# Patient Record
Sex: MALE | Race: WHITE | Employment: OTHER | URBAN - METROPOLITAN AREA
[De-identification: names, ages, dates, MRNs, and addresses within clinical notes are randomized per-mention and may not be internally consistent; named-entity substitution may affect disease eponyms.]

---

## 2017-01-04 ENCOUNTER — OFFICE VISIT (OUTPATIENT)
Dept: INTERNAL MEDICINE CLINIC | Age: 67
End: 2017-01-04

## 2017-01-04 ENCOUNTER — HOSPITAL ENCOUNTER (OUTPATIENT)
Dept: GENERAL RADIOLOGY | Age: 67
Discharge: HOME | End: 2017-01-04
Attending: FAMILY MEDICINE | Admitting: FAMILY MEDICINE

## 2017-01-04 VITALS
HEIGHT: 69 IN | BODY MASS INDEX: 27.43 KG/M2 | DIASTOLIC BLOOD PRESSURE: 74 MMHG | SYSTOLIC BLOOD PRESSURE: 112 MMHG | HEART RATE: 78 BPM | WEIGHT: 185.2 LBS

## 2017-01-04 DIAGNOSIS — E78.5 HYPERLIPIDEMIA LDL GOAL <70: ICD-10-CM

## 2017-01-04 DIAGNOSIS — R53.83 OTHER FATIGUE: ICD-10-CM

## 2017-01-04 DIAGNOSIS — I48.0 PAROXYSMAL ATRIAL FIBRILLATION (HCC): ICD-10-CM

## 2017-01-04 DIAGNOSIS — M54.50 CHRONIC BILATERAL LOW BACK PAIN WITHOUT SCIATICA: ICD-10-CM

## 2017-01-04 DIAGNOSIS — J30.1 ALLERGIC RHINITIS DUE TO POLLEN, UNSPECIFIED RHINITIS SEASONALITY: ICD-10-CM

## 2017-01-04 DIAGNOSIS — M54.50 CHRONIC MIDLINE LOW BACK PAIN WITHOUT SCIATICA: ICD-10-CM

## 2017-01-04 DIAGNOSIS — E11.65 TYPE 2 DIABETES MELLITUS WITH HYPERGLYCEMIA, WITHOUT LONG-TERM CURRENT USE OF INSULIN (HCC): ICD-10-CM

## 2017-01-04 DIAGNOSIS — G89.29 CHRONIC BILATERAL LOW BACK PAIN WITHOUT SCIATICA: ICD-10-CM

## 2017-01-04 DIAGNOSIS — E55.9 VITAMIN D DEFICIENCY: ICD-10-CM

## 2017-01-04 DIAGNOSIS — I10 ESSENTIAL HYPERTENSION: Primary | ICD-10-CM

## 2017-01-04 DIAGNOSIS — E53.8 VITAMIN B12 DEFICIENCY: ICD-10-CM

## 2017-01-04 DIAGNOSIS — G89.29 CHRONIC MIDLINE LOW BACK PAIN WITHOUT SCIATICA: ICD-10-CM

## 2017-01-04 DIAGNOSIS — N52.9 ERECTILE DYSFUNCTION, UNSPECIFIED ERECTILE DYSFUNCTION TYPE: ICD-10-CM

## 2017-01-04 DIAGNOSIS — Z79.01 LONG TERM CURRENT USE OF ANTICOAGULANT: ICD-10-CM

## 2017-01-04 LAB
A/G RATIO: 1.5 (ref 1.1–2.2)
ALBUMIN SERPL-MCNC: 4.5 G/DL (ref 3.4–5)
ALP BLD-CCNC: 35 U/L (ref 40–129)
ALT SERPL-CCNC: 34 U/L (ref 10–40)
ANION GAP SERPL CALCULATED.3IONS-SCNC: 16 MMOL/L (ref 3–16)
AST SERPL-CCNC: 30 U/L (ref 15–37)
BASOPHILS ABSOLUTE: 0 K/UL (ref 0–0.2)
BASOPHILS RELATIVE PERCENT: 0.6 %
BILIRUB SERPL-MCNC: 0.7 MG/DL (ref 0–1)
BUN BLDV-MCNC: 41 MG/DL (ref 7–20)
CALCIUM SERPL-MCNC: 10.9 MG/DL (ref 8.3–10.6)
CHLORIDE BLD-SCNC: 98 MMOL/L (ref 99–110)
CHOLESTEROL, TOTAL: 179 MG/DL (ref 0–199)
CO2: 24 MMOL/L (ref 21–32)
CREAT SERPL-MCNC: 1.1 MG/DL (ref 0.8–1.3)
EOSINOPHILS ABSOLUTE: 0.3 K/UL (ref 0–0.6)
EOSINOPHILS RELATIVE PERCENT: 3.7 %
GFR AFRICAN AMERICAN: >60
GFR NON-AFRICAN AMERICAN: >60
GLOBULIN: 3.1 G/DL
GLUCOSE BLD-MCNC: 103 MG/DL (ref 70–99)
HCT VFR BLD CALC: 44.6 % (ref 40.5–52.5)
HDLC SERPL-MCNC: 36 MG/DL (ref 40–60)
HEMOGLOBIN: 14.8 G/DL (ref 13.5–17.5)
LDL CHOLESTEROL CALCULATED: 87 MG/DL
LYMPHOCYTES ABSOLUTE: 1.8 K/UL (ref 1–5.1)
LYMPHOCYTES RELATIVE PERCENT: 25.1 %
MCH RBC QN AUTO: 30.1 PG (ref 26–34)
MCHC RBC AUTO-ENTMCNC: 33.1 G/DL (ref 31–36)
MCV RBC AUTO: 91.1 FL (ref 80–100)
MONOCYTES ABSOLUTE: 0.6 K/UL (ref 0–1.3)
MONOCYTES RELATIVE PERCENT: 7.8 %
NEUTROPHILS ABSOLUTE: 4.6 K/UL (ref 1.7–7.7)
NEUTROPHILS RELATIVE PERCENT: 62.8 %
PDW BLD-RTO: 13.1 % (ref 12.4–15.4)
PLATELET # BLD: 219 K/UL (ref 135–450)
PMV BLD AUTO: 9.7 FL (ref 5–10.5)
POTASSIUM SERPL-SCNC: 4.3 MMOL/L (ref 3.5–5.1)
RBC # BLD: 4.9 M/UL (ref 4.2–5.9)
SODIUM BLD-SCNC: 138 MMOL/L (ref 136–145)
TOTAL PROTEIN: 7.6 G/DL (ref 6.4–8.2)
TRIGL SERPL-MCNC: 281 MG/DL (ref 0–150)
TSH SERPL DL<=0.05 MIU/L-ACNC: 0.95 UIU/ML (ref 0.27–4.2)
VITAMIN B-12: >2000 PG/ML (ref 211–911)
VITAMIN D 25-HYDROXY: 50.4 NG/ML
VLDLC SERPL CALC-MCNC: 56 MG/DL
WBC # BLD: 7.3 K/UL (ref 4–11)

## 2017-01-04 PROCEDURE — 96372 THER/PROPH/DIAG INJ SC/IM: CPT | Performed by: FAMILY MEDICINE

## 2017-01-04 PROCEDURE — 99214 OFFICE O/P EST MOD 30 MIN: CPT | Performed by: FAMILY MEDICINE

## 2017-01-04 RX ORDER — HYDROCODONE BITARTRATE AND ACETAMINOPHEN 7.5; 325 MG/1; MG/1
TABLET ORAL
Qty: 150 TABLET | Refills: 0 | Status: SHIPPED | OUTPATIENT
Start: 2017-01-04 | End: 2017-02-06 | Stop reason: SDUPTHER

## 2017-01-04 RX ORDER — CYANOCOBALAMIN 1000 UG/ML
1000 INJECTION INTRAMUSCULAR; SUBCUTANEOUS ONCE
Status: COMPLETED | OUTPATIENT
Start: 2017-01-04 | End: 2017-01-04

## 2017-01-04 RX ADMIN — CYANOCOBALAMIN 1000 MCG: 1000 INJECTION INTRAMUSCULAR; SUBCUTANEOUS at 10:21

## 2017-01-04 ASSESSMENT — ENCOUNTER SYMPTOMS
DIARRHEA: 0
COUGH: 0
EYE ITCHING: 0
ABDOMINAL DISTENTION: 0
VOMITING: 0
EYE REDNESS: 0
SHORTNESS OF BREATH: 0
RHINORRHEA: 0
CONSTIPATION: 0
WHEEZING: 0
SORE THROAT: 0
ABDOMINAL PAIN: 0
STRIDOR: 0
BACK PAIN: 0
EYE PAIN: 0
COLOR CHANGE: 0
NAUSEA: 0
CHEST TIGHTNESS: 0

## 2017-01-05 LAB
ESTIMATED AVERAGE GLUCOSE: 116.9 MG/DL
HBA1C MFR BLD: 5.7 %

## 2017-01-09 RX ORDER — FENOFIBRATE 160 MG/1
TABLET ORAL
Qty: 90 TABLET | Refills: 3 | Status: SHIPPED | OUTPATIENT
Start: 2017-01-09

## 2017-01-12 ENCOUNTER — ANTI-COAG VISIT (OUTPATIENT)
Dept: PHARMACY | Facility: CLINIC | Age: 67
End: 2017-01-12

## 2017-01-12 LAB — INR BLD: 2.6

## 2017-02-06 ENCOUNTER — OFFICE VISIT (OUTPATIENT)
Dept: INTERNAL MEDICINE CLINIC | Age: 67
End: 2017-02-06

## 2017-02-06 VITALS
RESPIRATION RATE: 20 BRPM | SYSTOLIC BLOOD PRESSURE: 118 MMHG | HEIGHT: 69 IN | HEART RATE: 76 BPM | BODY MASS INDEX: 27.4 KG/M2 | DIASTOLIC BLOOD PRESSURE: 74 MMHG | WEIGHT: 185 LBS

## 2017-02-06 DIAGNOSIS — Z79.01 LONG TERM CURRENT USE OF ANTICOAGULANT: ICD-10-CM

## 2017-02-06 DIAGNOSIS — N20.0 KIDNEY STONE ON LEFT SIDE: ICD-10-CM

## 2017-02-06 DIAGNOSIS — G89.29 CHRONIC BILATERAL LOW BACK PAIN WITHOUT SCIATICA: ICD-10-CM

## 2017-02-06 DIAGNOSIS — G89.29 CHRONIC MIDLINE LOW BACK PAIN WITHOUT SCIATICA: ICD-10-CM

## 2017-02-06 DIAGNOSIS — R53.83 OTHER FATIGUE: ICD-10-CM

## 2017-02-06 DIAGNOSIS — I10 ESSENTIAL HYPERTENSION: ICD-10-CM

## 2017-02-06 DIAGNOSIS — E78.5 HYPERLIPIDEMIA LDL GOAL <70: ICD-10-CM

## 2017-02-06 DIAGNOSIS — E11.65 TYPE 2 DIABETES MELLITUS WITH HYPERGLYCEMIA, WITHOUT LONG-TERM CURRENT USE OF INSULIN (HCC): Primary | ICD-10-CM

## 2017-02-06 DIAGNOSIS — E55.9 VITAMIN D DEFICIENCY: ICD-10-CM

## 2017-02-06 DIAGNOSIS — E53.8 VITAMIN B12 DEFICIENCY: ICD-10-CM

## 2017-02-06 DIAGNOSIS — M54.50 CHRONIC MIDLINE LOW BACK PAIN WITHOUT SCIATICA: ICD-10-CM

## 2017-02-06 DIAGNOSIS — M54.50 CHRONIC BILATERAL LOW BACK PAIN WITHOUT SCIATICA: ICD-10-CM

## 2017-02-06 DIAGNOSIS — J30.1 ALLERGIC RHINITIS DUE TO POLLEN, UNSPECIFIED RHINITIS SEASONALITY: ICD-10-CM

## 2017-02-06 PROCEDURE — 99214 OFFICE O/P EST MOD 30 MIN: CPT | Performed by: FAMILY MEDICINE

## 2017-02-06 PROCEDURE — 96372 THER/PROPH/DIAG INJ SC/IM: CPT | Performed by: FAMILY MEDICINE

## 2017-02-06 RX ORDER — HYDROCODONE BITARTRATE AND ACETAMINOPHEN 7.5; 325 MG/1; MG/1
TABLET ORAL
Qty: 150 TABLET | Refills: 0 | Status: SHIPPED | OUTPATIENT
Start: 2017-02-06 | End: 2017-03-03 | Stop reason: SDUPTHER

## 2017-02-06 RX ORDER — CYANOCOBALAMIN 1000 UG/ML
1000 INJECTION INTRAMUSCULAR; SUBCUTANEOUS ONCE
Status: COMPLETED | OUTPATIENT
Start: 2017-02-06 | End: 2017-02-06

## 2017-02-06 RX ADMIN — CYANOCOBALAMIN 1000 MCG: 1000 INJECTION INTRAMUSCULAR; SUBCUTANEOUS at 11:25

## 2017-02-06 ASSESSMENT — ENCOUNTER SYMPTOMS
DIARRHEA: 0
CONSTIPATION: 0
ABDOMINAL DISTENTION: 0
SHORTNESS OF BREATH: 0
STRIDOR: 0
ABDOMINAL PAIN: 0
WHEEZING: 0
EYE REDNESS: 0
VOMITING: 0
SORE THROAT: 0
CHEST TIGHTNESS: 0
EYE ITCHING: 0
NAUSEA: 0
COLOR CHANGE: 0
EYE PAIN: 0
RHINORRHEA: 0
COUGH: 0
BACK PAIN: 0

## 2017-02-09 ENCOUNTER — ANTI-COAG VISIT (OUTPATIENT)
Dept: PHARMACY | Facility: CLINIC | Age: 67
End: 2017-02-09

## 2017-02-09 DIAGNOSIS — Z79.01 LONG TERM CURRENT USE OF ANTICOAGULANT: ICD-10-CM

## 2017-02-09 LAB — INR BLD: 1.6

## 2017-02-23 ENCOUNTER — ANTI-COAG VISIT (OUTPATIENT)
Dept: PHARMACY | Facility: CLINIC | Age: 67
End: 2017-02-23

## 2017-02-23 DIAGNOSIS — Z79.01 LONG TERM CURRENT USE OF ANTICOAGULANT: ICD-10-CM

## 2017-02-23 LAB — INR BLD: 2

## 2017-03-03 ENCOUNTER — OFFICE VISIT (OUTPATIENT)
Dept: INTERNAL MEDICINE CLINIC | Age: 67
End: 2017-03-03

## 2017-03-03 VITALS
DIASTOLIC BLOOD PRESSURE: 70 MMHG | HEIGHT: 69 IN | SYSTOLIC BLOOD PRESSURE: 110 MMHG | TEMPERATURE: 98 F | HEART RATE: 74 BPM | BODY MASS INDEX: 27.11 KG/M2 | OXYGEN SATURATION: 98 % | WEIGHT: 183 LBS

## 2017-03-03 DIAGNOSIS — J30.1 ALLERGIC RHINITIS DUE TO POLLEN, UNSPECIFIED RHINITIS SEASONALITY: ICD-10-CM

## 2017-03-03 DIAGNOSIS — L57.0 ACTINIC KERATOSIS: ICD-10-CM

## 2017-03-03 DIAGNOSIS — E78.5 HYPERLIPIDEMIA LDL GOAL <70: ICD-10-CM

## 2017-03-03 DIAGNOSIS — G89.29 CHRONIC MIDLINE LOW BACK PAIN WITHOUT SCIATICA: ICD-10-CM

## 2017-03-03 DIAGNOSIS — R53.83 OTHER FATIGUE: ICD-10-CM

## 2017-03-03 DIAGNOSIS — I10 ESSENTIAL HYPERTENSION: Primary | ICD-10-CM

## 2017-03-03 DIAGNOSIS — E53.8 VITAMIN B12 DEFICIENCY: ICD-10-CM

## 2017-03-03 DIAGNOSIS — Z79.01 LONG TERM CURRENT USE OF ANTICOAGULANT: ICD-10-CM

## 2017-03-03 DIAGNOSIS — E55.9 VITAMIN D DEFICIENCY: ICD-10-CM

## 2017-03-03 DIAGNOSIS — M54.50 CHRONIC MIDLINE LOW BACK PAIN WITHOUT SCIATICA: ICD-10-CM

## 2017-03-03 DIAGNOSIS — E11.65 TYPE 2 DIABETES MELLITUS WITH HYPERGLYCEMIA, WITHOUT LONG-TERM CURRENT USE OF INSULIN (HCC): ICD-10-CM

## 2017-03-03 DIAGNOSIS — G89.29 CHRONIC BILATERAL LOW BACK PAIN WITHOUT SCIATICA: ICD-10-CM

## 2017-03-03 DIAGNOSIS — I48.0 PAROXYSMAL ATRIAL FIBRILLATION (HCC): ICD-10-CM

## 2017-03-03 DIAGNOSIS — M54.50 CHRONIC BILATERAL LOW BACK PAIN WITHOUT SCIATICA: ICD-10-CM

## 2017-03-03 PROCEDURE — 96372 THER/PROPH/DIAG INJ SC/IM: CPT | Performed by: FAMILY MEDICINE

## 2017-03-03 PROCEDURE — 99214 OFFICE O/P EST MOD 30 MIN: CPT | Performed by: FAMILY MEDICINE

## 2017-03-03 RX ORDER — CYANOCOBALAMIN 1000 UG/ML
1000 INJECTION INTRAMUSCULAR; SUBCUTANEOUS ONCE
Status: COMPLETED | OUTPATIENT
Start: 2017-03-03 | End: 2017-03-03

## 2017-03-03 RX ORDER — HYDROCODONE BITARTRATE AND ACETAMINOPHEN 7.5; 325 MG/1; MG/1
TABLET ORAL
Qty: 150 TABLET | Refills: 0 | Status: SHIPPED | OUTPATIENT
Start: 2017-03-03

## 2017-03-03 RX ADMIN — CYANOCOBALAMIN 1000 MCG: 1000 INJECTION INTRAMUSCULAR; SUBCUTANEOUS at 14:38

## 2017-03-03 ASSESSMENT — ENCOUNTER SYMPTOMS
CHEST TIGHTNESS: 0
SHORTNESS OF BREATH: 0
DIARRHEA: 0
COLOR CHANGE: 0
ABDOMINAL PAIN: 0
ABDOMINAL DISTENTION: 0
NAUSEA: 0
RHINORRHEA: 0
EYE PAIN: 0
EYE ITCHING: 0
SORE THROAT: 0
COUGH: 0
BACK PAIN: 0
VOMITING: 0
CONSTIPATION: 0
EYE REDNESS: 0
STRIDOR: 0
WHEEZING: 0

## 2017-03-23 ENCOUNTER — ANTI-COAG VISIT (OUTPATIENT)
Dept: PHARMACY | Facility: CLINIC | Age: 67
End: 2017-03-23

## 2017-03-23 DIAGNOSIS — Z79.01 LONG TERM CURRENT USE OF ANTICOAGULANT: ICD-10-CM

## 2017-03-23 LAB — INR BLD: 2

## 2017-04-20 ENCOUNTER — ANTI-COAG VISIT (OUTPATIENT)
Dept: PHARMACY | Facility: CLINIC | Age: 67
End: 2017-04-20

## 2017-04-20 DIAGNOSIS — Z79.01 LONG TERM CURRENT USE OF ANTICOAGULANT: ICD-10-CM

## 2017-04-20 LAB — INR BLD: 1.9

## 2017-05-18 ENCOUNTER — ANTI-COAG VISIT (OUTPATIENT)
Dept: PHARMACY | Facility: CLINIC | Age: 67
End: 2017-05-18

## 2017-05-18 DIAGNOSIS — Z79.01 LONG TERM CURRENT USE OF ANTICOAGULANT: ICD-10-CM

## 2017-05-18 LAB — INR BLD: 3

## 2017-06-15 ENCOUNTER — ANTI-COAG VISIT (OUTPATIENT)
Dept: PHARMACY | Facility: CLINIC | Age: 67
End: 2017-06-15

## 2017-06-15 DIAGNOSIS — Z79.01 LONG TERM CURRENT USE OF ANTICOAGULANT: ICD-10-CM

## 2017-06-15 LAB — INR BLD: 3.4

## 2017-07-13 ENCOUNTER — ANTI-COAG VISIT (OUTPATIENT)
Dept: PHARMACY | Facility: CLINIC | Age: 67
End: 2017-07-13

## 2017-07-13 DIAGNOSIS — Z79.01 LONG TERM CURRENT USE OF ANTICOAGULANT: ICD-10-CM

## 2017-07-13 LAB — INR BLD: 3.2

## 2017-08-10 ENCOUNTER — ANTI-COAG VISIT (OUTPATIENT)
Dept: PHARMACY | Facility: CLINIC | Age: 67
End: 2017-08-10

## 2017-08-10 DIAGNOSIS — Z79.01 LONG TERM CURRENT USE OF ANTICOAGULANT: ICD-10-CM

## 2017-08-10 LAB — INR BLD: 1.9

## 2017-09-08 ENCOUNTER — ANTI-COAG VISIT (OUTPATIENT)
Dept: PHARMACY | Facility: CLINIC | Age: 67
End: 2017-09-08

## 2017-09-08 DIAGNOSIS — Z79.01 LONG TERM CURRENT USE OF ANTICOAGULANT: ICD-10-CM

## 2017-09-08 LAB — INR BLD: 2.1

## 2017-10-09 ENCOUNTER — ANTI-COAG VISIT (OUTPATIENT)
Dept: PHARMACY | Facility: CLINIC | Age: 67
End: 2017-10-09

## 2017-10-09 DIAGNOSIS — Z79.01 LONG TERM CURRENT USE OF ANTICOAGULANT: ICD-10-CM

## 2017-10-09 LAB — INR BLD: 2.1

## 2017-10-09 NOTE — PROGRESS NOTES
Mr. Carlotta Corral is here for management of anticoagulation for AFib. PMH also significant for HTN, HLD, and DM. He presents today w/out complaint. Pt verified dosing regimen. Pt denies s/s bleeding/bruising/swelling/SOB. No BRBPR. No melena. No missed doses reported. No changes in Rx/OTCs/Herbal medications. No dietary changes. Patient states he is still eating ~3 greens/week  Denies EtOH and tobacco use. INR 2.1 is within acceptable therapeutic range of 2-3. Recommended to continue normal home dose of 5 mg daily. Pt has 5 mg tabs. Will continue to monitor and check INR in 4 weeks. Dosing reminder card given with phone number, appointment date and time.    Return to clinic: 11/6/17 at 7:00 am

## 2017-11-01 ENCOUNTER — HOSPITAL ENCOUNTER (OUTPATIENT)
Dept: OTHER | Age: 67
Discharge: HOME | End: 2017-11-30
Attending: INTERNAL MEDICINE | Admitting: INTERNAL MEDICINE

## 2017-11-06 ENCOUNTER — ANTI-COAG VISIT (OUTPATIENT)
Dept: PHARMACY | Facility: CLINIC | Age: 67
End: 2017-11-06

## 2017-11-06 DIAGNOSIS — Z79.01 LONG TERM CURRENT USE OF ANTICOAGULANT: ICD-10-CM

## 2017-11-06 LAB — INR BLD: 1.7

## 2017-11-06 NOTE — PROGRESS NOTES
Mr. Janelle David is here for management of anticoagulation for AFib. PMH also significant for HTN, HLD, and DM. He presents today w/out complaint. Pt verified dosing regimen. Pt denies s/s bleeding/bruising/swelling/SOB. No BRBPR. No melena. No missed doses reported. No changes in Rx/OTCs/Herbal medications. Patient states he is still eating ~3 greens/week- but this past week he ate large salads the past three days in a row, not typical for him,   Denies EtOH and tobacco use. INR 1.7 is below acceptable therapeutic range of 2-3. Due to extra vit k the past few days. Recommended to take 7.5mg tonight only, then continue normal home dose of 5 mg daily. Pt has 5 mg tabs. Will continue to monitor and check INR in 4 weeks. Dosing reminder card given with phone number, appointment date and time.    Return to clinic: 12/4/17 at 7:00 am

## 2017-11-17 ENCOUNTER — OFFICE VISIT (OUTPATIENT)
Dept: CARDIOLOGY CLINIC | Age: 67
End: 2017-11-17

## 2017-11-17 VITALS
WEIGHT: 180 LBS | HEIGHT: 69 IN | SYSTOLIC BLOOD PRESSURE: 118 MMHG | HEART RATE: 58 BPM | DIASTOLIC BLOOD PRESSURE: 76 MMHG | BODY MASS INDEX: 26.66 KG/M2

## 2017-11-17 DIAGNOSIS — I48.0 PAROXYSMAL ATRIAL FIBRILLATION (HCC): Primary | ICD-10-CM

## 2017-11-17 PROCEDURE — 4040F PNEUMOC VAC/ADMIN/RCVD: CPT | Performed by: INTERNAL MEDICINE

## 2017-11-17 PROCEDURE — 99213 OFFICE O/P EST LOW 20 MIN: CPT | Performed by: INTERNAL MEDICINE

## 2017-11-17 PROCEDURE — G8427 DOCREV CUR MEDS BY ELIG CLIN: HCPCS | Performed by: INTERNAL MEDICINE

## 2017-11-17 PROCEDURE — 93000 ELECTROCARDIOGRAM COMPLETE: CPT | Performed by: INTERNAL MEDICINE

## 2017-11-17 PROCEDURE — 3017F COLORECTAL CA SCREEN DOC REV: CPT | Performed by: INTERNAL MEDICINE

## 2017-11-17 PROCEDURE — G8484 FLU IMMUNIZE NO ADMIN: HCPCS | Performed by: INTERNAL MEDICINE

## 2017-11-17 PROCEDURE — G8419 CALC BMI OUT NRM PARAM NOF/U: HCPCS | Performed by: INTERNAL MEDICINE

## 2017-11-17 PROCEDURE — 1123F ACP DISCUSS/DSCN MKR DOCD: CPT | Performed by: INTERNAL MEDICINE

## 2017-11-17 PROCEDURE — 1036F TOBACCO NON-USER: CPT | Performed by: INTERNAL MEDICINE

## 2017-11-17 NOTE — PROGRESS NOTES
sputum production, or hemoptysis. · Gastrointestinal: No abdominal pain, appetite loss, blood in stools, or change in bowel or bladder habits. · Genitourinary: No dysuria, trouble voiding, or hematuria. · Musculoskeletal:  No gait disturbance, weakness, or joint complaints. · Integumentary: No rash or pruritis. · Neurological: No headache, diplopia, change in muscle strength, numbness or tingling. No change in gait, balance, coordination, mood, affect, memory, mentation, behavior. · Psychiatric: No anxiety or depression. · Endocrine: No temperature intolerance, excessive thirst, fluid intake, or urination. No tremor. · Hematologic/Lymphatic: No abnormal bruising or bleeding, blood clots or swollen lymph nodes. · Allergic/Immunologic: No nasal congestion or hives. PHYSICAL EXAM:  Unchanged since last visit   Physical Examination:    /76   Pulse 58   Ht 5' 9\" (1.753 m)   Wt 180 lb (81.6 kg)   BMI 26.58 kg/m²      Constitutional and General Appearance: alert, cooperative, no distress and appears stated age  [de-identified]: PERRL, no cervical lymphadenopathy. No masses palpable. Normal oral mucosa  Respiratory:  · Normal excursion and expansion without use of accessory muscles  · Resp Auscultation: Normal breath sounds without dullness or wheezing  Cardiovascular:  · The apical impulse is not displaced  · Heart tones are crisp and normal. regular S1 and S2.  · Jugular venous pulsation Normal  · The carotid upstroke is normal in amplitude and contour without delay or bruit  · Peripheral pulses are symmetrical and full   Abdomen:  · No masses or tenderness  · Bowel sounds present  Extremities:  ·  No cyanosis or clubbing  ·  Lower extremity edema: No  ·  Skin: Warm and dry  Neurological:  · Alert and oriented. · Moves all extremities well  · No abnormalities of mood, affect, memory, mentation, or behavior are noted    DATA:    ECG 11/2/2015:  AB 58    Echo 11/12/2012:  1. Technically difficult study.  Left

## 2017-12-01 ENCOUNTER — HOSPITAL ENCOUNTER (OUTPATIENT)
Dept: OTHER | Age: 67
Discharge: HOME | End: 2017-12-31
Attending: INTERNAL MEDICINE | Admitting: INTERNAL MEDICINE

## 2017-12-04 ENCOUNTER — ANTI-COAG VISIT (OUTPATIENT)
Dept: PHARMACY | Facility: CLINIC | Age: 67
End: 2017-12-04

## 2017-12-04 DIAGNOSIS — Z79.01 LONG TERM CURRENT USE OF ANTICOAGULANT: ICD-10-CM

## 2017-12-04 LAB — INR BLD: 2.6

## 2017-12-04 NOTE — PROGRESS NOTES
Mr. Bam Davidson is here for management of anticoagulation for AFib. PMH also significant for HTN, HLD, and DM. He presents today w/out complaint. Pt verified dosing regimen. Pt denies s/s bleeding/bruising/swelling/SOB. No BRBPR. No melena. No missed doses reported. No changes in Rx/OTCs/Herbal medications. Patient states he is still eating ~3 greens/week. Denies EtOH and tobacco use. INR 2.6 is within acceptable therapeutic range of 2-3. Recommended to  continue normal home dose of 5 mg daily. Pt has 5 mg tabs. Will continue to monitor and check INR in 4 weeks. Dosing reminder card given with phone number, appointment date and time. Return to clinic: 1/8/2018 at 7:00 am    I have seen the patient and reviewed the progress note written by the PharmD Candidate. I agree with this assessment and plan.    Charisma Mckinney, PharmD 12/4/2017 8:12 AM

## 2018-01-01 ENCOUNTER — HOSPITAL ENCOUNTER (OUTPATIENT)
Dept: OTHER | Age: 68
Discharge: HOME | End: 2018-01-31
Attending: INTERNAL MEDICINE | Admitting: INTERNAL MEDICINE

## 2018-01-08 LAB
AVERAGE GLUCOSE: NORMAL
HBA1C MFR BLD: 5.4 %

## 2018-01-09 ENCOUNTER — ANTI-COAG VISIT (OUTPATIENT)
Dept: PHARMACY | Facility: CLINIC | Age: 68
End: 2018-01-09

## 2018-01-09 DIAGNOSIS — Z79.01 LONG TERM CURRENT USE OF ANTICOAGULANT: ICD-10-CM

## 2018-01-09 LAB — INR BLD: 2.1

## 2018-02-01 ENCOUNTER — HOSPITAL ENCOUNTER (OUTPATIENT)
Dept: OTHER | Age: 68
Discharge: HOME | End: 2018-02-28
Attending: INTERNAL MEDICINE | Admitting: INTERNAL MEDICINE

## 2018-02-06 ENCOUNTER — ANTI-COAG VISIT (OUTPATIENT)
Dept: PHARMACY | Facility: CLINIC | Age: 68
End: 2018-02-06

## 2018-02-06 DIAGNOSIS — Z79.01 LONG TERM CURRENT USE OF ANTICOAGULANT: ICD-10-CM

## 2018-02-06 LAB — INR BLD: 2.4

## 2018-03-01 ENCOUNTER — HOSPITAL ENCOUNTER (OUTPATIENT)
Dept: OTHER | Age: 68
Discharge: HOME | End: 2018-03-31
Attending: INTERNAL MEDICINE | Admitting: INTERNAL MEDICINE

## 2018-03-05 ENCOUNTER — ANTI-COAG VISIT (OUTPATIENT)
Dept: PHARMACY | Facility: CLINIC | Age: 68
End: 2018-03-05

## 2018-03-05 DIAGNOSIS — Z79.01 LONG TERM CURRENT USE OF ANTICOAGULANT: ICD-10-CM

## 2018-03-05 LAB — INR BLD: 5.1

## 2018-03-19 ENCOUNTER — ANTI-COAG VISIT (OUTPATIENT)
Dept: PHARMACY | Facility: CLINIC | Age: 68
End: 2018-03-19

## 2018-03-19 DIAGNOSIS — Z79.01 LONG TERM CURRENT USE OF ANTICOAGULANT: ICD-10-CM

## 2018-03-19 LAB — INR BLD: 2.6

## 2018-03-19 NOTE — PROGRESS NOTES
Mr. Moya Pa is here for management of anticoagulation for AFib. PMH also significant for HTN, HLD, and DM. He presents today w/out complaint. Pt verified dosing regimen. Pt denies s/s bleeding/bruising/swelling/SOB. No BRBPR. No melena. No missed doses reported. No changes in Rx/Herbal medications. Pt reports feeling much better and stopping all his OTC products and that is diet is much better. Patient states he is still eating ~3 greens/week. Denies EtOH and tobacco use. INR 2.6 is within acceptable therapeutic range of 2-3. Recommended to continue 5 mg daily. Pt has 5 mg tabs. Will continue to monitor and check INR in 4 weeks. Dosing reminder card given with phone number, appointment date and time.    Return to clinic: 4/16 at 0700

## 2018-04-01 ENCOUNTER — HOSPITAL ENCOUNTER (OUTPATIENT)
Dept: OTHER | Age: 68
Discharge: HOME | End: 2018-04-30
Attending: INTERNAL MEDICINE | Admitting: INTERNAL MEDICINE

## 2018-04-16 ENCOUNTER — ANTI-COAG VISIT (OUTPATIENT)
Dept: PHARMACY | Facility: CLINIC | Age: 68
End: 2018-04-16

## 2018-04-16 DIAGNOSIS — Z79.01 LONG TERM CURRENT USE OF ANTICOAGULANT: ICD-10-CM

## 2018-04-16 LAB — INR BLD: 2.6

## 2018-05-01 ENCOUNTER — HOSPITAL ENCOUNTER (OUTPATIENT)
Dept: OTHER | Age: 68
Discharge: HOME | End: 2018-05-31
Attending: INTERNAL MEDICINE | Admitting: INTERNAL MEDICINE

## 2018-05-18 ENCOUNTER — ANTI-COAG VISIT (OUTPATIENT)
Dept: PHARMACY | Facility: CLINIC | Age: 68
End: 2018-05-18

## 2018-05-18 DIAGNOSIS — Z79.01 LONG TERM CURRENT USE OF ANTICOAGULANT: ICD-10-CM

## 2018-05-18 LAB — INR BLD: 3.2

## 2018-06-01 ENCOUNTER — HOSPITAL ENCOUNTER (OUTPATIENT)
Dept: OTHER | Age: 68
Discharge: HOME | End: 2018-06-30
Attending: INTERNAL MEDICINE | Admitting: INTERNAL MEDICINE

## 2018-06-15 ENCOUNTER — ANTI-COAG VISIT (OUTPATIENT)
Dept: PHARMACY | Facility: CLINIC | Age: 68
End: 2018-06-15

## 2018-06-15 DIAGNOSIS — Z79.01 LONG TERM CURRENT USE OF ANTICOAGULANT: ICD-10-CM

## 2018-06-15 LAB — INR BLD: 3.5

## 2018-07-01 ENCOUNTER — HOSPITAL ENCOUNTER (OUTPATIENT)
Dept: OTHER | Age: 68
Discharge: HOME | End: 2018-07-02
Attending: INTERNAL MEDICINE | Admitting: INTERNAL MEDICINE

## 2018-07-06 ENCOUNTER — ANTI-COAG VISIT (OUTPATIENT)
Dept: PHARMACY | Facility: CLINIC | Age: 68
End: 2018-07-06

## 2018-07-06 DIAGNOSIS — Z79.01 LONG TERM CURRENT USE OF ANTICOAGULANT: ICD-10-CM

## 2018-07-06 LAB — INR BLD: 2.8

## 2018-08-03 ENCOUNTER — ANTI-COAG VISIT (OUTPATIENT)
Dept: PHARMACY | Age: 68
End: 2018-08-03
Payer: MEDICARE

## 2018-08-03 DIAGNOSIS — Z79.01 LONG TERM CURRENT USE OF ANTICOAGULANT: ICD-10-CM

## 2018-08-03 LAB — INR BLD: 3.2

## 2018-08-03 PROCEDURE — 85610 PROTHROMBIN TIME: CPT | Performed by: PHARMACIST

## 2018-08-03 PROCEDURE — 99211 OFF/OP EST MAY X REQ PHY/QHP: CPT | Performed by: PHARMACIST

## 2018-08-31 ENCOUNTER — ANTI-COAG VISIT (OUTPATIENT)
Dept: PHARMACY | Age: 68
End: 2018-08-31
Payer: MEDICARE

## 2018-08-31 DIAGNOSIS — Z79.01 LONG TERM CURRENT USE OF ANTICOAGULANT: ICD-10-CM

## 2018-08-31 LAB — INR BLD: 3.6

## 2018-08-31 PROCEDURE — 85610 PROTHROMBIN TIME: CPT | Performed by: FAMILY MEDICINE

## 2018-08-31 PROCEDURE — 99211 OFF/OP EST MAY X REQ PHY/QHP: CPT | Performed by: FAMILY MEDICINE

## 2018-09-14 ENCOUNTER — ANTI-COAG VISIT (OUTPATIENT)
Dept: PHARMACY | Age: 68
End: 2018-09-14
Payer: MEDICARE

## 2018-09-14 DIAGNOSIS — Z79.01 LONG TERM CURRENT USE OF ANTICOAGULANT: ICD-10-CM

## 2018-09-14 LAB — INR BLD: 1.6

## 2018-09-14 PROCEDURE — 99211 OFF/OP EST MAY X REQ PHY/QHP: CPT

## 2018-09-14 PROCEDURE — 85610 PROTHROMBIN TIME: CPT

## 2018-10-04 ENCOUNTER — ANTI-COAG VISIT (OUTPATIENT)
Dept: PHARMACY | Age: 68
End: 2018-10-04
Payer: MEDICARE

## 2018-10-04 DIAGNOSIS — Z79.01 LONG TERM CURRENT USE OF ANTICOAGULANT: ICD-10-CM

## 2018-10-04 LAB — INR BLD: 4.8

## 2018-10-04 PROCEDURE — 85610 PROTHROMBIN TIME: CPT | Performed by: FAMILY MEDICINE

## 2018-10-04 PROCEDURE — 99211 OFF/OP EST MAY X REQ PHY/QHP: CPT | Performed by: FAMILY MEDICINE

## 2018-10-15 ENCOUNTER — ANTI-COAG VISIT (OUTPATIENT)
Dept: PHARMACY | Age: 68
End: 2018-10-15
Payer: MEDICARE

## 2018-10-15 DIAGNOSIS — Z79.01 LONG TERM CURRENT USE OF ANTICOAGULANT: ICD-10-CM

## 2018-10-15 LAB — INR BLD: 1.9

## 2018-10-15 PROCEDURE — 85610 PROTHROMBIN TIME: CPT | Performed by: INTERNAL MEDICINE

## 2018-10-15 PROCEDURE — 99211 OFF/OP EST MAY X REQ PHY/QHP: CPT | Performed by: INTERNAL MEDICINE

## 2018-10-15 NOTE — PROGRESS NOTES
Mr. Ok Valdez is here for management of anticoagulation for AFib. PMH also significant for HTN, HLD, and DM. He presents today w/out complaint. Pt verified dosing regimen. Pt denies s/s bleeding/bruising/swelling/SOB. No BRBPR. No melena. No missed doses. No changes in Rx/OTC/herbal medications. No major changes in diet. Patient states he is still eating ~3 greens/week. Denies EtOH and tobacco use. 10/15 - Patient had podiatry appt to have a toenail removed on the 9th, did not take warfarin Oct 5-9th. Patient also reports having a reasonably large serving of bruEnersavets last night. INR 1.9 is slightly below acceptable therapeutic range of 2-3. Recommended to continue 5mg daily, except 2.5mg every Mon and Thurs. Pt has 5 mg tabs. Will continue to monitor and check INR in 4 weeks. Dosing reminder card given with phone number, appointment date and time. Return to clinic: 11/13 @ 7:00 AM (patient likes 7am appts)  Referring physician: Dr. Kike Barraza  PharmD Student [id number]  10/15/2018 7:13 AM     I have seen the patient and reviewed the progress note written by the PharmD Candidate. I agree with this assessment and plan.    Rosemary Mendenhall, PharmD 10/15/2018 8:04 AM

## 2018-11-13 ENCOUNTER — ANTI-COAG VISIT (OUTPATIENT)
Dept: PHARMACY | Age: 68
End: 2018-11-13
Payer: MEDICARE

## 2018-11-13 DIAGNOSIS — Z79.01 LONG TERM CURRENT USE OF ANTICOAGULANT: ICD-10-CM

## 2018-11-13 LAB — INR BLD: 2.1

## 2018-11-13 PROCEDURE — 85610 PROTHROMBIN TIME: CPT

## 2018-11-13 PROCEDURE — 99211 OFF/OP EST MAY X REQ PHY/QHP: CPT

## 2018-11-13 NOTE — PROGRESS NOTES
Mr. Jayashree Wilhelm is here for management of anticoagulation for AFib. PMH also significant for HTN, HLD, and DM. He presents today w/out complaint. Pt verified dosing regimen. Pt denies s/s bleeding/bruising/swelling/SOB. No BRBPR. No melena. No missed doses. No changes in Rx/OTC/herbal medications. No major changes in diet. Patient states he is still eating ~3 greens/week. Denies EtOH and tobacco use. INR 2.1 is within acceptable therapeutic range of 2-3. Recommended to continue 5mg daily, except 2.5mg every Mon and Thurs. Pt has 5 mg tabs. Will continue to monitor and check INR in 4 weeks. Dosing reminder card given with phone number, appointment date and time.    Return to clinic: 12/17 @ 7:00 AM (patient likes 7am appts)  Referring physician: Dr. Elías East

## 2018-11-20 ENCOUNTER — OFFICE VISIT (OUTPATIENT)
Dept: CARDIOLOGY CLINIC | Age: 68
End: 2018-11-20
Payer: MEDICARE

## 2018-11-20 VITALS
OXYGEN SATURATION: 97 % | WEIGHT: 188 LBS | HEIGHT: 69 IN | BODY MASS INDEX: 27.85 KG/M2 | SYSTOLIC BLOOD PRESSURE: 124 MMHG | DIASTOLIC BLOOD PRESSURE: 80 MMHG | HEART RATE: 95 BPM

## 2018-11-20 DIAGNOSIS — I10 ESSENTIAL HYPERTENSION: ICD-10-CM

## 2018-11-20 DIAGNOSIS — I48.19 PERSISTENT ATRIAL FIBRILLATION (HCC): Primary | ICD-10-CM

## 2018-11-20 PROCEDURE — 1036F TOBACCO NON-USER: CPT | Performed by: NURSE PRACTITIONER

## 2018-11-20 PROCEDURE — 93000 ELECTROCARDIOGRAM COMPLETE: CPT | Performed by: NURSE PRACTITIONER

## 2018-11-20 PROCEDURE — 3017F COLORECTAL CA SCREEN DOC REV: CPT | Performed by: NURSE PRACTITIONER

## 2018-11-20 PROCEDURE — 1101F PT FALLS ASSESS-DOCD LE1/YR: CPT | Performed by: NURSE PRACTITIONER

## 2018-11-20 PROCEDURE — 4040F PNEUMOC VAC/ADMIN/RCVD: CPT | Performed by: NURSE PRACTITIONER

## 2018-11-20 PROCEDURE — G8484 FLU IMMUNIZE NO ADMIN: HCPCS | Performed by: NURSE PRACTITIONER

## 2018-11-20 PROCEDURE — G8419 CALC BMI OUT NRM PARAM NOF/U: HCPCS | Performed by: NURSE PRACTITIONER

## 2018-11-20 PROCEDURE — 1123F ACP DISCUSS/DSCN MKR DOCD: CPT | Performed by: NURSE PRACTITIONER

## 2018-11-20 PROCEDURE — 99214 OFFICE O/P EST MOD 30 MIN: CPT | Performed by: NURSE PRACTITIONER

## 2018-11-20 PROCEDURE — G8427 DOCREV CUR MEDS BY ELIG CLIN: HCPCS | Performed by: NURSE PRACTITIONER

## 2018-11-20 RX ORDER — FLECAINIDE ACETATE 100 MG/1
100 TABLET ORAL DAILY
Qty: 180 TABLET | Refills: 1
Start: 2018-11-20 | End: 2018-11-20 | Stop reason: SDUPTHER

## 2018-11-20 RX ORDER — FLECAINIDE ACETATE 50 MG/1
50 TABLET ORAL DAILY
COMMUNITY
End: 2018-11-20 | Stop reason: SDUPTHER

## 2018-11-20 RX ORDER — METOPROLOL SUCCINATE 100 MG/1
100 TABLET, EXTENDED RELEASE ORAL DAILY
Qty: 90 TABLET | Refills: 3 | Status: SHIPPED | OUTPATIENT
Start: 2018-11-20 | End: 2019-09-17 | Stop reason: SDUPTHER

## 2018-11-20 RX ORDER — METOPROLOL SUCCINATE 100 MG/1
100 TABLET, EXTENDED RELEASE ORAL DAILY
Qty: 90 TABLET | Refills: 3
Start: 2018-11-20 | End: 2018-11-20 | Stop reason: SDUPTHER

## 2018-11-20 RX ORDER — FLECAINIDE ACETATE 100 MG/1
100 TABLET ORAL DAILY
Qty: 180 TABLET | Refills: 1 | Status: SHIPPED | OUTPATIENT
Start: 2018-11-20 | End: 2018-11-20 | Stop reason: SDUPTHER

## 2018-11-20 RX ORDER — FLECAINIDE ACETATE 100 MG/1
100 TABLET ORAL DAILY
Qty: 180 TABLET | Refills: 1 | Status: SHIPPED | OUTPATIENT
Start: 2018-11-20 | End: 2019-11-25 | Stop reason: SDUPTHER

## 2018-11-20 RX ORDER — METOPROLOL SUCCINATE 100 MG/1
100 TABLET, EXTENDED RELEASE ORAL DAILY
Qty: 90 TABLET | Refills: 3 | Status: SHIPPED | OUTPATIENT
Start: 2018-11-20 | End: 2018-11-20 | Stop reason: SDUPTHER

## 2018-11-20 NOTE — COMMUNICATION BODY
PCP managing    Plan:  1. Continue Coumadin, losartan, and HCTZ ([facility] managing Coumadin)  2. Increase flecainide to 100mg po BID  3. Increase Toprol to 100mg po QD  4. Stop benazepril due to ARB usage   5. Monitor HR at home and call if consistently out of goal range  6.  Follow up in 4 weeks to discuss possible cardioversion/assess HR    Zack King, [address]  [phone]

## 2018-11-20 NOTE — PROGRESS NOTES
[address]   Electrophysiology Note              Date:  November 20, 2018  Patient name: Jessenia Mccarthy  YOB: 1950    Primary Care physician: Liu Regan MD    HISTORY OF PRESENT ILLNESS: The patient is a 77 y.o.  male with a history of paroxysmal atrial fibrillation, HTN, DM, ED, and HLD. He had no known recurrence of AF since starting flecainide in 2014. Today he is being seen for atrial fibrillation. EKG shows atrial fibrillation with a HR of 111. He is unaware of the arrhythmia. States he had a recent URI. Denies chest pain, palpitations, shortness of breath, and dizziness. Past Medical History:   has a past medical history of Allergic rhinitis; Atrial fibrillation ([address] Chronic LBP; Diabetes mellitus ([facility] [address] Erectile dysfunction; Hypercholesteremia; Hypertension; Kidney stone on left side; Type II or unspecified type diabetes mellitus without mention of complication, not stated as uncontrolled; and Wears glasses. Past Surgical History:   has a past surgical history that includes Appendectomy (Right, 01/01/1955); skin biopsy (Right, 01/01/2007); and back surgery (4/24/14). Home Medications:    Prior to Admission medications    Medication Sig Start Date End Date Taking? Authorizing Provider   flecainide (TAMBOCOR) 50 MG tablet Take 50 mg by mouth daily   Yes Historical Provider, MD   warfarin (COUMADIN) 5 MG tablet TAKE 1 TABLET ONE TIME DAILY  OR AS DIRECTED  BY  CLINIC 7/23/18  Yes KESHIA Fernandes - CHELE   HYDROcodone-acetaminophen (Rinda Broad) 7.5-325 MG per tablet TAKE ONE TABLET BY MOUTH EVERY 4-6 HOURS AS NEEDED FOR PAIN. MAXIMUM 5 per day. . 3/3/17  Yes Liu Regan MD   fenofibrate 160 MG tablet TAKE 1 TABLET ONE TIME DAILY 1/9/17  Yes Liu Regan MD   losartan-hydrochlorothiazide [facility]) 100-12.5 MG per tablet TAKE 1 TABLET EVERY DAY 12/8/16  Yes Liu Regan MD   metFORMIN (GLUCOPHAGE) 1000 MG tablet TAKE 1 TABLET TWICE DAILY WITH MEALS mucosa  Respiratory:  · Normal excursion and expansion without use of accessory muscles  · Resp Auscultation: Normal breath sounds without dullness or wheezing  Cardiovascular:  · The apical impulse is not displaced  · Heart tones are crisp and normal. Irregular S1 and S2.  · Jugular venous pulsation Normal  · The carotid upstroke is normal in amplitude and contour without delay or bruit  · Peripheral pulses are symmetrical and full   Abdomen:  · No masses or tenderness  · Bowel sounds present  Extremities:  ·  No cyanosis or clubbing  ·  Lower extremity edema: No  ·  Skin: Warm and dry  Neurological:  · Alert and oriented. · Moves all extremities well  · No abnormalities of mood, affect, memory, mentation, or behavior are noted    DATA:    ECG 11/20/2018:  Atrial fibrillation     Echo 11/12/2012:  1. Technically difficult study. Left ventricular systolic function  appears preserved. There is probable left ventricular hypertrophy. 2. Left atrium is enlarged. GXT 12/12/2011:  CONCLUSIONS-  1. Mild fitness impairment for age. 2. Nondiagnostic study due to inadequate cardiac stress but no  diagnostic abnormalities at a heart rate of 116. CARDIOLOGY LABS:   CBC: No results for input(s): WBC, HGB, HCT, PLT in the last 72 hours. BMP: No results for input(s): NA, K, CO2, BUN, CREATININE, LABGLOM, GLUCOSE in the last 72 hours. PT/INR: No results for input(s): PROTIME, INR in the last 72 hours. APTT:No results for input(s): APTT in the last 72 hours. FASTING LIPID PANEL:  Lab Results   Component Value Date    HDL 36 01/04/2017    HDL 43 04/06/2012    LDLDIRECT 146 07/05/2016    LDLCALC 87 01/04/2017    TRIG 281 01/04/2017     LIVER PROFILE:No results for input(s): AST, ALT, ALB in the last 72 hours. Assessment:  1. Atrial fibrillation of unknown duration: known history of PAF, currently asymptomatic but previously symptomatic    -HR elevated   -[id number] 3 (age, HTN, DM)  2. HTN: controlled  3.  DM:

## 2018-12-17 ENCOUNTER — ANTI-COAG VISIT (OUTPATIENT)
Dept: PHARMACY | Age: 68
End: 2018-12-17
Payer: MEDICARE

## 2018-12-17 DIAGNOSIS — Z79.01 LONG TERM CURRENT USE OF ANTICOAGULANT: ICD-10-CM

## 2018-12-17 DIAGNOSIS — I48.20 CHRONIC ATRIAL FIBRILLATION (HCC): ICD-10-CM

## 2018-12-17 LAB — INR BLD: 2.2

## 2018-12-17 PROCEDURE — 99211 OFF/OP EST MAY X REQ PHY/QHP: CPT | Performed by: INTERNAL MEDICINE

## 2018-12-17 PROCEDURE — 85610 PROTHROMBIN TIME: CPT | Performed by: INTERNAL MEDICINE

## 2018-12-17 NOTE — PROGRESS NOTES
Mr. Liseth Lange is here for management of anticoagulation for AFib. PMH also significant for HTN, HLD, and DM. He presents today w/out complaint. Pt verified dosing regimen. Pt denies s/s bleeding/bruising/swelling/SOB. No BRBPR. No melena. No missed doses. No changes in Rx/OTC/herbal medications. No major changes in diet. Denies EtOH and tobacco use. Pt states he stopped benazepril and is now taking flecainide. Returns to Dr Verona Babinski on 12/20 to assess effectiveness of new medication. INR 2.2 is within acceptable therapeutic range of 2-3. Recommended to continue 5 mg daily, except 2.5 mg every Mon and Thurs. Pt has 5 mg tabs. Will continue to monitor and check INR in 4 weeks. Dosing reminder card given with phone number, appointment date and time. Return to clinic: 1/14 @ 7:00 AM (patient likes 7am appts)  Referring physician: Dr. Odalys Apodaca, PharmD Candidate 2019    I have seen the patient and reviewed the progress note written by the PharmD Candidate. I agree with this assessment and plan.    Basilio Velez PharmD 12/17/2018 10:28 AM

## 2019-01-07 ENCOUNTER — OFFICE VISIT (OUTPATIENT)
Dept: CARDIOLOGY CLINIC | Age: 69
End: 2019-01-07
Payer: MEDICARE

## 2019-01-07 VITALS
OXYGEN SATURATION: 98 % | HEIGHT: 69 IN | BODY MASS INDEX: 28.79 KG/M2 | HEART RATE: 52 BPM | DIASTOLIC BLOOD PRESSURE: 64 MMHG | WEIGHT: 194.4 LBS | SYSTOLIC BLOOD PRESSURE: 122 MMHG

## 2019-01-07 DIAGNOSIS — R94.31 ABNORMAL EKG: ICD-10-CM

## 2019-01-07 DIAGNOSIS — I48.20 CHRONIC ATRIAL FIBRILLATION (HCC): Primary | ICD-10-CM

## 2019-01-07 PROCEDURE — 99215 OFFICE O/P EST HI 40 MIN: CPT | Performed by: INTERNAL MEDICINE

## 2019-01-07 PROCEDURE — 1101F PT FALLS ASSESS-DOCD LE1/YR: CPT | Performed by: INTERNAL MEDICINE

## 2019-01-07 PROCEDURE — G8419 CALC BMI OUT NRM PARAM NOF/U: HCPCS | Performed by: INTERNAL MEDICINE

## 2019-01-07 PROCEDURE — 1036F TOBACCO NON-USER: CPT | Performed by: INTERNAL MEDICINE

## 2019-01-07 PROCEDURE — 1123F ACP DISCUSS/DSCN MKR DOCD: CPT | Performed by: INTERNAL MEDICINE

## 2019-01-07 PROCEDURE — 93000 ELECTROCARDIOGRAM COMPLETE: CPT | Performed by: INTERNAL MEDICINE

## 2019-01-07 PROCEDURE — 4040F PNEUMOC VAC/ADMIN/RCVD: CPT | Performed by: INTERNAL MEDICINE

## 2019-01-07 PROCEDURE — 3017F COLORECTAL CA SCREEN DOC REV: CPT | Performed by: INTERNAL MEDICINE

## 2019-01-07 PROCEDURE — G8427 DOCREV CUR MEDS BY ELIG CLIN: HCPCS | Performed by: INTERNAL MEDICINE

## 2019-01-07 PROCEDURE — G8484 FLU IMMUNIZE NO ADMIN: HCPCS | Performed by: INTERNAL MEDICINE

## 2019-01-11 ENCOUNTER — HOSPITAL ENCOUNTER (OUTPATIENT)
Dept: CARDIOLOGY | Age: 69
Discharge: HOME | End: 2019-01-11
Payer: MEDICARE

## 2019-01-11 ENCOUNTER — TELEPHONE (OUTPATIENT)
Dept: CARDIOLOGY CLINIC | Age: 69
End: 2019-01-11

## 2019-01-11 LAB
LV EF: 55 %
LVEF MODALITY: NORMAL

## 2019-01-11 PROCEDURE — 93306 TTE W/DOPPLER COMPLETE: CPT

## 2019-01-14 ENCOUNTER — ANTI-COAG VISIT (OUTPATIENT)
Dept: PHARMACY | Age: 69
End: 2019-01-14
Payer: MEDICARE

## 2019-01-14 ENCOUNTER — TELEPHONE (OUTPATIENT)
Dept: CARDIOLOGY CLINIC | Age: 69
End: 2019-01-14

## 2019-01-14 DIAGNOSIS — I48.20 CHRONIC ATRIAL FIBRILLATION (HCC): ICD-10-CM

## 2019-01-14 DIAGNOSIS — Z79.01 LONG TERM CURRENT USE OF ANTICOAGULANT: ICD-10-CM

## 2019-01-14 LAB — INR BLD: 1.6

## 2019-01-14 PROCEDURE — 99211 OFF/OP EST MAY X REQ PHY/QHP: CPT | Performed by: INTERNAL MEDICINE

## 2019-01-14 PROCEDURE — 85610 PROTHROMBIN TIME: CPT | Performed by: INTERNAL MEDICINE

## 2019-01-28 ENCOUNTER — ANTI-COAG VISIT (OUTPATIENT)
Dept: PHARMACY | Age: 69
End: 2019-01-28
Payer: MEDICARE

## 2019-01-28 DIAGNOSIS — I48.20 CHRONIC ATRIAL FIBRILLATION (HCC): ICD-10-CM

## 2019-01-28 DIAGNOSIS — Z79.01 LONG TERM CURRENT USE OF ANTICOAGULANT: ICD-10-CM

## 2019-01-28 LAB — INR BLD: 1.3

## 2019-01-28 PROCEDURE — 85610 PROTHROMBIN TIME: CPT | Performed by: INTERNAL MEDICINE

## 2019-01-28 PROCEDURE — 99211 OFF/OP EST MAY X REQ PHY/QHP: CPT | Performed by: INTERNAL MEDICINE

## 2019-02-11 ENCOUNTER — ANTI-COAG VISIT (OUTPATIENT)
Dept: PHARMACY | Age: 69
End: 2019-02-11
Payer: MEDICARE

## 2019-02-11 DIAGNOSIS — I48.20 CHRONIC ATRIAL FIBRILLATION (HCC): ICD-10-CM

## 2019-02-11 DIAGNOSIS — Z79.01 LONG TERM CURRENT USE OF ANTICOAGULANT: ICD-10-CM

## 2019-02-11 LAB — INR BLD: 2

## 2019-02-11 PROCEDURE — 85610 PROTHROMBIN TIME: CPT

## 2019-02-11 PROCEDURE — 99211 OFF/OP EST MAY X REQ PHY/QHP: CPT

## 2019-02-28 ENCOUNTER — ANESTHESIA (OUTPATIENT)
Dept: CARDIAC CATH/INVASIVE PROCEDURES | Age: 69
End: 2019-02-28
Payer: MEDICARE

## 2019-02-28 ENCOUNTER — ANESTHESIA EVENT (OUTPATIENT)
Dept: CARDIAC CATH/INVASIVE PROCEDURES | Age: 69
End: 2019-02-28
Payer: MEDICARE

## 2019-02-28 ENCOUNTER — HOSPITAL ENCOUNTER (OUTPATIENT)
Dept: CARDIAC CATH/INVASIVE PROCEDURES | Age: 69
Discharge: HOME | End: 2019-03-01
Attending: INTERNAL MEDICINE | Admitting: INTERNAL MEDICINE
Payer: MEDICARE

## 2019-02-28 VITALS — OXYGEN SATURATION: 100 % | TEMPERATURE: 97.7 F | DIASTOLIC BLOOD PRESSURE: 96 MMHG | SYSTOLIC BLOOD PRESSURE: 156 MMHG

## 2019-02-28 PROBLEM — I48.0 PAF (PAROXYSMAL ATRIAL FIBRILLATION) (HCC): Status: ACTIVE | Noted: 2019-02-28

## 2019-02-28 LAB
A/G RATIO: 1.4 (ref 1.1–2.2)
ALBUMIN SERPL-MCNC: 4.2 G/DL (ref 3.4–5)
ALP BLD-CCNC: 37 U/L (ref 40–129)
ALT SERPL-CCNC: 27 U/L (ref 10–40)
ANION GAP SERPL CALCULATED.3IONS-SCNC: 13 MMOL/L (ref 3–16)
AST SERPL-CCNC: 24 U/L (ref 15–37)
BILIRUB SERPL-MCNC: 0.6 MG/DL (ref 0–1)
BUN BLDV-MCNC: 29 MG/DL (ref 7–20)
CALCIUM SERPL-MCNC: 10.5 MG/DL (ref 8.3–10.6)
CHLORIDE BLD-SCNC: 104 MMOL/L (ref 99–110)
CO2: 25 MMOL/L (ref 21–32)
CREAT SERPL-MCNC: 1.1 MG/DL (ref 0.8–1.3)
EKG ATRIAL RATE: 131 BPM
EKG DIAGNOSIS: NORMAL
EKG Q-T INTERVAL: 328 MS
EKG QRS DURATION: 96 MS
EKG QTC CALCULATION (BAZETT): 495 MS
EKG R AXIS: -18 DEGREES
EKG T AXIS: 146 DEGREES
EKG VENTRICULAR RATE: 137 BPM
GFR AFRICAN AMERICAN: >60
GFR NON-AFRICAN AMERICAN: >60
GLOBULIN: 2.9 G/DL
GLUCOSE BLD-MCNC: 130 MG/DL (ref 70–99)
GLUCOSE BLD-MCNC: 131 MG/DL (ref 70–99)
GLUCOSE BLD-MCNC: 136 MG/DL (ref 70–99)
GLUCOSE BLD-MCNC: 162 MG/DL (ref 70–99)
HCT VFR BLD CALC: 49.2 % (ref 40.5–52.5)
HEMOGLOBIN: 16.2 G/DL (ref 13.5–17.5)
INR BLD: 3.05 (ref 0.86–1.14)
LV EF: 58 %
LVEF MODALITY: NORMAL
MCH RBC QN AUTO: 29.7 PG (ref 26–34)
MCHC RBC AUTO-ENTMCNC: 32.9 G/DL (ref 31–36)
MCV RBC AUTO: 90.1 FL (ref 80–100)
PDW BLD-RTO: 14.4 % (ref 12.4–15.4)
PERFORMED ON: ABNORMAL
PLATELET # BLD: 218 K/UL (ref 135–450)
PMV BLD AUTO: 8.9 FL (ref 5–10.5)
POC ACT LR: 253 SEC
POC ACT LR: 272 SEC
POC ACT LR: 389 SEC
POC ACT LR: >400 SEC
POTASSIUM SERPL-SCNC: 3.7 MMOL/L (ref 3.5–5.1)
PROTHROMBIN TIME: 34.8 SEC (ref 9.8–13)
RBC # BLD: 5.46 M/UL (ref 4.2–5.9)
SODIUM BLD-SCNC: 142 MMOL/L (ref 136–145)
TOTAL PROTEIN: 7.1 G/DL (ref 6.4–8.2)
WBC # BLD: 5.5 K/UL (ref 4–11)

## 2019-02-28 PROCEDURE — 2060000000 HC ICU INTERMEDIATE R&B

## 2019-02-28 PROCEDURE — 7100000000 HC PACU RECOVERY - FIRST 15 MIN

## 2019-02-28 PROCEDURE — 93623 PRGRMD STIMJ&PACG IV RX NFS: CPT

## 2019-02-28 PROCEDURE — 2500000003 HC RX 250 WO HCPCS

## 2019-02-28 PROCEDURE — 7100000001 HC PACU RECOVERY - ADDTL 15 MIN

## 2019-02-28 PROCEDURE — C1730 CATH, EP, 19 OR FEW ELECT: HCPCS

## 2019-02-28 PROCEDURE — 6360000002 HC RX W HCPCS: Performed by: NURSE ANESTHETIST, CERTIFIED REGISTERED

## 2019-02-28 PROCEDURE — 93662 INTRACARDIAC ECG (ICE): CPT

## 2019-02-28 PROCEDURE — 2580000003 HC RX 258: Performed by: INTERNAL MEDICINE

## 2019-02-28 PROCEDURE — 3700000001 HC ADD 15 MINUTES (ANESTHESIA)

## 2019-02-28 PROCEDURE — 6360000002 HC RX W HCPCS

## 2019-02-28 PROCEDURE — 93010 ELECTROCARDIOGRAM REPORT: CPT | Performed by: INTERNAL MEDICINE

## 2019-02-28 PROCEDURE — 93325 DOPPLER ECHO COLOR FLOW MAPG: CPT

## 2019-02-28 PROCEDURE — 93662 INTRACARDIAC ECG (ICE): CPT | Performed by: INTERNAL MEDICINE

## 2019-02-28 PROCEDURE — 85347 COAGULATION TIME ACTIVATED: CPT

## 2019-02-28 PROCEDURE — C1732 CATH, EP, DIAG/ABL, 3D/VECT: HCPCS

## 2019-02-28 PROCEDURE — 93320 DOPPLER ECHO COMPLETE: CPT

## 2019-02-28 PROCEDURE — 80053 COMPREHEN METABOLIC PANEL: CPT

## 2019-02-28 PROCEDURE — 93005 ELECTROCARDIOGRAM TRACING: CPT | Performed by: INTERNAL MEDICINE

## 2019-02-28 PROCEDURE — 2709999900 HC NON-CHARGEABLE SUPPLY

## 2019-02-28 PROCEDURE — 93613 INTRACARDIAC EPHYS 3D MAPG: CPT | Performed by: INTERNAL MEDICINE

## 2019-02-28 PROCEDURE — C1759 CATH, INTRA ECHOCARDIOGRAPHY: HCPCS

## 2019-02-28 PROCEDURE — 85027 COMPLETE CBC AUTOMATED: CPT

## 2019-02-28 PROCEDURE — 93656 COMPRE EP EVAL ABLTJ ATR FIB: CPT | Performed by: INTERNAL MEDICINE

## 2019-02-28 PROCEDURE — 6370000000 HC RX 637 (ALT 250 FOR IP): Performed by: INTERNAL MEDICINE

## 2019-02-28 PROCEDURE — 93613 INTRACARDIAC EPHYS 3D MAPG: CPT

## 2019-02-28 PROCEDURE — 93312 ECHO TRANSESOPHAGEAL: CPT

## 2019-02-28 PROCEDURE — 2580000003 HC RX 258

## 2019-02-28 PROCEDURE — 2500000003 HC RX 250 WO HCPCS: Performed by: NURSE ANESTHETIST, CERTIFIED REGISTERED

## 2019-02-28 PROCEDURE — C1769 GUIDE WIRE: HCPCS

## 2019-02-28 PROCEDURE — 2580000003 HC RX 258: Performed by: NURSE ANESTHETIST, CERTIFIED REGISTERED

## 2019-02-28 PROCEDURE — C1894 INTRO/SHEATH, NON-LASER: HCPCS

## 2019-02-28 PROCEDURE — 85610 PROTHROMBIN TIME: CPT

## 2019-02-28 PROCEDURE — 3700000000 HC ANESTHESIA ATTENDED CARE

## 2019-02-28 PROCEDURE — 93656 COMPRE EP EVAL ABLTJ ATR FIB: CPT

## 2019-02-28 RX ORDER — ATORVASTATIN CALCIUM 40 MG/1
40 TABLET, FILM COATED ORAL NIGHTLY
Status: DISCONTINUED | OUTPATIENT
Start: 2019-02-28 | End: 2019-03-01 | Stop reason: HOSPADM

## 2019-02-28 RX ORDER — HYDROCHLOROTHIAZIDE 25 MG/1
12.5 TABLET ORAL DAILY
Status: DISCONTINUED | OUTPATIENT
Start: 2019-03-01 | End: 2019-03-01 | Stop reason: HOSPADM

## 2019-02-28 RX ORDER — ONDANSETRON 2 MG/ML
INJECTION INTRAMUSCULAR; INTRAVENOUS PRN
Status: DISCONTINUED | OUTPATIENT
Start: 2019-02-28 | End: 2019-02-28 | Stop reason: SDUPTHER

## 2019-02-28 RX ORDER — ONDANSETRON 2 MG/ML
4 INJECTION INTRAMUSCULAR; INTRAVENOUS PRN
Status: DISCONTINUED | OUTPATIENT
Start: 2019-02-28 | End: 2019-02-28 | Stop reason: SINTOL

## 2019-02-28 RX ORDER — SODIUM CHLORIDE 9 MG/ML
INJECTION, SOLUTION INTRAVENOUS ONCE
Status: COMPLETED | OUTPATIENT
Start: 2019-02-28 | End: 2019-02-28

## 2019-02-28 RX ORDER — DIPHENHYDRAMINE HYDROCHLORIDE 50 MG/ML
12.5 INJECTION INTRAMUSCULAR; INTRAVENOUS
Status: ACTIVE | OUTPATIENT
Start: 2019-02-28 | End: 2019-02-28

## 2019-02-28 RX ORDER — FENTANYL CITRATE 50 UG/ML
INJECTION, SOLUTION INTRAMUSCULAR; INTRAVENOUS PRN
Status: DISCONTINUED | OUTPATIENT
Start: 2019-02-28 | End: 2019-02-28 | Stop reason: SDUPTHER

## 2019-02-28 RX ORDER — SODIUM CHLORIDE 0.9 % (FLUSH) 0.9 %
10 SYRINGE (ML) INJECTION EVERY 12 HOURS SCHEDULED
Status: DISCONTINUED | OUTPATIENT
Start: 2019-02-28 | End: 2019-03-01 | Stop reason: HOSPADM

## 2019-02-28 RX ORDER — ROCURONIUM BROMIDE 10 MG/ML
INJECTION, SOLUTION INTRAVENOUS PRN
Status: DISCONTINUED | OUTPATIENT
Start: 2019-02-28 | End: 2019-02-28 | Stop reason: SDUPTHER

## 2019-02-28 RX ORDER — MIDAZOLAM HYDROCHLORIDE 1 MG/ML
INJECTION INTRAMUSCULAR; INTRAVENOUS PRN
Status: DISCONTINUED | OUTPATIENT
Start: 2019-02-28 | End: 2019-02-28 | Stop reason: SDUPTHER

## 2019-02-28 RX ORDER — METOPROLOL SUCCINATE 25 MG/1
100 TABLET, EXTENDED RELEASE ORAL DAILY
Status: DISCONTINUED | OUTPATIENT
Start: 2019-02-28 | End: 2019-03-01 | Stop reason: HOSPADM

## 2019-02-28 RX ORDER — DEXTROSE MONOHYDRATE 50 MG/ML
100 INJECTION, SOLUTION INTRAVENOUS PRN
Status: DISCONTINUED | OUTPATIENT
Start: 2019-02-28 | End: 2019-03-01 | Stop reason: HOSPADM

## 2019-02-28 RX ORDER — DEXTROSE MONOHYDRATE 25 G/50ML
12.5 INJECTION, SOLUTION INTRAVENOUS PRN
Status: DISCONTINUED | OUTPATIENT
Start: 2019-02-28 | End: 2019-03-01 | Stop reason: HOSPADM

## 2019-02-28 RX ORDER — PROMETHAZINE HYDROCHLORIDE 25 MG/ML
6.25 INJECTION, SOLUTION INTRAMUSCULAR; INTRAVENOUS
Status: DISCONTINUED | OUTPATIENT
Start: 2019-02-28 | End: 2019-03-01 | Stop reason: HOSPADM

## 2019-02-28 RX ORDER — MEPERIDINE HYDROCHLORIDE 50 MG/ML
12.5 INJECTION INTRAMUSCULAR; INTRAVENOUS; SUBCUTANEOUS EVERY 5 MIN PRN
Status: DISCONTINUED | OUTPATIENT
Start: 2019-02-28 | End: 2019-03-01 | Stop reason: HOSPADM

## 2019-02-28 RX ORDER — LOSARTAN POTASSIUM AND HYDROCHLOROTHIAZIDE 12.5; 1 MG/1; MG/1
1 TABLET ORAL DAILY
Status: DISCONTINUED | OUTPATIENT
Start: 2019-03-01 | End: 2019-02-28 | Stop reason: CLARIF

## 2019-02-28 RX ORDER — HYDRALAZINE HYDROCHLORIDE 20 MG/ML
5 INJECTION INTRAMUSCULAR; INTRAVENOUS EVERY 10 MIN PRN
Status: DISCONTINUED | OUTPATIENT
Start: 2019-02-28 | End: 2019-03-01 | Stop reason: HOSPADM

## 2019-02-28 RX ORDER — LABETALOL HYDROCHLORIDE 5 MG/ML
5 INJECTION, SOLUTION INTRAVENOUS EVERY 10 MIN PRN
Status: DISCONTINUED | OUTPATIENT
Start: 2019-02-28 | End: 2019-03-01 | Stop reason: HOSPADM

## 2019-02-28 RX ORDER — MORPHINE SULFATE 4 MG/ML
2 INJECTION, SOLUTION INTRAMUSCULAR; INTRAVENOUS EVERY 5 MIN PRN
Status: DISCONTINUED | OUTPATIENT
Start: 2019-02-28 | End: 2019-03-01 | Stop reason: HOSPADM

## 2019-02-28 RX ORDER — NICOTINE POLACRILEX 4 MG
15 LOZENGE BUCCAL PRN
Status: DISCONTINUED | OUTPATIENT
Start: 2019-02-28 | End: 2019-03-01 | Stop reason: HOSPADM

## 2019-02-28 RX ORDER — MORPHINE SULFATE 4 MG/ML
1 INJECTION, SOLUTION INTRAMUSCULAR; INTRAVENOUS EVERY 5 MIN PRN
Status: DISCONTINUED | OUTPATIENT
Start: 2019-02-28 | End: 2019-03-01 | Stop reason: HOSPADM

## 2019-02-28 RX ORDER — LIDOCAINE HYDROCHLORIDE 20 MG/ML
INJECTION, SOLUTION INFILTRATION; PERINEURAL PRN
Status: DISCONTINUED | OUTPATIENT
Start: 2019-02-28 | End: 2019-02-28 | Stop reason: SDUPTHER

## 2019-02-28 RX ORDER — OXYCODONE HYDROCHLORIDE AND ACETAMINOPHEN 5; 325 MG/1; MG/1
2 TABLET ORAL PRN
Status: ACTIVE | OUTPATIENT
Start: 2019-02-28 | End: 2019-02-28

## 2019-02-28 RX ORDER — CHOLECALCIFEROL (VITAMIN D3) 125 MCG
1200 CAPSULE ORAL DAILY
Status: DISCONTINUED | OUTPATIENT
Start: 2019-03-01 | End: 2019-03-01 | Stop reason: HOSPADM

## 2019-02-28 RX ORDER — HYDROCODONE BITARTRATE AND ACETAMINOPHEN 7.5; 325 MG/1; MG/1
1 TABLET ORAL EVERY 4 HOURS PRN
Status: DISCONTINUED | OUTPATIENT
Start: 2019-02-28 | End: 2019-03-01 | Stop reason: HOSPADM

## 2019-02-28 RX ORDER — LOSARTAN POTASSIUM 25 MG/1
100 TABLET ORAL DAILY
Status: DISCONTINUED | OUTPATIENT
Start: 2019-03-01 | End: 2019-03-01 | Stop reason: HOSPADM

## 2019-02-28 RX ORDER — SODIUM CHLORIDE 0.9 % (FLUSH) 0.9 %
10 SYRINGE (ML) INJECTION PRN
Status: DISCONTINUED | OUTPATIENT
Start: 2019-02-28 | End: 2019-03-01 | Stop reason: HOSPADM

## 2019-02-28 RX ORDER — ACETAMINOPHEN 325 MG/1
650 TABLET ORAL EVERY 4 HOURS PRN
Status: DISCONTINUED | OUTPATIENT
Start: 2019-02-28 | End: 2019-03-01 | Stop reason: HOSPADM

## 2019-02-28 RX ORDER — DEXAMETHASONE SODIUM PHOSPHATE 4 MG/ML
INJECTION, SOLUTION INTRA-ARTICULAR; INTRALESIONAL; INTRAMUSCULAR; INTRAVENOUS; SOFT TISSUE PRN
Status: DISCONTINUED | OUTPATIENT
Start: 2019-02-28 | End: 2019-02-28 | Stop reason: SDUPTHER

## 2019-02-28 RX ORDER — FLECAINIDE ACETATE 100 MG/1
100 TABLET ORAL DAILY
Status: DISCONTINUED | OUTPATIENT
Start: 2019-02-28 | End: 2019-03-01 | Stop reason: HOSPADM

## 2019-02-28 RX ORDER — OXYCODONE HYDROCHLORIDE AND ACETAMINOPHEN 5; 325 MG/1; MG/1
1 TABLET ORAL PRN
Status: ACTIVE | OUTPATIENT
Start: 2019-02-28 | End: 2019-02-28

## 2019-02-28 RX ORDER — PROPOFOL 10 MG/ML
INJECTION, EMULSION INTRAVENOUS PRN
Status: DISCONTINUED | OUTPATIENT
Start: 2019-02-28 | End: 2019-02-28 | Stop reason: SDUPTHER

## 2019-02-28 RX ORDER — M-VIT,TX,IRON,MINS/CALC/FOLIC 27MG-0.4MG
1 TABLET ORAL DAILY
Status: DISCONTINUED | OUTPATIENT
Start: 2019-03-01 | End: 2019-03-01 | Stop reason: HOSPADM

## 2019-02-28 RX ORDER — FENOFIBRATE 160 MG/1
160 TABLET ORAL DAILY
Status: DISCONTINUED | OUTPATIENT
Start: 2019-03-01 | End: 2019-03-01 | Stop reason: HOSPADM

## 2019-02-28 RX ADMIN — ATORVASTATIN CALCIUM 40 MG: 40 TABLET, FILM COATED ORAL at 20:13

## 2019-02-28 RX ADMIN — LIDOCAINE HYDROCHLORIDE 50 MG: 20 INJECTION, SOLUTION INFILTRATION; PERINEURAL at 08:00

## 2019-02-28 RX ADMIN — PHENYLEPHRINE HYDROCHLORIDE 300 MCG: 10 INJECTION INTRAVENOUS at 08:25

## 2019-02-28 RX ADMIN — ONDANSETRON 4 MG: 2 INJECTION INTRAMUSCULAR; INTRAVENOUS at 13:04

## 2019-02-28 RX ADMIN — PHENYLEPHRINE HYDROCHLORIDE 100 MCG: 10 INJECTION INTRAVENOUS at 12:55

## 2019-02-28 RX ADMIN — ROCURONIUM BROMIDE 30 MG: 10 SOLUTION INTRAVENOUS at 10:19

## 2019-02-28 RX ADMIN — PROPOFOL 30 MG: 10 INJECTION, EMULSION INTRAVENOUS at 08:05

## 2019-02-28 RX ADMIN — ONDANSETRON 4 MG: 2 INJECTION INTRAMUSCULAR; INTRAVENOUS at 08:00

## 2019-02-28 RX ADMIN — PROPOFOL 160 MG: 10 INJECTION, EMULSION INTRAVENOUS at 08:20

## 2019-02-28 RX ADMIN — ROCURONIUM BROMIDE 20 MG: 10 SOLUTION INTRAVENOUS at 09:23

## 2019-02-28 RX ADMIN — FLECAINIDE ACETATE 100 MG: 100 TABLET ORAL at 16:14

## 2019-02-28 RX ADMIN — SODIUM CHLORIDE: 9 INJECTION, SOLUTION INTRAVENOUS at 07:17

## 2019-02-28 RX ADMIN — FENTANYL CITRATE 50 MCG: 50 INJECTION, SOLUTION INTRAMUSCULAR; INTRAVENOUS at 08:20

## 2019-02-28 RX ADMIN — PHENYLEPHRINE HYDROCHLORIDE 86 MCG/MIN: 10 INJECTION INTRAVENOUS at 08:27

## 2019-02-28 RX ADMIN — PHENYLEPHRINE HYDROCHLORIDE 100 MCG: 10 INJECTION INTRAVENOUS at 13:01

## 2019-02-28 RX ADMIN — INSULIN LISPRO 1 UNITS: 100 INJECTION, SOLUTION INTRAVENOUS; SUBCUTANEOUS at 20:14

## 2019-02-28 RX ADMIN — MIDAZOLAM HYDROCHLORIDE 2 MG: 2 INJECTION, SOLUTION INTRAMUSCULAR; INTRAVENOUS at 08:20

## 2019-02-28 RX ADMIN — ROCURONIUM BROMIDE 20 MG: 10 SOLUTION INTRAVENOUS at 11:45

## 2019-02-28 RX ADMIN — FENTANYL CITRATE 50 MCG: 50 INJECTION, SOLUTION INTRAMUSCULAR; INTRAVENOUS at 10:07

## 2019-02-28 RX ADMIN — DEXAMETHASONE SODIUM PHOSPHATE 8 MG: 4 INJECTION, SOLUTION INTRAMUSCULAR; INTRAVENOUS at 08:30

## 2019-02-28 RX ADMIN — METOPROLOL SUCCINATE 100 MG: 25 TABLET, EXTENDED RELEASE ORAL at 16:13

## 2019-02-28 RX ADMIN — FENTANYL CITRATE 25 MCG: 50 INJECTION, SOLUTION INTRAMUSCULAR; INTRAVENOUS at 12:46

## 2019-02-28 RX ADMIN — PROPOFOL 100 MG: 10 INJECTION, EMULSION INTRAVENOUS at 08:00

## 2019-02-28 RX ADMIN — SUGAMMADEX 200 MG: 100 INJECTION, SOLUTION INTRAVENOUS at 13:15

## 2019-02-28 RX ADMIN — FENTANYL CITRATE 50 MCG: 50 INJECTION, SOLUTION INTRAMUSCULAR; INTRAVENOUS at 11:45

## 2019-02-28 RX ADMIN — SODIUM CHLORIDE, PRESERVATIVE FREE 10 ML: 5 INJECTION INTRAVENOUS at 20:13

## 2019-02-28 RX ADMIN — ROCURONIUM BROMIDE 50 MG: 10 SOLUTION INTRAVENOUS at 08:20

## 2019-02-28 ASSESSMENT — PULMONARY FUNCTION TESTS
PIF_VALUE: 23
PIF_VALUE: 22
PIF_VALUE: 19
PIF_VALUE: 24
PIF_VALUE: 23
PIF_VALUE: 23
PIF_VALUE: 22
PIF_VALUE: 23
PIF_VALUE: 22
PIF_VALUE: 0
PIF_VALUE: 22
PIF_VALUE: 22
PIF_VALUE: 23
PIF_VALUE: 23
PIF_VALUE: 22
PIF_VALUE: 23
PIF_VALUE: 21
PIF_VALUE: 22
PIF_VALUE: 21
PIF_VALUE: 1
PIF_VALUE: 21
PIF_VALUE: 23
PIF_VALUE: 3
PIF_VALUE: 23
PIF_VALUE: 22
PIF_VALUE: 24
PIF_VALUE: 0
PIF_VALUE: 23
PIF_VALUE: 22
PIF_VALUE: 20
PIF_VALUE: 22
PIF_VALUE: 22
PIF_VALUE: 24
PIF_VALUE: 19
PIF_VALUE: 24
PIF_VALUE: 22
PIF_VALUE: 21
PIF_VALUE: 22
PIF_VALUE: 21
PIF_VALUE: 22
PIF_VALUE: 23
PIF_VALUE: 19
PIF_VALUE: 22
PIF_VALUE: 22
PIF_VALUE: 24
PIF_VALUE: 23
PIF_VALUE: 22
PIF_VALUE: 20
PIF_VALUE: 22
PIF_VALUE: 24
PIF_VALUE: 0
PIF_VALUE: 21
PIF_VALUE: 22
PIF_VALUE: 24
PIF_VALUE: 2
PIF_VALUE: 22
PIF_VALUE: 22
PIF_VALUE: 23
PIF_VALUE: 23
PIF_VALUE: 0
PIF_VALUE: 20
PIF_VALUE: 20
PIF_VALUE: 22
PIF_VALUE: 16
PIF_VALUE: 2
PIF_VALUE: 23
PIF_VALUE: 22
PIF_VALUE: 22
PIF_VALUE: 26
PIF_VALUE: 22
PIF_VALUE: 23
PIF_VALUE: 24
PIF_VALUE: 20
PIF_VALUE: 23
PIF_VALUE: 23
PIF_VALUE: 22
PIF_VALUE: 22
PIF_VALUE: 23
PIF_VALUE: 22
PIF_VALUE: 21
PIF_VALUE: 23
PIF_VALUE: 24
PIF_VALUE: 22
PIF_VALUE: 23
PIF_VALUE: 22
PIF_VALUE: 22
PIF_VALUE: 24
PIF_VALUE: 23
PIF_VALUE: 23
PIF_VALUE: 21
PIF_VALUE: 22
PIF_VALUE: 22
PIF_VALUE: 23
PIF_VALUE: 22
PIF_VALUE: 23
PIF_VALUE: 23
PIF_VALUE: 22
PIF_VALUE: 21
PIF_VALUE: 1
PIF_VALUE: 22
PIF_VALUE: 20
PIF_VALUE: 22
PIF_VALUE: 23
PIF_VALUE: 22
PIF_VALUE: 22
PIF_VALUE: 19
PIF_VALUE: 24
PIF_VALUE: 24
PIF_VALUE: 23
PIF_VALUE: 22
PIF_VALUE: 23
PIF_VALUE: 22
PIF_VALUE: 22
PIF_VALUE: 19
PIF_VALUE: 23
PIF_VALUE: 0
PIF_VALUE: 22
PIF_VALUE: 24
PIF_VALUE: 24
PIF_VALUE: 23
PIF_VALUE: 22
PIF_VALUE: 19
PIF_VALUE: 19
PIF_VALUE: 22
PIF_VALUE: 24
PIF_VALUE: 21
PIF_VALUE: 21
PIF_VALUE: 19
PIF_VALUE: 23
PIF_VALUE: 22
PIF_VALUE: 22
PIF_VALUE: 23
PIF_VALUE: 22
PIF_VALUE: 24
PIF_VALUE: 22
PIF_VALUE: 0
PIF_VALUE: 24
PIF_VALUE: 22
PIF_VALUE: 22
PIF_VALUE: 19
PIF_VALUE: 21
PIF_VALUE: 21
PIF_VALUE: 0
PIF_VALUE: 22
PIF_VALUE: 25
PIF_VALUE: 23
PIF_VALUE: 22
PIF_VALUE: 23
PIF_VALUE: 23
PIF_VALUE: 22
PIF_VALUE: 23
PIF_VALUE: 22
PIF_VALUE: 23
PIF_VALUE: 22
PIF_VALUE: 21
PIF_VALUE: 24
PIF_VALUE: 22
PIF_VALUE: 22
PIF_VALUE: 23
PIF_VALUE: 23
PIF_VALUE: 22
PIF_VALUE: 22
PIF_VALUE: 23
PIF_VALUE: 22
PIF_VALUE: 15
PIF_VALUE: 0
PIF_VALUE: 23
PIF_VALUE: 24
PIF_VALUE: 22
PIF_VALUE: 24
PIF_VALUE: 20
PIF_VALUE: 22
PIF_VALUE: 20
PIF_VALUE: 16
PIF_VALUE: 22
PIF_VALUE: 23
PIF_VALUE: 23
PIF_VALUE: 24
PIF_VALUE: 24
PIF_VALUE: 22
PIF_VALUE: 22
PIF_VALUE: 24
PIF_VALUE: 22
PIF_VALUE: 22
PIF_VALUE: 20
PIF_VALUE: 21
PIF_VALUE: 24
PIF_VALUE: 22
PIF_VALUE: 19
PIF_VALUE: 24
PIF_VALUE: 21
PIF_VALUE: 20
PIF_VALUE: 23
PIF_VALUE: 22
PIF_VALUE: 24
PIF_VALUE: 24
PIF_VALUE: 22
PIF_VALUE: 21
PIF_VALUE: 22
PIF_VALUE: 21
PIF_VALUE: 22
PIF_VALUE: 20
PIF_VALUE: 23
PIF_VALUE: 23
PIF_VALUE: 22
PIF_VALUE: 22
PIF_VALUE: 23
PIF_VALUE: 24
PIF_VALUE: 23
PIF_VALUE: 20
PIF_VALUE: 23
PIF_VALUE: 4
PIF_VALUE: 23
PIF_VALUE: 22
PIF_VALUE: 22
PIF_VALUE: 23
PIF_VALUE: 22
PIF_VALUE: 22
PIF_VALUE: 23
PIF_VALUE: 27
PIF_VALUE: 23
PIF_VALUE: 21
PIF_VALUE: 23
PIF_VALUE: 22
PIF_VALUE: 24
PIF_VALUE: 21
PIF_VALUE: 22
PIF_VALUE: 24
PIF_VALUE: 20
PIF_VALUE: 23
PIF_VALUE: 24
PIF_VALUE: 23
PIF_VALUE: 20
PIF_VALUE: 22
PIF_VALUE: 23
PIF_VALUE: 22
PIF_VALUE: 23
PIF_VALUE: 22
PIF_VALUE: 23
PIF_VALUE: 22
PIF_VALUE: 22
PIF_VALUE: 24
PIF_VALUE: 22
PIF_VALUE: 25
PIF_VALUE: 22
PIF_VALUE: 23
PIF_VALUE: 21
PIF_VALUE: 23
PIF_VALUE: 23
PIF_VALUE: 22
PIF_VALUE: 19
PIF_VALUE: 22
PIF_VALUE: 22
PIF_VALUE: 24
PIF_VALUE: 2
PIF_VALUE: 22
PIF_VALUE: 23
PIF_VALUE: 24
PIF_VALUE: 23
PIF_VALUE: 22
PIF_VALUE: 23
PIF_VALUE: 22
PIF_VALUE: 24
PIF_VALUE: 21
PIF_VALUE: 20
PIF_VALUE: 22
PIF_VALUE: 23
PIF_VALUE: 24
PIF_VALUE: 23
PIF_VALUE: 22
PIF_VALUE: 23
PIF_VALUE: 22
PIF_VALUE: 23
PIF_VALUE: 23
PIF_VALUE: 24
PIF_VALUE: 22
PIF_VALUE: 22
PIF_VALUE: 19
PIF_VALUE: 0
PIF_VALUE: 0
PIF_VALUE: 24
PIF_VALUE: 22
PIF_VALUE: 21
PIF_VALUE: 22
PIF_VALUE: 21
PIF_VALUE: 22

## 2019-02-28 ASSESSMENT — PAIN - FUNCTIONAL ASSESSMENT: PAIN_FUNCTIONAL_ASSESSMENT: 0-10

## 2019-03-01 VITALS
HEART RATE: 58 BPM | OXYGEN SATURATION: 94 % | BODY MASS INDEX: 29.47 KG/M2 | TEMPERATURE: 98 F | SYSTOLIC BLOOD PRESSURE: 100 MMHG | RESPIRATION RATE: 18 BRPM | HEIGHT: 69 IN | WEIGHT: 199 LBS | DIASTOLIC BLOOD PRESSURE: 65 MMHG

## 2019-03-01 LAB
GLUCOSE BLD-MCNC: 101 MG/DL (ref 70–99)
GLUCOSE BLD-MCNC: 134 MG/DL (ref 70–99)
INR BLD: 3.26 (ref 0.86–1.14)
PERFORMED ON: ABNORMAL
PERFORMED ON: ABNORMAL
PROTHROMBIN TIME: 37.2 SEC (ref 9.8–13)

## 2019-03-01 PROCEDURE — 99238 HOSP IP/OBS DSCHRG MGMT 30/<: CPT | Performed by: NURSE PRACTITIONER

## 2019-03-01 PROCEDURE — 6370000000 HC RX 637 (ALT 250 FOR IP): Performed by: INTERNAL MEDICINE

## 2019-03-01 PROCEDURE — 85610 PROTHROMBIN TIME: CPT

## 2019-03-01 PROCEDURE — 36415 COLL VENOUS BLD VENIPUNCTURE: CPT

## 2019-03-01 PROCEDURE — 2580000003 HC RX 258: Performed by: INTERNAL MEDICINE

## 2019-03-01 RX ADMIN — FENOFIBRATE 160 MG: 160 TABLET ORAL at 07:48

## 2019-03-01 RX ADMIN — LOSARTAN POTASSIUM 100 MG: 25 TABLET, FILM COATED ORAL at 07:47

## 2019-03-01 RX ADMIN — Medication 1250 MCG: at 07:47

## 2019-03-01 RX ADMIN — HYDROCHLOROTHIAZIDE 12.5 MG: 25 TABLET ORAL at 07:48

## 2019-03-01 RX ADMIN — Medication 1 TABLET: at 07:48

## 2019-03-01 RX ADMIN — METOPROLOL SUCCINATE 100 MG: 25 TABLET, EXTENDED RELEASE ORAL at 07:47

## 2019-03-01 RX ADMIN — SODIUM CHLORIDE, PRESERVATIVE FREE 10 ML: 5 INJECTION INTRAVENOUS at 07:48

## 2019-03-01 RX ADMIN — FLECAINIDE ACETATE 100 MG: 100 TABLET ORAL at 07:48

## 2019-03-01 RX ADMIN — HYDROCODONE BITARTRATE AND ACETAMINOPHEN 1 TABLET: 7.5; 325 TABLET ORAL at 00:49

## 2019-03-01 ASSESSMENT — PAIN DESCRIPTION - PAIN TYPE
TYPE: CHRONIC PAIN
TYPE: CHRONIC PAIN

## 2019-03-01 ASSESSMENT — PAIN DESCRIPTION - LOCATION
LOCATION: BACK
LOCATION: BACK

## 2019-03-01 ASSESSMENT — PAIN DESCRIPTION - ORIENTATION
ORIENTATION: LOWER
ORIENTATION: LOWER

## 2019-03-01 ASSESSMENT — PAIN SCALES - GENERAL
PAINLEVEL_OUTOF10: 1
PAINLEVEL_OUTOF10: 8
PAINLEVEL_OUTOF10: 0

## 2019-03-06 ENCOUNTER — TELEPHONE (OUTPATIENT)
Dept: CARDIOLOGY CLINIC | Age: 69
End: 2019-03-06

## 2019-03-06 DIAGNOSIS — I48.91 ATRIAL FIBRILLATION, UNSPECIFIED TYPE (HCC): Primary | ICD-10-CM

## 2019-03-11 ENCOUNTER — ANTI-COAG VISIT (OUTPATIENT)
Dept: PHARMACY | Age: 69
End: 2019-03-11
Payer: MEDICARE

## 2019-03-11 DIAGNOSIS — Z79.01 LONG TERM CURRENT USE OF ANTICOAGULANT: ICD-10-CM

## 2019-03-11 DIAGNOSIS — I48.20 CHRONIC ATRIAL FIBRILLATION (HCC): ICD-10-CM

## 2019-03-11 LAB — INR BLD: 2.6

## 2019-03-11 PROCEDURE — 85610 PROTHROMBIN TIME: CPT | Performed by: INTERNAL MEDICINE

## 2019-03-11 PROCEDURE — 99211 OFF/OP EST MAY X REQ PHY/QHP: CPT | Performed by: INTERNAL MEDICINE

## 2019-03-13 ENCOUNTER — TELEPHONE (OUTPATIENT)
Dept: CARDIOLOGY CLINIC | Age: 69
End: 2019-03-13

## 2019-03-13 DIAGNOSIS — I48.91 ATRIAL FIBRILLATION, UNSPECIFIED TYPE (HCC): Primary | ICD-10-CM

## 2019-03-14 PROCEDURE — 93224 XTRNL ECG REC UP TO 48 HRS: CPT | Performed by: INTERNAL MEDICINE

## 2019-03-19 ENCOUNTER — TELEPHONE (OUTPATIENT)
Dept: CARDIOLOGY CLINIC | Age: 69
End: 2019-03-19

## 2019-03-22 DIAGNOSIS — I48.91 ATRIAL FIBRILLATION, UNSPECIFIED TYPE (HCC): ICD-10-CM

## 2019-03-28 ENCOUNTER — OFFICE VISIT (OUTPATIENT)
Dept: CARDIOLOGY CLINIC | Age: 69
End: 2019-03-28
Payer: MEDICARE

## 2019-03-28 VITALS
HEART RATE: 104 BPM | WEIGHT: 190.2 LBS | BODY MASS INDEX: 28.17 KG/M2 | SYSTOLIC BLOOD PRESSURE: 104 MMHG | HEIGHT: 69 IN | DIASTOLIC BLOOD PRESSURE: 72 MMHG

## 2019-03-28 DIAGNOSIS — I48.3 TYPICAL ATRIAL FLUTTER (HCC): ICD-10-CM

## 2019-03-28 DIAGNOSIS — I10 ESSENTIAL HYPERTENSION: Primary | ICD-10-CM

## 2019-03-28 DIAGNOSIS — I48.19 PERSISTENT ATRIAL FIBRILLATION (HCC): ICD-10-CM

## 2019-03-28 PROCEDURE — 93000 ELECTROCARDIOGRAM COMPLETE: CPT | Performed by: NURSE PRACTITIONER

## 2019-03-28 PROCEDURE — G8427 DOCREV CUR MEDS BY ELIG CLIN: HCPCS | Performed by: NURSE PRACTITIONER

## 2019-03-28 PROCEDURE — G8484 FLU IMMUNIZE NO ADMIN: HCPCS | Performed by: NURSE PRACTITIONER

## 2019-03-28 PROCEDURE — G8419 CALC BMI OUT NRM PARAM NOF/U: HCPCS | Performed by: NURSE PRACTITIONER

## 2019-03-28 PROCEDURE — 1036F TOBACCO NON-USER: CPT | Performed by: NURSE PRACTITIONER

## 2019-03-28 PROCEDURE — 99214 OFFICE O/P EST MOD 30 MIN: CPT | Performed by: NURSE PRACTITIONER

## 2019-03-28 PROCEDURE — 4040F PNEUMOC VAC/ADMIN/RCVD: CPT | Performed by: NURSE PRACTITIONER

## 2019-03-28 PROCEDURE — 3017F COLORECTAL CA SCREEN DOC REV: CPT | Performed by: NURSE PRACTITIONER

## 2019-03-28 PROCEDURE — 1123F ACP DISCUSS/DSCN MKR DOCD: CPT | Performed by: NURSE PRACTITIONER

## 2019-03-28 RX ORDER — DIGOXIN 125 MCG
125 TABLET ORAL DAILY
Qty: 30 TABLET | Refills: 5 | Status: SHIPPED | OUTPATIENT
Start: 2019-03-28 | End: 2019-07-02 | Stop reason: ALTCHOICE

## 2019-04-01 ENCOUNTER — TELEPHONE (OUTPATIENT)
Dept: CARDIOLOGY CLINIC | Age: 69
End: 2019-04-01

## 2019-04-01 ENCOUNTER — OFFICE VISIT (OUTPATIENT)
Dept: PULMONOLOGY | Age: 69
End: 2019-04-01
Payer: MEDICARE

## 2019-04-01 ENCOUNTER — TELEPHONE (OUTPATIENT)
Dept: PULMONOLOGY | Age: 69
End: 2019-04-01

## 2019-04-01 VITALS
HEART RATE: 70 BPM | WEIGHT: 189 LBS | TEMPERATURE: 97 F | RESPIRATION RATE: 16 BRPM | SYSTOLIC BLOOD PRESSURE: 125 MMHG | HEIGHT: 69 IN | BODY MASS INDEX: 27.99 KG/M2 | DIASTOLIC BLOOD PRESSURE: 84 MMHG | OXYGEN SATURATION: 97 %

## 2019-04-01 DIAGNOSIS — R09.81 NASAL CONGESTION: ICD-10-CM

## 2019-04-01 DIAGNOSIS — G47.10 HYPERSOMNIA: ICD-10-CM

## 2019-04-01 DIAGNOSIS — R53.83 FATIGUE, UNSPECIFIED TYPE: ICD-10-CM

## 2019-04-01 DIAGNOSIS — I49.8 ATRIAL ARRHYTHMIA: ICD-10-CM

## 2019-04-01 DIAGNOSIS — G47.30 OBSERVED SLEEP APNEA: Primary | ICD-10-CM

## 2019-04-01 DIAGNOSIS — R06.83 SNORING: ICD-10-CM

## 2019-04-01 PROCEDURE — G8427 DOCREV CUR MEDS BY ELIG CLIN: HCPCS | Performed by: INTERNAL MEDICINE

## 2019-04-01 PROCEDURE — 3017F COLORECTAL CA SCREEN DOC REV: CPT | Performed by: INTERNAL MEDICINE

## 2019-04-01 PROCEDURE — G8419 CALC BMI OUT NRM PARAM NOF/U: HCPCS | Performed by: INTERNAL MEDICINE

## 2019-04-01 PROCEDURE — 99205 OFFICE O/P NEW HI 60 MIN: CPT | Performed by: INTERNAL MEDICINE

## 2019-04-01 RX ORDER — FLUTICASONE PROPIONATE 50 MCG
2 SPRAY, SUSPENSION (ML) NASAL DAILY
Qty: 1 BOTTLE | Refills: 6 | Status: SHIPPED | OUTPATIENT
Start: 2019-04-01 | End: 2020-01-07 | Stop reason: ALTCHOICE

## 2019-04-01 ASSESSMENT — SLEEP AND FATIGUE QUESTIONNAIRES
HOW LIKELY ARE YOU TO NOD OFF OR FALL ASLEEP WHILE LYING DOWN TO REST IN THE AFTERNOON WHEN CIRCUMSTANCES PERMIT: 3
HOW LIKELY ARE YOU TO NOD OFF OR FALL ASLEEP WHILE SITTING AND READING: 1
HOW LIKELY ARE YOU TO NOD OFF OR FALL ASLEEP IN A CAR, WHILE STOPPED FOR A FEW MINUTES IN TRAFFIC: 0
HOW LIKELY ARE YOU TO NOD OFF OR FALL ASLEEP WHILE WATCHING TV: 3
HOW LIKELY ARE YOU TO NOD OFF OR FALL ASLEEP WHEN YOU ARE A PASSENGER IN A CAR FOR AN HOUR WITHOUT A BREAK: 0
HOW LIKELY ARE YOU TO NOD OFF OR FALL ASLEEP WHILE SITTING QUIETLY AFTER LUNCH WITHOUT ALCOHOL: 0
NECK CIRCUMFERENCE (INCHES): 18.5
HOW LIKELY ARE YOU TO NOD OFF OR FALL ASLEEP WHILE SITTING INACTIVE IN A PUBLIC PLACE: 0
ESS TOTAL SCORE: 7
HOW LIKELY ARE YOU TO NOD OFF OR FALL ASLEEP WHILE SITTING AND TALKING TO SOMEONE: 0

## 2019-04-01 NOTE — LETTER
[address]  [facility] [place] [address]  Phone: [phone]  Fax: [phone]    April 1, 2019     Patient: Karla Patel   MR Number: [id number]   YOB: 1950   Date of Visit: 4/1/2019     Dear Dr. Laura Garcia: Thank you for the request for consultation for Marcus Hernandez to me for the evaluation. Below are the relevant portions of my assessment and plan of care. Assessment:       · Snoring  · Observed sleep apnea   · Hypersomnia   · Fatigue   · Nasal congestyion and PND   · HTN, DM, and Atrial arrhythmias      Plan:       · PSG evaluate for sleep related breathing disorder. Treatment options were discussed with patient if PSG reveals ALCIDES, including CPAP therapy, oral appliances, upper airway surgery and hypoglossal nerve stimulation. · Discussed with patient the pathophysiology of apnea. · Sleep hygiene  · Trial of Flonase 2 sprays/nostril daily  · Avoid sedatives, alcohol and caffeinated drinks at bed time. · No driving motorized vehicles or operating heavy machinery while fatigue, drowsy or sleepy. · Weight loss is also recommended as a long-term intervention. · Complications of ALCIDES if not treated were discussed with patient patient to include systemic hypertension, pulmonary hypertension, cardiovascular morbidities, car accidents and all cause mortality. If you have questions, please do not hesitate to call me. I look forward to following Justyn Oosrio along with you.     Sincerely,        Lawrence Smith MD

## 2019-04-01 NOTE — COMMUNICATION BODY
Assessment:       · Snoring  · Observed sleep apnea   · Hypersomnia   · Fatigue   · Nasal congestyion and PND   · HTN, DM, and Atrial arrhythmias      Plan:       · PSG evaluate for sleep related breathing disorder. Treatment options were discussed with patient if PSG reveals ALCIDES, including CPAP therapy, oral appliances, upper airway surgery and hypoglossal nerve stimulation. · Discussed with patient the pathophysiology of apnea. · Sleep hygiene  · Trial of Flonase 2 sprays/nostril daily  · Avoid sedatives, alcohol and caffeinated drinks at bed time. · No driving motorized vehicles or operating heavy machinery while fatigue, drowsy or sleepy. · Weight loss is also recommended as a long-term intervention. · Complications of ALCIDES if not treated were discussed with patient patient to include systemic hypertension, pulmonary hypertension, cardiovascular morbidities, car accidents and all cause mortality.

## 2019-04-01 NOTE — TELEPHONE ENCOUNTER
Discussed persistent atrial flutter with Dr. Purnima Michel. MD recommending cardioversion. Patient stated he is still on antibiotics for URI and will call when URI resolves.

## 2019-04-03 ENCOUNTER — TELEPHONE (OUTPATIENT)
Dept: CARDIOLOGY CLINIC | Age: 69
End: 2019-04-03

## 2019-04-03 NOTE — TELEPHONE ENCOUNTER
OK good. He should come in for an EKG this week. Will cancel recommendation for cardioversion if he spontaneously converted to sinus.

## 2019-04-03 NOTE — TELEPHONE ENCOUNTER
Please inform the patient that his EKG looks good. He spontaneously converted to normal rhythm and does not need a cardioversion. He should continue current medications and see me in 3 months.

## 2019-04-03 NOTE — TELEPHONE ENCOUNTER
Pt called and stated that he would like NPBB to know that his most medication change to lower his HR is working he stated that his HR has been between 55-60

## 2019-04-05 LAB
AVERAGE GLUCOSE: NORMAL
HBA1C MFR BLD: 6.2 %

## 2019-04-08 ENCOUNTER — ANTI-COAG VISIT (OUTPATIENT)
Dept: PHARMACY | Age: 69
End: 2019-04-08
Payer: MEDICARE

## 2019-04-08 DIAGNOSIS — Z79.01 LONG TERM CURRENT USE OF ANTICOAGULANT: ICD-10-CM

## 2019-04-08 LAB — INR BLD: 3.9

## 2019-04-08 PROCEDURE — 99211 OFF/OP EST MAY X REQ PHY/QHP: CPT

## 2019-04-08 PROCEDURE — 85610 PROTHROMBIN TIME: CPT

## 2019-04-08 NOTE — PROGRESS NOTES
Mr. Reyes Quan is here for management of anticoagulation for AFib. PMH also significant for HTN, HLD, s/p ablation and DM. He presents today w/out complaint. Pt verified dosing regimen. Pt denies s/s bleeding/bruising/swelling/SOB. No BRBPR. No melena. No missed doses. No changes in Rx/OTC/herbal medications. No major changes in diet. Denies EtOH and tobacco use. INR 3.9 is above acceptable therapeutic range of 2-3 d/t pt being on abx. He states he has 5 more days left. Recommended to hold today then 1/2 TAB Tues , then continue 5 mg daily, except 2.5 mg every Thurs. Pt has 5 mg tabs. Will continue to monitor and check INR in 4 weeks. Dosing reminder card given with phone number, appointment date and time.    Return to clinic: 4/22 at [address] [place] [address]  Referring physician: Dr. Delcia Severin

## 2019-04-22 ENCOUNTER — ANTI-COAG VISIT (OUTPATIENT)
Dept: PHARMACY | Age: 69
End: 2019-04-22
Payer: MEDICARE

## 2019-04-22 DIAGNOSIS — Z79.01 LONG TERM CURRENT USE OF ANTICOAGULANT: ICD-10-CM

## 2019-04-22 LAB — INR BLD: 3.9

## 2019-04-22 PROCEDURE — 99211 OFF/OP EST MAY X REQ PHY/QHP: CPT | Performed by: INTERNAL MEDICINE

## 2019-04-22 PROCEDURE — 85610 PROTHROMBIN TIME: CPT | Performed by: INTERNAL MEDICINE

## 2019-04-22 NOTE — PROGRESS NOTES
Mr. Jaguar Karimi is here for management of anticoagulation for AFib. PMH also significant for HTN, HLD, s/p ablation and DM. He presents today w/out complaint. Pt verified dosing regimen. Pt denies s/s bleeding/bruising/swelling/SOB. No BRBPR. No melena. No missed doses. No changes in Rx/herbal medications. Eating less due to being sick. Denies EtOH and tobacco use. Pt reports still feeling sick, will go get a chest xray soon. Will call if put on more abx. INR 3.9 is above acceptable therapeutic range of 2-3. Abx was finished about 6 days ago, states he has been eating less due to still not feeling well. He has been taking extra tylenol over the weekend due to sinus headaches. Recommended to hold today and eat a salad, then reduce dose to 5 mg daily, except 2.5 mg every Mon/Thurs. Pt has 5 mg tabs. Will continue to monitor and check INR in 2 weeks. Dosing reminder card given with phone number, appointment date and time.    Return to clinic: 5/6 at [address] [place] [address]  Referring physician: Dr. Kevin Mojica

## 2019-04-23 ENCOUNTER — OFFICE VISIT (OUTPATIENT)
Dept: PULMONOLOGY | Age: 69
End: 2019-04-23
Payer: MEDICARE

## 2019-04-23 ENCOUNTER — HOSPITAL ENCOUNTER (OUTPATIENT)
Age: 69
Discharge: HOME | End: 2019-04-23
Payer: MEDICARE

## 2019-04-23 ENCOUNTER — TELEPHONE (OUTPATIENT)
Dept: PULMONOLOGY | Age: 69
End: 2019-04-23

## 2019-04-23 ENCOUNTER — HOSPITAL ENCOUNTER (OUTPATIENT)
Dept: GENERAL RADIOLOGY | Age: 69
Discharge: HOME | End: 2019-04-23
Payer: MEDICARE

## 2019-04-23 VITALS
OXYGEN SATURATION: 97 % | DIASTOLIC BLOOD PRESSURE: 76 MMHG | SYSTOLIC BLOOD PRESSURE: 116 MMHG | TEMPERATURE: 97.6 F | WEIGHT: 189 LBS | RESPIRATION RATE: 16 BRPM | HEART RATE: 59 BPM | HEIGHT: 69 IN | BODY MASS INDEX: 27.99 KG/M2

## 2019-04-23 DIAGNOSIS — G47.30 OBSERVED SLEEP APNEA: ICD-10-CM

## 2019-04-23 DIAGNOSIS — R05.3 CHRONIC COUGH: ICD-10-CM

## 2019-04-23 DIAGNOSIS — I49.8 ATRIAL ARRHYTHMIA: ICD-10-CM

## 2019-04-23 DIAGNOSIS — R91.1 PULMONARY NODULE: ICD-10-CM

## 2019-04-23 DIAGNOSIS — R05.3 CHRONIC COUGH: Primary | ICD-10-CM

## 2019-04-23 DIAGNOSIS — J92.9 PLEURAL THICKENING: Primary | ICD-10-CM

## 2019-04-23 DIAGNOSIS — R09.81 NASAL CONGESTION: ICD-10-CM

## 2019-04-23 DIAGNOSIS — R06.83 SNORING: ICD-10-CM

## 2019-04-23 DIAGNOSIS — G47.10 HYPERSOMNIA: ICD-10-CM

## 2019-04-23 PROCEDURE — 1036F TOBACCO NON-USER: CPT | Performed by: INTERNAL MEDICINE

## 2019-04-23 PROCEDURE — 87070 CULTURE OTHR SPECIMN AEROBIC: CPT

## 2019-04-23 PROCEDURE — 87185 SC STD ENZYME DETCJ PER NZM: CPT

## 2019-04-23 PROCEDURE — 87205 SMEAR GRAM STAIN: CPT

## 2019-04-23 PROCEDURE — 71046 X-RAY EXAM CHEST 2 VIEWS: CPT

## 2019-04-23 PROCEDURE — G8419 CALC BMI OUT NRM PARAM NOF/U: HCPCS | Performed by: INTERNAL MEDICINE

## 2019-04-23 PROCEDURE — 1123F ACP DISCUSS/DSCN MKR DOCD: CPT | Performed by: INTERNAL MEDICINE

## 2019-04-23 PROCEDURE — 4040F PNEUMOC VAC/ADMIN/RCVD: CPT | Performed by: INTERNAL MEDICINE

## 2019-04-23 PROCEDURE — 3017F COLORECTAL CA SCREEN DOC REV: CPT | Performed by: INTERNAL MEDICINE

## 2019-04-23 PROCEDURE — 99214 OFFICE O/P EST MOD 30 MIN: CPT | Performed by: INTERNAL MEDICINE

## 2019-04-23 PROCEDURE — G8427 DOCREV CUR MEDS BY ELIG CLIN: HCPCS | Performed by: INTERNAL MEDICINE

## 2019-04-23 RX ORDER — DOXYCYCLINE HYCLATE 100 MG
100 TABLET ORAL 2 TIMES DAILY
Qty: 16 TABLET | Refills: 0 | Status: SHIPPED | OUTPATIENT
Start: 2019-04-23 | End: 2019-05-01

## 2019-04-23 NOTE — PROGRESS NOTES
[facility]                                                                  CHIEF COMPLAINT: Cough      HPI:   Patient did not get his sleep study- scheduled for 5/2/2019   Cough for 5-6 week, cough with yellow green sputum  No hemoptysis   Completed 2 courses of Abx- last course on Tuesday   Quit smoking 18 years ago, smoked 1/2 ppd for 16 years       From prior visit:  Snoring at night for the past >5 years. The severity of snoring is severe. Worse in supine position and better on the side. Wakes self snoring. Has observed sleep apnea. Wakes up at night choking and gasping for air. + dry mouth upon awakening. Patient is complaining of daytime sleepiness. Nasal congestion with PND. Fatigue and tiredness during the day. Bedtime 9 pm and rise time is 5 am. Sleep onset 30 minutes. 2 nocturia. 2 nap during the day for 40 min. No car wrecks or near wrecks because of the sleepiness. No nodding off while driving. Drinks 1 caffinated beverages per day. ESS is 7    Past Medical History:   Diagnosis Date    Allergic rhinitis     Atrial fibrillation (HCC)     Cancer (HCC)     Basal Cell cancer on right forehead    Chronic LBP     Diabetes mellitus ([facility] [address]     Non-insulin dependent    Erectile dysfunction 4/6/2012    History of blood transfusion     Hypercholesteremia     Hypertension     Kidney stone on left side 5/2/2012    Type II or unspecified type diabetes mellitus without mention of complication, not stated as uncontrolled     Wears glasses        Past Surgical History:        Procedure Laterality Date    APPENDECTOMY Right 01/01/1955    CARDIAC SURGERY      COLONOSCOPY      SKIN BIOPSY Right 01/01/2007    forehead- cancer       Allergies:  has No Known Allergies. Social History:    TOBACCO:   reports that he quit smoking about 14 years ago. His smoking use included cigarettes. He has a 28.00 pack-year smoking history.  He has never used smokeless tobacco.  ETOH:   reports that he drinks about 0.6 oz of alcohol per week. Family History:       Problem Relation Age of Onset    High Blood Pressure Mother     Stroke Mother     Diabetes Father     Heart Disease Brother        Current Medications:    Current Outpatient Medications:     fluticasone (FLONASE) 50 MCG/ACT nasal spray, 2 sprays by Nasal route daily, Disp: 1 Bottle, Rfl: 6    digoxin (LANOXIN) 125 MCG tablet, Take 1 tablet by mouth daily, Disp: 30 tablet, Rfl: 5    metFORMIN (GLUCOPHAGE) 1000 MG tablet, TAKE 1 TABLET TWICE DAILY WITH MEALS, Disp: 180 tablet, Rfl: 3    flecainide (TAMBOCOR) 100 MG tablet, Take 1 tablet by mouth daily, Disp: 180 tablet, Rfl: 1    metoprolol succinate (TOPROL XL) 100 MG extended release tablet, Take 1 tablet by mouth daily, Disp: 90 tablet, Rfl: 3    warfarin (COUMADIN) 5 MG tablet, TAKE 1 TABLET ONE TIME DAILY  OR AS DIRECTED  BY  CLINIC, Disp: 90 tablet, Rfl: 1    HYDROcodone-acetaminophen (NORCO) 7.5-325 MG per tablet, TAKE ONE TABLET BY MOUTH EVERY 4-6 HOURS AS NEEDED FOR PAIN. MAXIMUM 5 per day. ., Disp: 150 tablet, Rfl: 0    fenofibrate 160 MG tablet, TAKE 1 TABLET ONE TIME DAILY, Disp: 90 tablet, Rfl: 3    losartan-hydrochlorothiazide (HYZAAR) 100-12.5 MG per tablet, TAKE 1 TABLET EVERY DAY, Disp: 90 tablet, Rfl: 3    atorvastatin (LIPITOR) 40 MG tablet, TAKE 1 TABLET BY MOUTH ONE TIME A DAY, Disp: 90 tablet, Rfl: 3    Multiple Vitamins-Minerals (THERAPEUTIC MULTIVITAMIN-MINERALS) tablet, Take 1 tablet by mouth daily. , Disp: , Rfl:     Cholecalciferol (VITAMIN D) 2000 UNITS CAPS capsule, Take  by mouth daily. , Disp: , Rfl:     vitamin B-12 (CYANOCOBALAMIN) 1000 MCG tablet, Take 1,200 mcg by mouth daily. , Disp: , Rfl:       Objective:   PHYSICAL EXAM:    Pulse 59, resp. rate 16, height 5' 9\" (1.753 m), weight 189 lb (85.7 kg), SpO2 97 %.' on RA  Gen: No distress. Obese. BMI of 27.91  Eyes: PERRL. No sclera icterus.  No conjunctival injection. ENT: No discharge. Pharynx clear. Mallampati class IV. Neck: Trachea midline. No obvious mass. Neck circumference 18.5\"  Resp: No accessory muscle use. L crackles. No wheezes. No rhonchi. No dullness on percussion. Good air entry. CV: Regular rate. Irregular rhythm. No murmur or rub. No edema. GI: Non-tender. Non-distended. No hernia. Skin: Warm and dry. No nodule on exposed extremities. Lymph: No cervical LAD. No supraclavicular LAD. M/S: No cyanosis. No joint deformity. No clubbing. Neuro: Awake. Alert. Moves all four extremities. Psych: Oriented x 3. No anxiety. DATA reviewed by me:   CXR 2/14/2018  images reviewed by me and showed:   no acute cardiopulmonary disease     RITU 2/28/19   Ejection fraction is visually estimated to be 55-60 %.   Mild mitral and tricuspid regurgitation.  Danie Coffer is no evidence of mass or thrombus in the left atrium or appendage.       Assessment:       · Chronic productive cough. Completed 2 course of antibiotics. Rule out MDR pathogen, bronchiectasis and parenchymal disease   · Snoring, Observed sleep apnea, and Hypersomnia   · Nasal congestyion and PND   · HTN, DM, and Atrial arrhythmias- on Coumadin   · Quit smoking 18 years ago, smoked 1/2 ppd for 16 years         Plan:       · Doxycycline 100 mg PO BID for 8 days. · Sputum Cx   · CXR and lateral   · PSG evaluate for sleep related breathing disorder.

## 2019-04-25 LAB
CULTURE, RESPIRATORY: ABNORMAL
CULTURE, RESPIRATORY: ABNORMAL
GRAM STAIN RESULT: ABNORMAL
ORGANISM: ABNORMAL

## 2019-05-01 ENCOUNTER — HOSPITAL ENCOUNTER (OUTPATIENT)
Dept: SLEEP CENTER | Age: 69
Discharge: HOME | End: 2019-05-03
Payer: MEDICARE

## 2019-05-01 DIAGNOSIS — R06.83 SNORING: ICD-10-CM

## 2019-05-01 DIAGNOSIS — I49.8 ATRIAL ARRHYTHMIA: ICD-10-CM

## 2019-05-01 DIAGNOSIS — G47.30 OBSERVED SLEEP APNEA: ICD-10-CM

## 2019-05-01 DIAGNOSIS — G47.10 HYPERSOMNIA: ICD-10-CM

## 2019-05-01 DIAGNOSIS — R09.81 NASAL CONGESTION: ICD-10-CM

## 2019-05-01 DIAGNOSIS — R53.83 FATIGUE, UNSPECIFIED TYPE: ICD-10-CM

## 2019-05-01 PROCEDURE — 95810 POLYSOM 6/> YRS 4/> PARAM: CPT

## 2019-05-02 ENCOUNTER — TELEPHONE (OUTPATIENT)
Dept: PULMONOLOGY | Age: 69
End: 2019-05-02

## 2019-05-02 DIAGNOSIS — G47.33 OSA (OBSTRUCTIVE SLEEP APNEA): Primary | ICD-10-CM

## 2019-05-06 ENCOUNTER — ANTI-COAG VISIT (OUTPATIENT)
Dept: PHARMACY | Age: 69
End: 2019-05-06
Payer: MEDICARE

## 2019-05-06 DIAGNOSIS — Z79.01 LONG TERM CURRENT USE OF ANTICOAGULANT: ICD-10-CM

## 2019-05-06 LAB — INR BLD: 3.1

## 2019-05-06 PROCEDURE — 85610 PROTHROMBIN TIME: CPT | Performed by: INTERNAL MEDICINE

## 2019-05-06 PROCEDURE — 99211 OFF/OP EST MAY X REQ PHY/QHP: CPT | Performed by: INTERNAL MEDICINE

## 2019-05-06 NOTE — PROGRESS NOTES
Mr. Zohaib Castle is here for management of anticoagulation for AFib. PMH also significant for HTN, HLD, s/p ablation and DM. He presents today w/out complaint. Pt verified dosing regimen. Pt denies s/s bleeding/bruising/swelling/SOB. No BRBPR. No melena. No missed doses. No changes in herbal medications. Cardiologist had put him on digoxin. Still taking some tylenol for headaches. He was put on doxycyline for 8 days, finished about 5 days ago. Eating has improved some. Denies EtOH and tobacco use. Pt reports still feeling sick, will go get a CAT scan on 5/10 since he still has a cough, and they found \"a blockage\" in his left lung. INR 3.1 is above acceptable therapeutic range of 2-3. Abx was finished about 5 days ago, states he has been eating a little better, including some vit k. Recommended to reduce dose to 5 mg daily, except 2.5 mg every Mon/Wed/Fri. Pt has 5 mg tabs. Will continue to monitor and check INR in 2-3 weeks. Dosing reminder card given with phone number, appointment date and time.    Return to clinic: 5/28 at [address] [place] [address]  Referring physician: Dr. Brenna Bell

## 2019-05-09 ENCOUNTER — TELEPHONE (OUTPATIENT)
Dept: PULMONOLOGY | Age: 69
End: 2019-05-09

## 2019-05-16 ENCOUNTER — HOSPITAL ENCOUNTER (OUTPATIENT)
Dept: SLEEP CENTER | Age: 69
Discharge: HOME | End: 2019-05-18
Payer: MEDICARE

## 2019-05-16 DIAGNOSIS — G47.33 OSA (OBSTRUCTIVE SLEEP APNEA): ICD-10-CM

## 2019-05-16 PROCEDURE — 95811 POLYSOM 6/>YRS CPAP 4/> PARM: CPT

## 2019-05-22 DIAGNOSIS — G47.33 OSA (OBSTRUCTIVE SLEEP APNEA): Primary | ICD-10-CM

## 2019-05-28 ENCOUNTER — ANTI-COAG VISIT (OUTPATIENT)
Dept: PHARMACY | Age: 69
End: 2019-05-28
Payer: MEDICARE

## 2019-05-28 DIAGNOSIS — Z79.01 LONG TERM CURRENT USE OF ANTICOAGULANT: ICD-10-CM

## 2019-05-28 LAB — INR BLD: 3.2

## 2019-05-28 PROCEDURE — 99211 OFF/OP EST MAY X REQ PHY/QHP: CPT

## 2019-05-28 PROCEDURE — 85610 PROTHROMBIN TIME: CPT

## 2019-05-28 NOTE — PROGRESS NOTES
MrAnna Meek is here for management of anticoagulation for AFib. PMH also significant for HTN, HLD, s/p ablation and DM. He presents today w/out complaint. Pt verified dosing regimen. Pt denies s/s bleeding/bruising/swelling/SOB. No BRBPR. No melena. No missed doses. No changes in herbal medications. Cardiologist had put him on digoxin. Still taking some tylenol for headaches. He was put on doxycyline for 8 days, finished about 5 days ago. Eating has improved some. Denies EtOH and tobacco use. Pt reports still feeling sick, will go get a CAT scan on 5/10 since he still has a cough, and they found \"a blockage\" in his left lung. INR 3.2 is slightl above acceptable therapeutic range of 2-3. Pt had extra etoh drinks yesterday. Recommended to hold today then continue 5 mg daily, except 2.5 mg every Mon/Wed/Fri. Pt has 5 mg tabs. Will continue to monitor and check INR in 4 weeks. Dosing reminder card given with phone number, appointment date and time.    Return to clinic: 6/24 at [address] [place] [address]  Referring physician: Dr. Mohsen Farfan

## 2019-06-11 RX ORDER — WARFARIN SODIUM 5 MG/1
TABLET ORAL
Qty: 90 TABLET | Refills: 1 | Status: SHIPPED | OUTPATIENT
Start: 2019-06-11 | End: 2019-10-28 | Stop reason: SDUPTHER

## 2019-06-28 ENCOUNTER — ANTI-COAG VISIT (OUTPATIENT)
Dept: PHARMACY | Age: 69
End: 2019-06-28
Payer: MEDICARE

## 2019-06-28 DIAGNOSIS — Z79.01 LONG TERM CURRENT USE OF ANTICOAGULANT: ICD-10-CM

## 2019-06-28 LAB — INTERNATIONAL NORMALIZATION RATIO, POC: 2.3

## 2019-06-28 PROCEDURE — 85610 PROTHROMBIN TIME: CPT | Performed by: PHARMACIST

## 2019-06-28 PROCEDURE — 99211 OFF/OP EST MAY X REQ PHY/QHP: CPT | Performed by: PHARMACIST

## 2019-06-28 NOTE — PROGRESS NOTES
Mr. Atiya Bonilla is here for management of anticoagulation for AFib. PMH also significant for HTN, HLD, s/p ablation and DM. He presents today w/out complaint. Pt verified dosing regimen. Pt denies s/s bleeding/bruising/swelling/SOB. No BRBPR. No melena. No missed doses. No changes in herbal medications. Cardiologist had put him on digoxin. Still taking some tylenol for headaches. He was put on doxycyline for 8 days, finished about 5 days ago. Eating has improved some. Denies EtOH and tobacco use. INR 2.3 is within acceptable therapeutic range of 2-3. Recommended to continue 5 mg daily, except 2.5 mg every Mon/Wed/Fri. Pt has 5 mg tabs. Will continue to monitor and check INR in 4 weeks. Dosing reminder card given with phone number, appointment date and time.    Return to clinic: 7/26 at [address] [place] [address]  Referring physician: Dr. Finn Wong, PharmD 7:02 AM 6/28/19

## 2019-07-02 ENCOUNTER — OFFICE VISIT (OUTPATIENT)
Dept: CARDIOLOGY CLINIC | Age: 69
End: 2019-07-02
Payer: MEDICARE

## 2019-07-02 VITALS
HEART RATE: 53 BPM | DIASTOLIC BLOOD PRESSURE: 78 MMHG | HEIGHT: 69 IN | SYSTOLIC BLOOD PRESSURE: 110 MMHG | BODY MASS INDEX: 27.28 KG/M2 | WEIGHT: 184.2 LBS | OXYGEN SATURATION: 98 %

## 2019-07-02 DIAGNOSIS — I44.0 FIRST DEGREE AV BLOCK: ICD-10-CM

## 2019-07-02 DIAGNOSIS — I10 ESSENTIAL HYPERTENSION: ICD-10-CM

## 2019-07-02 DIAGNOSIS — I48.0 PAROXYSMAL ATRIAL FIBRILLATION (HCC): ICD-10-CM

## 2019-07-02 DIAGNOSIS — I48.3 TYPICAL ATRIAL FLUTTER (HCC): Primary | ICD-10-CM

## 2019-07-02 PROCEDURE — 4040F PNEUMOC VAC/ADMIN/RCVD: CPT | Performed by: NURSE PRACTITIONER

## 2019-07-02 PROCEDURE — G8419 CALC BMI OUT NRM PARAM NOF/U: HCPCS | Performed by: NURSE PRACTITIONER

## 2019-07-02 PROCEDURE — G8427 DOCREV CUR MEDS BY ELIG CLIN: HCPCS | Performed by: NURSE PRACTITIONER

## 2019-07-02 PROCEDURE — 93000 ELECTROCARDIOGRAM COMPLETE: CPT | Performed by: NURSE PRACTITIONER

## 2019-07-02 PROCEDURE — 1123F ACP DISCUSS/DSCN MKR DOCD: CPT | Performed by: NURSE PRACTITIONER

## 2019-07-02 PROCEDURE — 1036F TOBACCO NON-USER: CPT | Performed by: NURSE PRACTITIONER

## 2019-07-02 PROCEDURE — 99214 OFFICE O/P EST MOD 30 MIN: CPT | Performed by: NURSE PRACTITIONER

## 2019-07-02 PROCEDURE — 3017F COLORECTAL CA SCREEN DOC REV: CPT | Performed by: NURSE PRACTITIONER

## 2019-07-02 NOTE — PROGRESS NOTES
[address]   Electrophysiology Note              Date:  July 2, 2019  Patient name: Antonio Interiano  YOB: 1950    Primary Care physician: Pasquale Clemens MD    HISTORY OF PRESENT ILLNESS: The patient is a 68 y.o.  male with a history of paroxysmal atrial fibrillation, atrial flutter, HTN, DM, ED, and HLD. He was started on flecainide in 2014 and no known recurrence of AF until 11/2018. Echo in 1/2019 showed an EF of 55%. In 2/2019 he had RFCA of PAF using WACA technique. An outpatient sleep study was recommended. In 3/2019 he reported his HR was fast. A 24 hour Holter showed atrial flutter. He remained in atrial flutter with elevated rates at his visit in 3/2019 and was started on digoxin. He came in for an EKG 1 week later and had spontaneously converted to sinus. In 5/2019 he was diagnosed with moderate ALCIDES. Today he is being seen for atrial fibrillation and atrial flutter. EKG shows sinus sylvia with a 1st degree AVB with a HR of 55. He is feeling well and denies chest pain, palpitations, shortness of breath, and dizziness. Has been using CPAP, has more energy. Past Medical History:   has a past medical history of Allergic rhinitis, Atrial fibrillation ([address] Cancer ([address] Chronic LBP, Diabetes mellitus ([address] Erectile dysfunction, History of blood transfusion, Hypercholesteremia, Hypertension, Kidney stone on left side, Type II or unspecified type diabetes mellitus without mention of complication, not stated as uncontrolled, and Wears glasses. Past Surgical History:   has a past surgical history that includes Appendectomy (Right, 01/01/1955); skin biopsy (Right, 01/01/2007); Colonoscopy; and Cardiac surgery. Home Medications:    Prior to Admission medications    Medication Sig Start Date End Date Taking?  Authorizing Provider   warfarin (COUMADIN) 5 MG tablet TAKE 1 TABLET ONE TIME DAILY  OR AS DIRECTED  BY  CLINIC 6/11/19  Yes KESHIA Hale - CHELE   digoxin regurgitation.   Systolic pulmonary artery pressure (SPAP) is normal and estimated at 32 mmHg   (right atrial pressure 3 mmHg). Echo 11/12/2012:  1. Technically difficult study. Left ventricular systolic function  appears preserved. There is probable left ventricular hypertrophy. 2. Left atrium is enlarged. GXT 12/12/2011:  CONCLUSIONS-  1. Mild fitness impairment for age. 2. Nondiagnostic study due to inadequate cardiac stress but no  diagnostic abnormalities at a heart rate of 116. CARDIOLOGY LABS:   CBC: No results for input(s): WBC, HGB, HCT, PLT in the last 72 hours. BMP: No results for input(s): NA, K, CO2, BUN, CREATININE, LABGLOM, GLUCOSE in the last 72 hours. PT/INR: No results for input(s): PROTIME, INR in the last 72 hours. APTT:No results for input(s): APTT in the last 72 hours. FASTING LIPID PANEL:  Lab Results   Component Value Date    HDL 36 01/04/2017    HDL 43 04/06/2012    LDLDIRECT 146 07/05/2016    LDLCALC 87 01/04/2017    TRIG 281 01/04/2017     LIVER PROFILE:No results for input(s): AST, ALT, ALB in the last 72 hours. Assessment:  1. Paroxysmal (formerly persistent) atrial arrhythmias (AF and AFL): stable   -s/p RFCA of PAF using WACA technique on 2/28/2019   -24 hour Holter in 3/2019 showed atrial flutter   -[id number] score 3 (age, HTN, DM)  2. First degree AV block: stable  2. HTN: controlled   3. DM: PCP managing  4. ALCIDES: moderate    Plan:  1. Continue flecainide, Toprol, losartan, HCTZ, and Coumadin (Clinic managing)   2. Discontinue digoxin due to bradycardia  3. Recommend EP study and possible RFCA if atrial flutter is recurrent (with treatment of ALCIDES, hoping atrial arrhythmias are not recurrent)  4. Annual BMP and CBC (due 2/2020)  5.  Follow up in 6 months     Teddy Joshi, [address]  [phone]

## 2019-07-25 ENCOUNTER — HOSPITAL ENCOUNTER (EMERGENCY)
Age: 69
Discharge: LEFT BEFORE DISCHARGE | End: 2019-07-26
Attending: EMERGENCY MEDICINE
Payer: MEDICARE

## 2019-07-25 ENCOUNTER — APPOINTMENT (OUTPATIENT)
Dept: CT IMAGING | Age: 69
End: 2019-07-25
Payer: MEDICARE

## 2019-07-25 DIAGNOSIS — I65.23 STENOSIS OF BOTH INTERNAL CAROTID ARTERIES: ICD-10-CM

## 2019-07-25 DIAGNOSIS — E04.1 THYROID NODULE: ICD-10-CM

## 2019-07-25 DIAGNOSIS — H81.10 BENIGN PAROXYSMAL POSITIONAL VERTIGO, UNSPECIFIED LATERALITY: Primary | ICD-10-CM

## 2019-07-25 LAB
A/G RATIO: 1.3 (ref 1.1–2.2)
ALBUMIN SERPL-MCNC: 4.3 G/DL (ref 3.4–5)
ALP BLD-CCNC: 27 U/L (ref 40–129)
ALT SERPL-CCNC: 32 U/L (ref 10–40)
ANION GAP SERPL CALCULATED.3IONS-SCNC: 13 MMOL/L (ref 3–16)
AST SERPL-CCNC: 40 U/L (ref 15–37)
BASOPHILS ABSOLUTE: 0 K/UL (ref 0–0.2)
BASOPHILS RELATIVE PERCENT: 0.5 %
BILIRUB SERPL-MCNC: 0.9 MG/DL (ref 0–1)
BUN BLDV-MCNC: 19 MG/DL (ref 7–20)
CALCIUM SERPL-MCNC: 10.3 MG/DL (ref 8.3–10.6)
CHLORIDE BLD-SCNC: 95 MMOL/L (ref 99–110)
CO2: 25 MMOL/L (ref 21–32)
CREAT SERPL-MCNC: 1 MG/DL (ref 0.8–1.3)
EOSINOPHILS ABSOLUTE: 0.2 K/UL (ref 0–0.6)
EOSINOPHILS RELATIVE PERCENT: 2.8 %
GFR AFRICAN AMERICAN: >60
GFR NON-AFRICAN AMERICAN: >60
GLOBULIN: 3.2 G/DL
GLUCOSE BLD-MCNC: 139 MG/DL (ref 70–99)
HCT VFR BLD CALC: 44.2 % (ref 40.5–52.5)
HEMOGLOBIN: 14.9 G/DL (ref 13.5–17.5)
INR BLD: 2.65 (ref 0.86–1.14)
LACTIC ACID: 1.4 MMOL/L (ref 0.4–2)
LYMPHOCYTES ABSOLUTE: 1.7 K/UL (ref 1–5.1)
LYMPHOCYTES RELATIVE PERCENT: 19.7 %
MCH RBC QN AUTO: 30.3 PG (ref 26–34)
MCHC RBC AUTO-ENTMCNC: 33.6 G/DL (ref 31–36)
MCV RBC AUTO: 90 FL (ref 80–100)
MONOCYTES ABSOLUTE: 0.6 K/UL (ref 0–1.3)
MONOCYTES RELATIVE PERCENT: 6.9 %
NEUTROPHILS ABSOLUTE: 6 K/UL (ref 1.7–7.7)
NEUTROPHILS RELATIVE PERCENT: 70.1 %
PDW BLD-RTO: 14 % (ref 12.4–15.4)
PLATELET # BLD: 235 K/UL (ref 135–450)
PMV BLD AUTO: 8.7 FL (ref 5–10.5)
POTASSIUM SERPL-SCNC: 5.1 MMOL/L (ref 3.5–5.1)
PROTHROMBIN TIME: 30.2 SEC (ref 9.8–13)
RBC # BLD: 4.91 M/UL (ref 4.2–5.9)
SODIUM BLD-SCNC: 133 MMOL/L (ref 136–145)
TOTAL PROTEIN: 7.5 G/DL (ref 6.4–8.2)
TROPONIN: <0.01 NG/ML
WBC # BLD: 8.6 K/UL (ref 4–11)

## 2019-07-25 PROCEDURE — 2580000003 HC RX 258: Performed by: EMERGENCY MEDICINE

## 2019-07-25 PROCEDURE — 85610 PROTHROMBIN TIME: CPT

## 2019-07-25 PROCEDURE — 93005 ELECTROCARDIOGRAM TRACING: CPT | Performed by: EMERGENCY MEDICINE

## 2019-07-25 PROCEDURE — 85025 COMPLETE CBC W/AUTO DIFF WBC: CPT

## 2019-07-25 PROCEDURE — 84443 ASSAY THYROID STIM HORMONE: CPT

## 2019-07-25 PROCEDURE — 99284 EMERGENCY DEPT VISIT MOD MDM: CPT

## 2019-07-25 PROCEDURE — 36415 COLL VENOUS BLD VENIPUNCTURE: CPT

## 2019-07-25 PROCEDURE — 80053 COMPREHEN METABOLIC PANEL: CPT

## 2019-07-25 PROCEDURE — 70498 CT ANGIOGRAPHY NECK: CPT

## 2019-07-25 PROCEDURE — 83605 ASSAY OF LACTIC ACID: CPT

## 2019-07-25 PROCEDURE — 70450 CT HEAD/BRAIN W/O DYE: CPT

## 2019-07-25 PROCEDURE — 84484 ASSAY OF TROPONIN QUANT: CPT

## 2019-07-25 PROCEDURE — 6360000004 HC RX CONTRAST MEDICATION: Performed by: EMERGENCY MEDICINE

## 2019-07-25 RX ORDER — MECLIZINE HYDROCHLORIDE 25 MG/1
25 TABLET ORAL 4 TIMES DAILY PRN
Qty: 30 TABLET | Refills: 0 | Status: SHIPPED | OUTPATIENT
Start: 2019-07-25 | End: 2019-08-01 | Stop reason: ALTCHOICE

## 2019-07-25 RX ORDER — 0.9 % SODIUM CHLORIDE 0.9 %
1000 INTRAVENOUS SOLUTION INTRAVENOUS ONCE
Status: COMPLETED | OUTPATIENT
Start: 2019-07-25 | End: 2019-07-25

## 2019-07-25 RX ADMIN — SODIUM CHLORIDE 1000 ML: 9 INJECTION, SOLUTION INTRAVENOUS at 21:07

## 2019-07-25 RX ADMIN — IOPAMIDOL 75 ML: 755 INJECTION, SOLUTION INTRAVENOUS at 22:10

## 2019-07-25 ASSESSMENT — ENCOUNTER SYMPTOMS
CHOKING: 0
VOMITING: 0
CHEST TIGHTNESS: 0
EYES NEGATIVE: 1
COUGH: 0
RESPIRATORY NEGATIVE: 1
NAUSEA: 1

## 2019-07-26 ENCOUNTER — ANTI-COAG VISIT (OUTPATIENT)
Dept: PHARMACY | Age: 69
End: 2019-07-26
Payer: MEDICARE

## 2019-07-26 VITALS
RESPIRATION RATE: 16 BRPM | HEART RATE: 61 BPM | TEMPERATURE: 96.6 F | HEIGHT: 69 IN | SYSTOLIC BLOOD PRESSURE: 127 MMHG | BODY MASS INDEX: 25.77 KG/M2 | WEIGHT: 174 LBS | OXYGEN SATURATION: 96 % | DIASTOLIC BLOOD PRESSURE: 70 MMHG

## 2019-07-26 DIAGNOSIS — Z79.01 LONG TERM CURRENT USE OF ANTICOAGULANT: ICD-10-CM

## 2019-07-26 LAB
EKG ATRIAL RATE: 50 BPM
EKG DIAGNOSIS: NORMAL
EKG P AXIS: 63 DEGREES
EKG P-R INTERVAL: 212 MS
EKG Q-T INTERVAL: 512 MS
EKG QRS DURATION: 114 MS
EKG QTC CALCULATION (BAZETT): 466 MS
EKG R AXIS: 8 DEGREES
EKG T AXIS: 45 DEGREES
EKG VENTRICULAR RATE: 50 BPM
INR BLD: 2.3
TSH REFLEX: 1.94 UIU/ML (ref 0.27–4.2)

## 2019-07-26 PROCEDURE — 85610 PROTHROMBIN TIME: CPT

## 2019-07-26 PROCEDURE — 99211 OFF/OP EST MAY X REQ PHY/QHP: CPT

## 2019-07-26 PROCEDURE — 93010 ELECTROCARDIOGRAM REPORT: CPT | Performed by: INTERNAL MEDICINE

## 2019-07-26 NOTE — PROGRESS NOTES
Mr. Freeman Powers is here for management of anticoagulation for AFib. PMH also significant for HTN, HLD, s/p ablation and DM. He presents today w/out complaint. Pt verified dosing regimen. Pt denies s/s bleeding/bruising/swelling/SOB. No BRBPR. No melena. No missed doses. No changes in herbal medications. Cardiologist had put him on digoxin. Still taking some tylenol for headaches. He was put on doxycyline for 8 days, finished about 5 days ago. Eating has improved some. Denies EtOH and tobacco use. INR 2.3 is within acceptable therapeutic range of 2-3. Recommended to continue 5 mg daily, except 2.5 mg every Mon/Wed/Fri. Pt has 5 mg tabs. Will continue to monitor and check INR in 4 weeks. Dosing reminder card given with phone number, appointment date and time.    Return to clinic: 8/23 at [address] [place] [address]  Referring physician: Dr. Ariane Kaur

## 2019-07-26 NOTE — ED NOTES
ANA [email or web address] per Satnam  RE: dizzy, stenotic ICAs, EKG changes  [email or web address]     Yamil Wright  07/25/19 2497

## 2019-07-26 NOTE — ED NOTES
Pt to er with dizziness and one bout of emesis today. . Pt with history of Afib and an ablation. .pt does get vertigo and took a meclozine without relief. . Pt arrived diaphoretic. . Lungs were clear bowel sounds x4     Segundo Pate RN  07/25/19 2004

## 2019-07-26 NOTE — ED PROVIDER NOTES
[facility]  ED  eMERGENCY dEPARTMENT eNCOUnter      Pt Name: Sara Louis  MRN: [id number]  [place] 1950  Date of evaluation: 7/25/2019  Provider: Bess Walton MD    [address] [place] [address]       Chief Complaint   Patient presents with    Dizziness     feeling \"off balance\" since 1630 while lying down watching tv.   pt cool, clammy, diaphoretic. HISTORY OF PRESENT ILLNESS   (Location/Symptom, Timing/Onset,Context/Setting, Quality, Duration, Modifying Factors, Severity)  Note limiting factors. HPI: Sara Louis is a 68 y.o. male, with hx of Afib with digoxin stopped in the last month, currently on flecainide, and metoprolol increased after failed ablation, DM, HTN and HDL, who presents to the emergency department with concerns for dizziness. Describes symptoms as \"feeling unsteady\". Patient notes he got up she is the bathroom and felt like he was unable to walk properly. His daughter to help stabilize him. Daughter also gave him some meclizine which initially did not feel like it was effective, patient is feeling better at this time. Daughter was concerned the patient was feeling dizziness while still. Patient denies any new tinnitus or ear pain. No change to his hearing. Patient denies any recent change to his diet, supplements, or medications other than stopping the digoxin and increasing the metoprolol per above. NursingNotes were reviewed. REVIEW OF SYSTEMS    (2-9 systems for level 4, 10 or more for level 5)     Review of Systems   Constitutional: Positive for fatigue. Negative for chills. HENT: Negative. Negative for ear discharge and ear pain. Eyes: Negative. Respiratory: Negative. Negative for cough, choking and chest tightness. Cardiovascular: Negative. Gastrointestinal: Positive for nausea. Negative for vomiting. Genitourinary: Negative. Musculoskeletal: Negative. Skin: Negative. Neurological: Positive for light-headedness.  Negative for 0.86 - 1.14   EKG 12 Lead   Result Value Ref Range    Ventricular Rate 50 BPM    Atrial Rate 50 BPM    P-R Interval 212 ms    QRS Duration 114 ms    Q-T Interval 512 ms    QTc Calculation (Bazett) 466 ms    P Axis 63 degrees    R Axis 8 degrees    T Axis 45 degrees    Diagnosis       Sinus bradycardia with 1st degree A-V blockLow voltage QRSPossible Inferior infarct (cited on or before 28-FEB-2019)Abnormal ECGWhen compared with ECG of 28-FEB-2019 06:48,Sinus rhythm has replaced Atrial fibrillationVent. rate has decreased BY  87 BPMST less depressed in Anterior leadsT wave inversion no longer evident in Lateral leads     Ct Head Wo Contrast    Result Date: 7/25/2019  EXAMINATION: CT OF THE HEAD WITHOUT CONTRAST  7/25/2019 9:48 pm TECHNIQUE: CT of the head was performed without the administration of intravenous contrast. Dose modulation, iterative reconstruction, and/or weight based adjustment of the mA/kV was utilized to reduce the radiation dose to as low as reasonably achievable. COMPARISON: Head CT dated 01/16/2015. HISTORY: ORDERING SYSTEM PROVIDED HISTORY: dizziness TECHNOLOGIST PROVIDED HISTORY: Has a \"code stroke\" or \"stroke alert\" been called? ->No Reason for Exam: dizziness since 4:30p, no headache, hx of vertigo Acuity: Acute Type of Exam: Initial Relevant Medical/Surgical History: no hx of stroke or seizures FINDINGS: BRAIN/VENTRICLES: There is no acute intracranial hemorrhage, mass effect or midline shift. No abnormal extra-axial fluid collection. The gray-white differentiation is maintained without evidence of an acute infarct. There is no evidence of hydrocephalus. Chronic microvascular ischemic changes in bilateral periventricular and subcortical white matter distribution similar to prior exam. ORBITS: The visualized portion of the orbits demonstrate no acute abnormality. SINUSES: The visualized paranasal sinuses and mastoid air cells demonstrate no acute abnormality.  SOFT TISSUES/SKULL:  No acute

## 2019-07-30 ENCOUNTER — TELEPHONE (OUTPATIENT)
Dept: CARDIOLOGY CLINIC | Age: 69
End: 2019-07-30

## 2019-08-01 ENCOUNTER — OFFICE VISIT (OUTPATIENT)
Dept: PULMONOLOGY | Age: 69
End: 2019-08-01
Payer: MEDICARE

## 2019-08-01 VITALS
WEIGHT: 175 LBS | RESPIRATION RATE: 18 BRPM | DIASTOLIC BLOOD PRESSURE: 60 MMHG | SYSTOLIC BLOOD PRESSURE: 98 MMHG | OXYGEN SATURATION: 100 % | HEIGHT: 66 IN | BODY MASS INDEX: 28.12 KG/M2 | HEART RATE: 56 BPM

## 2019-08-01 DIAGNOSIS — R93.89 ABNORMAL CXR: ICD-10-CM

## 2019-08-01 DIAGNOSIS — R09.82 PND (POST-NASAL DRIP): ICD-10-CM

## 2019-08-01 DIAGNOSIS — G47.33 MODERATE OBSTRUCTIVE SLEEP APNEA: Primary | ICD-10-CM

## 2019-08-01 DIAGNOSIS — R05.3 CHRONIC COUGH: ICD-10-CM

## 2019-08-01 DIAGNOSIS — R09.81 NASAL CONGESTION: ICD-10-CM

## 2019-08-01 PROCEDURE — G8598 ASA/ANTIPLAT THER USED: HCPCS | Performed by: INTERNAL MEDICINE

## 2019-08-01 PROCEDURE — 1036F TOBACCO NON-USER: CPT | Performed by: INTERNAL MEDICINE

## 2019-08-01 PROCEDURE — 99215 OFFICE O/P EST HI 40 MIN: CPT | Performed by: INTERNAL MEDICINE

## 2019-08-01 PROCEDURE — G8419 CALC BMI OUT NRM PARAM NOF/U: HCPCS | Performed by: INTERNAL MEDICINE

## 2019-08-01 PROCEDURE — 1123F ACP DISCUSS/DSCN MKR DOCD: CPT | Performed by: INTERNAL MEDICINE

## 2019-08-01 PROCEDURE — 4040F PNEUMOC VAC/ADMIN/RCVD: CPT | Performed by: INTERNAL MEDICINE

## 2019-08-01 PROCEDURE — 3017F COLORECTAL CA SCREEN DOC REV: CPT | Performed by: INTERNAL MEDICINE

## 2019-08-01 PROCEDURE — G8427 DOCREV CUR MEDS BY ELIG CLIN: HCPCS | Performed by: INTERNAL MEDICINE

## 2019-08-01 ASSESSMENT — SLEEP AND FATIGUE QUESTIONNAIRES
HOW LIKELY ARE YOU TO NOD OFF OR FALL ASLEEP WHILE SITTING INACTIVE IN A PUBLIC PLACE: 1
HOW LIKELY ARE YOU TO NOD OFF OR FALL ASLEEP WHEN YOU ARE A PASSENGER IN A CAR FOR AN HOUR WITHOUT A BREAK: 1
HOW LIKELY ARE YOU TO NOD OFF OR FALL ASLEEP IN A CAR, WHILE STOPPED FOR A FEW MINUTES IN TRAFFIC: 1
HOW LIKELY ARE YOU TO NOD OFF OR FALL ASLEEP WHILE SITTING AND READING: 2
HOW LIKELY ARE YOU TO NOD OFF OR FALL ASLEEP WHILE SITTING AND TALKING TO SOMEONE: 1
ESS TOTAL SCORE: 10
HOW LIKELY ARE YOU TO NOD OFF OR FALL ASLEEP WHILE LYING DOWN TO REST IN THE AFTERNOON WHEN CIRCUMSTANCES PERMIT: 2
NECK CIRCUMFERENCE (INCHES): 16.5
HOW LIKELY ARE YOU TO NOD OFF OR FALL ASLEEP WHILE WATCHING TV: 1
HOW LIKELY ARE YOU TO NOD OFF OR FALL ASLEEP WHILE SITTING QUIETLY AFTER LUNCH WITHOUT ALCOHOL: 1

## 2019-08-07 ENCOUNTER — HOSPITAL ENCOUNTER (OUTPATIENT)
Dept: CT IMAGING | Age: 69
Discharge: HOME | End: 2019-08-07
Payer: MEDICARE

## 2019-08-07 DIAGNOSIS — R05.3 CHRONIC COUGH: ICD-10-CM

## 2019-08-07 DIAGNOSIS — R93.89 ABNORMAL CXR: ICD-10-CM

## 2019-08-07 PROCEDURE — 71250 CT THORAX DX C-: CPT

## 2019-09-06 ENCOUNTER — ANTI-COAG VISIT (OUTPATIENT)
Dept: PHARMACY | Age: 69
End: 2019-09-06
Payer: MEDICARE

## 2019-09-06 LAB — INTERNATIONAL NORMALIZATION RATIO, POC: 1.9

## 2019-09-06 PROCEDURE — 99211 OFF/OP EST MAY X REQ PHY/QHP: CPT

## 2019-09-06 PROCEDURE — 85610 PROTHROMBIN TIME: CPT

## 2019-09-10 ENCOUNTER — TELEPHONE (OUTPATIENT)
Dept: PULMONOLOGY | Age: 69
End: 2019-09-10

## 2019-09-17 RX ORDER — METOPROLOL SUCCINATE 50 MG/1
50 TABLET, EXTENDED RELEASE ORAL DAILY
Qty: 90 TABLET | Refills: 2 | Status: SHIPPED | OUTPATIENT
Start: 2019-09-17 | End: 2021-07-02 | Stop reason: SDUPTHER

## 2019-09-17 NOTE — TELEPHONE ENCOUNTER
KESHIA Kimball CNP           5:15 PM   Note      I stopped his digoxin due to bradycardia. Remains slow on EKG. OK to decrease metoprolol to 50mg po QD.  He will need to monitor BP at home and call if consistently out of goal ranges.                   5:19 PM   KESHIA Kimball CNP routed this conversation to [facility]

## 2019-10-03 ENCOUNTER — TELEPHONE (OUTPATIENT)
Dept: ADMINISTRATIVE | Age: 69
End: 2019-10-03

## 2019-10-04 ENCOUNTER — ANTI-COAG VISIT (OUTPATIENT)
Dept: PHARMACY | Age: 69
End: 2019-10-04
Payer: MEDICARE

## 2019-10-04 LAB — INTERNATIONAL NORMALIZATION RATIO, POC: 1.7

## 2019-10-04 PROCEDURE — 99211 OFF/OP EST MAY X REQ PHY/QHP: CPT

## 2019-10-04 PROCEDURE — 85610 PROTHROMBIN TIME: CPT

## 2019-10-29 RX ORDER — WARFARIN SODIUM 5 MG/1
TABLET ORAL
Qty: 90 TABLET | Refills: 1 | Status: ON HOLD | OUTPATIENT
Start: 2019-10-29 | End: 2020-01-13 | Stop reason: HOSPADM

## 2019-11-01 ENCOUNTER — ANTI-COAG VISIT (OUTPATIENT)
Dept: PHARMACY | Age: 69
End: 2019-11-01
Payer: MEDICARE

## 2019-11-01 DIAGNOSIS — Z79.01 LONG TERM CURRENT USE OF ANTICOAGULANT: ICD-10-CM

## 2019-11-01 LAB — INTERNATIONAL NORMALIZATION RATIO, POC: 2.1

## 2019-11-01 PROCEDURE — 99211 OFF/OP EST MAY X REQ PHY/QHP: CPT

## 2019-11-01 PROCEDURE — 85610 PROTHROMBIN TIME: CPT

## 2019-11-25 RX ORDER — FLECAINIDE ACETATE 100 MG/1
TABLET ORAL
Qty: 90 TABLET | Refills: 1 | Status: SHIPPED | OUTPATIENT
Start: 2019-11-25 | End: 2021-02-19 | Stop reason: SDUPTHER

## 2019-12-02 ENCOUNTER — ANTI-COAG VISIT (OUTPATIENT)
Dept: PHARMACY | Age: 69
End: 2019-12-02
Payer: MEDICARE

## 2019-12-02 DIAGNOSIS — Z79.01 LONG TERM CURRENT USE OF ANTICOAGULANT: ICD-10-CM

## 2019-12-02 LAB — INR BLD: 2

## 2019-12-02 PROCEDURE — 99211 OFF/OP EST MAY X REQ PHY/QHP: CPT | Performed by: INTERNAL MEDICINE

## 2019-12-02 PROCEDURE — 85610 PROTHROMBIN TIME: CPT | Performed by: INTERNAL MEDICINE

## 2020-01-06 ENCOUNTER — ANTI-COAG VISIT (OUTPATIENT)
Dept: PHARMACY | Age: 70
End: 2020-01-06
Payer: MEDICARE

## 2020-01-06 LAB — INR BLD: 1.7

## 2020-01-06 PROCEDURE — 85610 PROTHROMBIN TIME: CPT | Performed by: INTERNAL MEDICINE

## 2020-01-06 PROCEDURE — 99211 OFF/OP EST MAY X REQ PHY/QHP: CPT | Performed by: INTERNAL MEDICINE

## 2020-01-06 NOTE — PROGRESS NOTES
MrAnna Wiley is here for management of anticoagulation for AFib. PMH also significant for HTN, HLD, s/p ablation and DM. He presents today w/out complaint. Pt verified dosing regimen. Pt denies s/s bleeding/bruising/swelling/SOB. No BRBPR. No melena. No missed doses. No changes in Rx/OTC/herbal medications. No changes in diet. Denies EtOH and tobacco use. Patient states had a large salad last night     INR 1.7 is below the acceptable therapeutic range of 2-3. Recommended to take 5 mg today and then continue 5 mg daily, except 2.5 mg every Mon/Wed/Fri. Pt has 5 mg tabs. Will continue to monitor and check INR in 4 weeks. Dosing reminder card given with phone number, appointment date and time. Return to clinic: 2/7 at 7 am  Referring physician: Dr. Elpidio McgarryD. Candidate 2020 01/06/20 7:11 AM    I have seen the patient and reviewed the progress note written by the PharmD Candidate. I agree with this assessment and plan.    America Camacho, MalgorzataD 1/6/2020 8:02 AM

## 2020-01-07 ENCOUNTER — OFFICE VISIT (OUTPATIENT)
Dept: CARDIOLOGY CLINIC | Age: 70
End: 2020-01-07
Payer: MEDICARE

## 2020-01-07 VITALS
HEART RATE: 52 BPM | SYSTOLIC BLOOD PRESSURE: 124 MMHG | BODY MASS INDEX: 29.25 KG/M2 | HEIGHT: 66 IN | DIASTOLIC BLOOD PRESSURE: 72 MMHG | WEIGHT: 182 LBS

## 2020-01-07 PROBLEM — R00.1 SINUS BRADYCARDIA: Status: ACTIVE | Noted: 2020-01-07

## 2020-01-07 PROCEDURE — 1036F TOBACCO NON-USER: CPT | Performed by: NURSE PRACTITIONER

## 2020-01-07 PROCEDURE — 99214 OFFICE O/P EST MOD 30 MIN: CPT | Performed by: NURSE PRACTITIONER

## 2020-01-07 PROCEDURE — 93000 ELECTROCARDIOGRAM COMPLETE: CPT | Performed by: NURSE PRACTITIONER

## 2020-01-07 PROCEDURE — 4040F PNEUMOC VAC/ADMIN/RCVD: CPT | Performed by: NURSE PRACTITIONER

## 2020-01-07 PROCEDURE — 3017F COLORECTAL CA SCREEN DOC REV: CPT | Performed by: NURSE PRACTITIONER

## 2020-01-07 PROCEDURE — G8417 CALC BMI ABV UP PARAM F/U: HCPCS | Performed by: NURSE PRACTITIONER

## 2020-01-07 PROCEDURE — 1123F ACP DISCUSS/DSCN MKR DOCD: CPT | Performed by: NURSE PRACTITIONER

## 2020-01-07 PROCEDURE — G8484 FLU IMMUNIZE NO ADMIN: HCPCS | Performed by: NURSE PRACTITIONER

## 2020-01-07 PROCEDURE — G8427 DOCREV CUR MEDS BY ELIG CLIN: HCPCS | Performed by: NURSE PRACTITIONER

## 2020-01-07 NOTE — PROGRESS NOTES
KESHIA Vega CNP   warfarin (COUMADIN) 5 MG tablet TAKE 1 TABLET ONE TIME DAILY  OR AS DIRECTED  BY  CLINIC 10/29/19  Yes KESHIA Vega CNP   metoprolol succinate (TOPROL XL) 50 MG extended release tablet Take 1 tablet by mouth daily 9/17/19  Yes KESHIA Vega CNP   metFORMIN (GLUCOPHAGE) 1000 MG tablet TAKE 1 TABLET TWICE DAILY WITH MEALS 3/2/19  Yes KESHIA Vega CNP   HYDROcodone-acetaminophen ([address]) 7.5-325 MG per tablet TAKE ONE TABLET BY MOUTH EVERY 4-6 HOURS AS NEEDED FOR PAIN. MAXIMUM 5 per day. . 3/3/17  Yes Vita Heath MD   fenofibrate 160 MG tablet TAKE 1 TABLET ONE TIME DAILY 1/9/17  Yes Vita Heath MD   losartan-hydrochlorothiazide [facility]) 100-12.5 MG per tablet TAKE 1 TABLET EVERY DAY 12/8/16  Yes Vita Heath MD   atorvastatin (LIPITOR) 40 MG tablet TAKE 1 TABLET BY MOUTH ONE TIME A DAY 11/17/16  Yes Vita Heath MD   Multiple Vitamins-Minerals (THERAPEUTIC MULTIVITAMIN-MINERALS) tablet Take 1 tablet by mouth daily. Yes Historical Provider, MD   Cholecalciferol (VITAMIN D) 2000 UNITS CAPS capsule Take  by mouth daily. Yes Historical Provider, MD   vitamin B-12 (CYANOCOBALAMIN) 1000 MCG tablet Take 1,200 mcg by mouth daily. Yes Historical Provider, MD       Allergies:  Patient has no known allergies. Social History:   reports that he quit smoking about 15 years ago. His smoking use included cigarettes. He has a 28.00 pack-year smoking history. He has never used smokeless tobacco. He reports current alcohol use of about 1.0 standard drinks of alcohol per week. He reports that he does not use drugs. Family History: family history includes Diabetes in his father; Heart Disease in his brother; High Blood Pressure in his mother; Stroke in his mother. REVIEW OF SYSTEMS:    Constitutional: Negative. HENT: Negative   Eyes: Negative. Respiratory: Negative   Cardiovascular: see HPI  Gastrointestinal: Negative.     Genitourinary: Negative. Musculoskeletal: Negative. Skin: Negative. Neurological: Negative. Hematological: Negative. Psychiatric/Behavioral: Negative. PHYSICAL EXAM:    Vital signs:    /72   Pulse 52   Ht 5' 6\" (1.676 m)   Wt 182 lb (82.6 kg)   BMI 29.38 kg/m²      Constitutional and General Appearance: alert, cooperative, no distress and appears stated age  [de-identified]: PERRL, no cervical lymphadenopathy. No masses palpable. Normal oral mucosa; + left conjunctival hemorrhage  Respiratory:  · Normal excursion and expansion without use of accessory muscles  · Resp Auscultation: Normal breath sounds without wheezing, rhonchi, or rales except few exp wheezes in left upper lung  Cardiovascular:  · The apical impulse is not displaced  · Heart tones are crisp and normal. Regular S1 and S2.  · Jugular venous pulsation Normal  · The carotid upstroke is normal in amplitude and contour without delay or bruit  · Peripheral pulses are symmetrical and full   Abdomen:  · No masses or tenderness  · Bowel sounds present  Extremities:  ·  No cyanosis or clubbing  ·  Lower extremity edema: No  ·  Skin: Warm and dry  Neurological:  · Alert and oriented. · Moves all extremities well  · No abnormalities of mood, affect, memory, mentation, or behavior are noted    DATA:    ECG 1/7/2019:  Sinus sylvia with a 1st degree AVB with a HR of 52    RITU 2/28/2019:   Ejection fraction is visually estimated to be 55-60 %.   Mild mitral and tricuspid regurgitation.  Garnell Reusing is no evidence of mass or thrombus in the left atrium or appendage.     Echo 1/11/2019:  Normal left ventricle systolic function with an estimated ejection fraction of 55%.   No regional wall motion abnormalities are seen.   Normal left ventricular diastolic filling pressure.   The left atrium is mildly dilated.   Mild mitral and tricuspid regurgitation.   Systolic pulmonary artery pressure (SPAP) is normal and estimated at 32 mmHg   (right atrial pressure 3 mmHg).     Echo 11/12/2012:  1. Technically difficult study. Left ventricular systolic function  appears preserved. There is probable left ventricular hypertrophy. 2. Left atrium is enlarged. GXT 12/12/2011:  CONCLUSIONS-  1. Mild fitness impairment for age. 2. Nondiagnostic study due to inadequate cardiac stress but no  diagnostic abnormalities at a heart rate of 116. CARDIOLOGY LABS:   CBC: No results for input(s): WBC, HGB, HCT, PLT in the last 72 hours. BMP: No results for input(s): NA, K, CO2, BUN, CREATININE, LABGLOM, GLUCOSE in the last 72 hours. PT/INR:   Recent Labs     01/06/20  0703   INR 1.7     APTT:No results for input(s): APTT in the last 72 hours. FASTING LIPID PANEL:  Lab Results   Component Value Date    HDL 36 01/04/2017    HDL 43 04/06/2012    LDLDIRECT 146 07/05/2016    LDLCALC 87 01/04/2017    TRIG 281 01/04/2017     LIVER PROFILE:No results for input(s): AST, ALT, ALB in the last 72 hours. Assessment:  1. Paroxysmal (formerly persistent) atrial arrhythmias (AF and AFL): stable   -s/p RFCA of PAF using WACA technique on 2/28/2019   -24 hour Holter in 3/2019 showed atrial flutter   -[id number] score 3 (age, HTN, DM)  2. First degree AV block: stable  3. Sinus bradycardia: stable, asymptomatic   4. HTN: controlled   5. DM: PCP managing  6. ALCIDES: moderate, compliant with BiPAP    Plan:  1. Continue flecainide, Toprol, losartan, HCTZ, and Coumadin (Clinic managing)   2. Recommend EP study and possible RFCA if atrial flutter is recurrent (with treatment of ALCIDES, hoping atrial arrhythmias are not recurrent)  3. Patient is asymptomatic with bradycardia. Will continue to monitor   4. Annual BMP and CBC (due 7/2020)  5.  Follow up in 6 months     Asuncioncarlos López, [address]  [phone]

## 2020-01-07 NOTE — LETTER
Hypercholesteremia, Hypertension, Kidney stone on left side, Type II or unspecified type diabetes mellitus without mention of complication, not stated as uncontrolled, and Wears glasses. Past Surgical History:   has a past surgical history that includes Appendectomy (Right, 01/01/1955); skin biopsy (Right, 01/01/2007); Colonoscopy; and Cardiac surgery. Home Medications:    Prior to Admission medications    Medication Sig Start Date End Date Taking? Authorizing Provider   flecainide (TAMBOCOR) 100 MG tablet TAKE 1 TABLET EVERY DAY 11/25/19  Yes KESHIA Adams CNP   warfarin (COUMADIN) 5 MG tablet TAKE 1 TABLET ONE TIME DAILY  OR AS DIRECTED  BY  CLINIC 10/29/19  Yes KESHIA Adams CNP   metoprolol succinate (TOPROL XL) 50 MG extended release tablet Take 1 tablet by mouth daily 9/17/19  Yes KESHIA Adams CNP   metFORMIN (GLUCOPHAGE) 1000 MG tablet TAKE 1 TABLET TWICE DAILY WITH MEALS 3/2/19  Yes KESHIA Adams CNP   HYDROcodone-acetaminophen ([facility]) 7.5-325 MG per tablet TAKE ONE TABLET BY MOUTH EVERY 4-6 HOURS AS NEEDED FOR PAIN. MAXIMUM 5 per day. . 3/3/17  Yes Gema Tate MD   fenofibrate 160 MG tablet TAKE 1 TABLET ONE TIME DAILY 1/9/17  Yes Gema Tate MD   losartan-hydrochlorothiazide [facility]) 100-12.5 MG per tablet TAKE 1 TABLET EVERY DAY 12/8/16  Yes Gema Tate MD   atorvastatin (LIPITOR) 40 MG tablet TAKE 1 TABLET BY MOUTH ONE TIME A DAY 11/17/16  Yes Gema Tate MD   Multiple Vitamins-Minerals (THERAPEUTIC MULTIVITAMIN-MINERALS) tablet Take 1 tablet by mouth daily. Yes Historical Provider, MD   Cholecalciferol (VITAMIN D) 2000 UNITS CAPS capsule Take  by mouth daily. Yes Historical Provider, MD   vitamin B-12 (CYANOCOBALAMIN) 1000 MCG tablet Take 1,200 mcg by mouth daily. Yes Historical Provider, MD       Allergies:  Patient has no known allergies. Social History:   reports that he quit smoking about 15 years ago.  His Ejection fraction is visually estimated to be 55-60 %.   Mild mitral and tricuspid regurgitation.  Kelton Albertma is no evidence of mass or thrombus in the left atrium or appendage.     Echo 1/11/2019:  Normal left ventricle systolic function with an estimated ejection fraction of 55%.   No regional wall motion abnormalities are seen.   Normal left ventricular diastolic filling pressure.   The left atrium is mildly dilated.   Mild mitral and tricuspid regurgitation.   Systolic pulmonary artery pressure (SPAP) is normal and estimated at 32 mmHg   (right atrial pressure 3 mmHg). Echo 11/12/2012:  1. Technically difficult study. Left ventricular systolic function  appears preserved. There is probable left ventricular hypertrophy. 2. Left atrium is enlarged. GXT 12/12/2011:  CONCLUSIONS-  1. Mild fitness impairment for age. 2. Nondiagnostic study due to inadequate cardiac stress but no  diagnostic abnormalities at a heart rate of 116. CARDIOLOGY LABS:   CBC: No results for input(s): WBC, HGB, HCT, PLT in the last 72 hours. BMP: No results for input(s): NA, K, CO2, BUN, CREATININE, LABGLOM, GLUCOSE in the last 72 hours. PT/INR:   Recent Labs     01/06/20  0703   INR 1.7     APTT:No results for input(s): APTT in the last 72 hours. FASTING LIPID PANEL:  Lab Results   Component Value Date    HDL 36 01/04/2017    HDL 43 04/06/2012    LDLDIRECT 146 07/05/2016    LDLCALC 87 01/04/2017    TRIG 281 01/04/2017     LIVER PROFILE:No results for input(s): AST, ALT, ALB in the last 72 hours. Assessment:  1. Paroxysmal (formerly persistent) atrial arrhythmias (AF and AFL): stable   -s/p RFCA of PAF using WACA technique on 2/28/2019   -24 hour Holter in 3/2019 showed atrial flutter   -[id number] score 3 (age, HTN, DM)  2. First degree AV block: stable  3. Sinus bradycardia: stable, asymptomatic   4. HTN: controlled   5. DM: PCP managing  6.  ALCIDES: moderate, compliant with BiPAP    Plan:

## 2020-01-12 ENCOUNTER — APPOINTMENT (OUTPATIENT)
Dept: GENERAL RADIOLOGY | Age: 70
DRG: 069 | End: 2020-01-12
Payer: MEDICARE

## 2020-01-12 ENCOUNTER — APPOINTMENT (OUTPATIENT)
Dept: CT IMAGING | Age: 70
DRG: 069 | End: 2020-01-12
Payer: MEDICARE

## 2020-01-12 ENCOUNTER — APPOINTMENT (OUTPATIENT)
Dept: MRI IMAGING | Age: 70
DRG: 069 | End: 2020-01-12
Payer: MEDICARE

## 2020-01-12 ENCOUNTER — HOSPITAL ENCOUNTER (INPATIENT)
Age: 70
LOS: 1 days | Discharge: HOME | DRG: 069 | End: 2020-01-13
Attending: EMERGENCY MEDICINE | Admitting: INTERNAL MEDICINE
Payer: MEDICARE

## 2020-01-12 PROBLEM — R42 VERTIGO: Status: ACTIVE | Noted: 2020-01-12

## 2020-01-12 LAB
A/G RATIO: 1.5 (ref 1.1–2.2)
ALBUMIN SERPL-MCNC: 4 G/DL (ref 3.4–5)
ALP BLD-CCNC: 33 U/L (ref 40–129)
ALT SERPL-CCNC: 26 U/L (ref 10–40)
AMPHETAMINE SCREEN, URINE: NORMAL
ANION GAP SERPL CALCULATED.3IONS-SCNC: 11 MMOL/L (ref 3–16)
AST SERPL-CCNC: 26 U/L (ref 15–37)
BARBITURATE SCREEN URINE: NORMAL
BASOPHILS ABSOLUTE: 0 K/UL (ref 0–0.2)
BASOPHILS RELATIVE PERCENT: 0.6 %
BENZODIAZEPINE SCREEN, URINE: NORMAL
BILIRUB SERPL-MCNC: 0.5 MG/DL (ref 0–1)
BILIRUBIN URINE: NEGATIVE
BLOOD, URINE: NEGATIVE
BUN BLDV-MCNC: 21 MG/DL (ref 7–20)
CALCIUM SERPL-MCNC: 9.4 MG/DL (ref 8.3–10.6)
CANNABINOID SCREEN URINE: NORMAL
CHLORIDE BLD-SCNC: 104 MMOL/L (ref 99–110)
CLARITY: CLEAR
CO2: 25 MMOL/L (ref 21–32)
COCAINE METABOLITE SCREEN URINE: NORMAL
COLOR: YELLOW
CREAT SERPL-MCNC: 0.9 MG/DL (ref 0.8–1.3)
EKG ATRIAL RATE: 55 BPM
EKG DIAGNOSIS: NORMAL
EKG P AXIS: 69 DEGREES
EKG P-R INTERVAL: 236 MS
EKG Q-T INTERVAL: 502 MS
EKG QRS DURATION: 108 MS
EKG QTC CALCULATION (BAZETT): 480 MS
EKG R AXIS: 0 DEGREES
EKG T AXIS: 44 DEGREES
EKG VENTRICULAR RATE: 55 BPM
EOSINOPHILS ABSOLUTE: 0.4 K/UL (ref 0–0.6)
EOSINOPHILS RELATIVE PERCENT: 5.3 %
GFR AFRICAN AMERICAN: >60
GFR NON-AFRICAN AMERICAN: >60
GLOBULIN: 2.6 G/DL
GLUCOSE BLD-MCNC: 102 MG/DL (ref 70–99)
GLUCOSE BLD-MCNC: 148 MG/DL (ref 70–99)
GLUCOSE BLD-MCNC: 155 MG/DL (ref 70–99)
GLUCOSE BLD-MCNC: 172 MG/DL (ref 70–99)
GLUCOSE URINE: NEGATIVE MG/DL
HCT VFR BLD CALC: 42.3 % (ref 40.5–52.5)
HEMOGLOBIN: 14.2 G/DL (ref 13.5–17.5)
INR BLD: 1.51 (ref 0.86–1.14)
KETONES, URINE: NEGATIVE MG/DL
LEUKOCYTE ESTERASE, URINE: NEGATIVE
LYMPHOCYTES ABSOLUTE: 1.6 K/UL (ref 1–5.1)
LYMPHOCYTES RELATIVE PERCENT: 20.9 %
Lab: NORMAL
MCH RBC QN AUTO: 30.5 PG (ref 26–34)
MCHC RBC AUTO-ENTMCNC: 33.6 G/DL (ref 31–36)
MCV RBC AUTO: 90.9 FL (ref 80–100)
METHADONE SCREEN, URINE: NORMAL
MICROSCOPIC EXAMINATION: NORMAL
MONOCYTES ABSOLUTE: 0.4 K/UL (ref 0–1.3)
MONOCYTES RELATIVE PERCENT: 5.7 %
NEUTROPHILS ABSOLUTE: 5.1 K/UL (ref 1.7–7.7)
NEUTROPHILS RELATIVE PERCENT: 67.5 %
NITRITE, URINE: NEGATIVE
OPIATE SCREEN URINE: NORMAL
OXYCODONE URINE: NORMAL
PDW BLD-RTO: 13.5 % (ref 12.4–15.4)
PERFORMED ON: ABNORMAL
PH UA: 7
PH UA: 7 (ref 5–8)
PHENCYCLIDINE SCREEN URINE: NORMAL
PLATELET # BLD: 217 K/UL (ref 135–450)
PMV BLD AUTO: 8.8 FL (ref 5–10.5)
POTASSIUM REFLEX MAGNESIUM: 3.7 MMOL/L (ref 3.5–5.1)
PROPOXYPHENE SCREEN: NORMAL
PROTEIN UA: NEGATIVE MG/DL
PROTHROMBIN TIME: 17.6 SEC (ref 10–13.2)
RBC # BLD: 4.65 M/UL (ref 4.2–5.9)
SODIUM BLD-SCNC: 140 MMOL/L (ref 136–145)
SPECIFIC GRAVITY UA: 1.01 (ref 1–1.03)
TOTAL PROTEIN: 6.6 G/DL (ref 6.4–8.2)
TROPONIN: <0.01 NG/ML
URINE REFLEX TO CULTURE: NORMAL
URINE TYPE: NORMAL
UROBILINOGEN, URINE: 0.2 E.U./DL
WBC # BLD: 7.6 K/UL (ref 4–11)

## 2020-01-12 PROCEDURE — 80307 DRUG TEST PRSMV CHEM ANLYZR: CPT

## 2020-01-12 PROCEDURE — 70496 CT ANGIOGRAPHY HEAD: CPT

## 2020-01-12 PROCEDURE — 70450 CT HEAD/BRAIN W/O DYE: CPT

## 2020-01-12 PROCEDURE — 6370000000 HC RX 637 (ALT 250 FOR IP): Performed by: NURSE PRACTITIONER

## 2020-01-12 PROCEDURE — 80053 COMPREHEN METABOLIC PANEL: CPT

## 2020-01-12 PROCEDURE — 71045 X-RAY EXAM CHEST 1 VIEW: CPT

## 2020-01-12 PROCEDURE — 84443 ASSAY THYROID STIM HORMONE: CPT

## 2020-01-12 PROCEDURE — 36415 COLL VENOUS BLD VENIPUNCTURE: CPT

## 2020-01-12 PROCEDURE — 85610 PROTHROMBIN TIME: CPT

## 2020-01-12 PROCEDURE — 99285 EMERGENCY DEPT VISIT HI MDM: CPT

## 2020-01-12 PROCEDURE — 2580000003 HC RX 258: Performed by: EMERGENCY MEDICINE

## 2020-01-12 PROCEDURE — 82306 VITAMIN D 25 HYDROXY: CPT

## 2020-01-12 PROCEDURE — 84484 ASSAY OF TROPONIN QUANT: CPT

## 2020-01-12 PROCEDURE — 6370000000 HC RX 637 (ALT 250 FOR IP): Performed by: EMERGENCY MEDICINE

## 2020-01-12 PROCEDURE — 1200000000 HC SEMI PRIVATE

## 2020-01-12 PROCEDURE — 93005 ELECTROCARDIOGRAM TRACING: CPT | Performed by: EMERGENCY MEDICINE

## 2020-01-12 PROCEDURE — 81003 URINALYSIS AUTO W/O SCOPE: CPT

## 2020-01-12 PROCEDURE — 6360000004 HC RX CONTRAST MEDICATION: Performed by: EMERGENCY MEDICINE

## 2020-01-12 PROCEDURE — 97116 GAIT TRAINING THERAPY: CPT

## 2020-01-12 PROCEDURE — 70551 MRI BRAIN STEM W/O DYE: CPT

## 2020-01-12 PROCEDURE — 6370000000 HC RX 637 (ALT 250 FOR IP): Performed by: INTERNAL MEDICINE

## 2020-01-12 PROCEDURE — 2580000003 HC RX 258: Performed by: NURSE PRACTITIONER

## 2020-01-12 PROCEDURE — 85025 COMPLETE CBC W/AUTO DIFF WBC: CPT

## 2020-01-12 PROCEDURE — 82607 VITAMIN B-12: CPT

## 2020-01-12 PROCEDURE — 83036 HEMOGLOBIN GLYCOSYLATED A1C: CPT

## 2020-01-12 PROCEDURE — 97161 PT EVAL LOW COMPLEX 20 MIN: CPT

## 2020-01-12 PROCEDURE — 93010 ELECTROCARDIOGRAM REPORT: CPT | Performed by: INTERNAL MEDICINE

## 2020-01-12 RX ORDER — ASPIRIN 81 MG/1
81 TABLET ORAL DAILY
Status: DISCONTINUED | OUTPATIENT
Start: 2020-01-12 | End: 2020-01-13 | Stop reason: HOSPADM

## 2020-01-12 RX ORDER — INSULIN GLARGINE 100 [IU]/ML
0.15 INJECTION, SOLUTION SUBCUTANEOUS NIGHTLY
Status: DISCONTINUED | OUTPATIENT
Start: 2020-01-12 | End: 2020-01-13 | Stop reason: HOSPADM

## 2020-01-12 RX ORDER — SODIUM CHLORIDE 0.9 % (FLUSH) 0.9 %
10 SYRINGE (ML) INJECTION EVERY 12 HOURS SCHEDULED
Status: DISCONTINUED | OUTPATIENT
Start: 2020-01-12 | End: 2020-01-13 | Stop reason: HOSPADM

## 2020-01-12 RX ORDER — 0.9 % SODIUM CHLORIDE 0.9 %
1000 INTRAVENOUS SOLUTION INTRAVENOUS ONCE
Status: COMPLETED | OUTPATIENT
Start: 2020-01-12 | End: 2020-01-12

## 2020-01-12 RX ORDER — DEXTROSE MONOHYDRATE 50 MG/ML
100 INJECTION, SOLUTION INTRAVENOUS PRN
Status: DISCONTINUED | OUTPATIENT
Start: 2020-01-12 | End: 2020-01-13 | Stop reason: HOSPADM

## 2020-01-12 RX ORDER — MECLIZINE HCL 12.5 MG/1
25 TABLET ORAL ONCE
Status: COMPLETED | OUTPATIENT
Start: 2020-01-12 | End: 2020-01-12

## 2020-01-12 RX ORDER — ASPIRIN 300 MG/1
300 SUPPOSITORY RECTAL DAILY
Status: DISCONTINUED | OUTPATIENT
Start: 2020-01-12 | End: 2020-01-13 | Stop reason: HOSPADM

## 2020-01-12 RX ORDER — DEXTROSE MONOHYDRATE 25 G/50ML
12.5 INJECTION, SOLUTION INTRAVENOUS PRN
Status: DISCONTINUED | OUTPATIENT
Start: 2020-01-12 | End: 2020-01-13 | Stop reason: HOSPADM

## 2020-01-12 RX ORDER — FLECAINIDE ACETATE 100 MG/1
100 TABLET ORAL EVERY 12 HOURS SCHEDULED
Status: CANCELLED | OUTPATIENT
Start: 2020-01-12

## 2020-01-12 RX ORDER — SODIUM CHLORIDE 0.9 % (FLUSH) 0.9 %
10 SYRINGE (ML) INJECTION PRN
Status: DISCONTINUED | OUTPATIENT
Start: 2020-01-12 | End: 2020-01-13 | Stop reason: HOSPADM

## 2020-01-12 RX ORDER — NICOTINE POLACRILEX 4 MG
15 LOZENGE BUCCAL PRN
Status: DISCONTINUED | OUTPATIENT
Start: 2020-01-12 | End: 2020-01-13 | Stop reason: HOSPADM

## 2020-01-12 RX ORDER — FENOFIBRATE 160 MG/1
160 TABLET ORAL DAILY
Status: DISCONTINUED | OUTPATIENT
Start: 2020-01-12 | End: 2020-01-13 | Stop reason: HOSPADM

## 2020-01-12 RX ORDER — WARFARIN SODIUM 5 MG/1
5 TABLET ORAL
Status: COMPLETED | OUTPATIENT
Start: 2020-01-12 | End: 2020-01-12

## 2020-01-12 RX ORDER — HYDROCODONE BITARTRATE AND ACETAMINOPHEN 7.5; 325 MG/1; MG/1
1 TABLET ORAL EVERY 4 HOURS PRN
Status: DISCONTINUED | OUTPATIENT
Start: 2020-01-12 | End: 2020-01-13 | Stop reason: HOSPADM

## 2020-01-12 RX ORDER — METOPROLOL SUCCINATE 50 MG/1
50 TABLET, EXTENDED RELEASE ORAL DAILY
Status: DISCONTINUED | OUTPATIENT
Start: 2020-01-12 | End: 2020-01-13 | Stop reason: HOSPADM

## 2020-01-12 RX ORDER — DIAZEPAM 5 MG/1
5 TABLET ORAL ONCE
Status: COMPLETED | OUTPATIENT
Start: 2020-01-12 | End: 2020-01-12

## 2020-01-12 RX ORDER — ATORVASTATIN CALCIUM 40 MG/1
40 TABLET, FILM COATED ORAL NIGHTLY
Status: DISCONTINUED | OUTPATIENT
Start: 2020-01-12 | End: 2020-01-13 | Stop reason: HOSPADM

## 2020-01-12 RX ORDER — ONDANSETRON 2 MG/ML
4 INJECTION INTRAMUSCULAR; INTRAVENOUS EVERY 6 HOURS PRN
Status: DISCONTINUED | OUTPATIENT
Start: 2020-01-12 | End: 2020-01-13 | Stop reason: HOSPADM

## 2020-01-12 RX ADMIN — INSULIN LISPRO 2 UNITS: 100 INJECTION, SOLUTION INTRAVENOUS; SUBCUTANEOUS at 18:00

## 2020-01-12 RX ADMIN — Medication 10 ML: at 21:50

## 2020-01-12 RX ADMIN — ATORVASTATIN CALCIUM 40 MG: 40 TABLET, FILM COATED ORAL at 21:49

## 2020-01-12 RX ADMIN — WARFARIN SODIUM 5 MG: 5 TABLET ORAL at 17:39

## 2020-01-12 RX ADMIN — MECLIZINE 25 MG: 12.5 TABLET ORAL at 08:05

## 2020-01-12 RX ADMIN — IOPAMIDOL 75 ML: 755 INJECTION, SOLUTION INTRAVENOUS at 07:35

## 2020-01-12 RX ADMIN — DIAZEPAM 5 MG: 5 TABLET ORAL at 11:01

## 2020-01-12 RX ADMIN — INSULIN GLARGINE 12 UNITS: 100 INJECTION, SOLUTION SUBCUTANEOUS at 21:50

## 2020-01-12 RX ADMIN — ASPIRIN 81 MG: 81 TABLET, COATED ORAL at 14:49

## 2020-01-12 RX ADMIN — SODIUM CHLORIDE 1000 ML: 9 INJECTION, SOLUTION INTRAVENOUS at 08:05

## 2020-01-12 ASSESSMENT — PAIN SCALES - GENERAL
PAINLEVEL_OUTOF10: 0
PAINLEVEL_OUTOF10: 0

## 2020-01-12 ASSESSMENT — ENCOUNTER SYMPTOMS
SHORTNESS OF BREATH: 0
ABDOMINAL PAIN: 0
VOMITING: 0
SINUS PRESSURE: 0

## 2020-01-12 NOTE — PROGRESS NOTES
4 Eyes Skin Assessment     The patient is being assess for  Admission    I agree that 2 RN's have performed a thorough Head to Toe Skin Assessment on the patient. ALL assessment sites listed below have been assessed. Areas assessed by both nurses: Emanuel Credit  [x]   Head, Face, and Ears   [x]   Shoulders, Back, and Chest  [x]   Arms, Elbows, and Hands   [x]   Coccyx, Sacrum, and IschIum  [x]   Legs, Feet, and Heels        Does the Patient have Skin Breakdown?   No         Richar Prevention initiated:  No   Wound Care Orders initiated:  No      [facility] nurse consulted for Pressure Injury (Stage 3,4, Unstageable, DTI, NWPT, and Complex wounds), New and Established Ostomies:  No      Nurse 1 eSignature: Electronically signed by Niurka Koch RN on 1/12/20 at 1:03 PM    **SHARE this note so that the co-signing nurse is able to place an eSignature**    Nurse 2 eSignature: Electronically signed by Paul Sanchez RN on 1/12/20 at 1:30 PM

## 2020-01-12 NOTE — PROGRESS NOTES
Patient admitted from ER to room 334. Patient a/ox4. VSS. Neuro assessment completed, see flowsheet. Skin assessment completed with Dub Doctor, RN. Call light in reach. Bed alarm in use. Denies further needs, will monitor.

## 2020-01-12 NOTE — ED PROVIDER NOTES
never used smokeless tobacco. He reports current alcohol use of about 1.0 standard drinks of alcohol per week. He reports that he does not use drugs. Family history:    Family History   Problem Relation Age of Onset    High Blood Pressure Mother     Stroke Mother     Diabetes Father     Heart Disease Brother        Exam  ED Triage Vitals [01/12/20 0707]   BP Temp Temp src Pulse Resp SpO2 Height Weight   (!) 141/79 97.5 °F (36.4 °C) -- 57 14 96 % -- 183 lb (83 kg)   Physical Exam  Vitals signs and nursing note reviewed. Constitutional:       General: He is not in acute distress. Appearance: He is well-developed. He is not diaphoretic. HENT:      Head: Normocephalic and atraumatic. Right Ear: Tympanic membrane normal.      Left Ear: Tympanic membrane normal.   Eyes:      General: No visual field deficit or scleral icterus. Right eye: No discharge. Left eye: No discharge. Extraocular Movements:      Right eye: Nystagmus present. Left eye: Nystagmus present. Conjunctiva/sclera: Conjunctivae normal.      Pupils: Pupils are equal, round, and reactive to light. Neck:      Musculoskeletal: Neck supple. Trachea: No tracheal deviation. Cardiovascular:      Rate and Rhythm: Normal rate and regular rhythm. Heart sounds: Normal heart sounds. No murmur. Pulmonary:      Effort: Pulmonary effort is normal. No respiratory distress. Breath sounds: Normal breath sounds. No stridor. No wheezing. Abdominal:      General: There is no distension. Palpations: Abdomen is soft. Tenderness: There is no tenderness. There is no guarding or rebound. Musculoskeletal:         General: No deformity. Right lower leg: No edema. Left lower leg: No edema. Skin:     General: Skin is warm and dry. Findings: No erythema or rash. Neurological:      Mental Status: He is alert and oriented to person, place, and time.       Cranial Nerves: No dysarthria or facial asymmetry. Sensory: Sensation is intact. Motor: Motor function is intact. No weakness, tremor or abnormal muscle tone. Coordination: Coordination normal. Finger-Nose-Finger Test normal.      Gait: Gait abnormal (patient cannot walk without someone holding on to him). Comments: Negative test of skew   Psychiatric:         Mood and Affect: Mood normal.         Speech: Speech normal.         Behavior: Behavior normal.         MDM/ED Course  ED Medication Orders (From admission, onward)    Start Ordered     Status Ordering Provider    01/12/20 1045 01/12/20 1043  diazepam (VALIUM) tablet 5 mg  ONCE      Last MAR action:  Given - by Jessika Tate on 01/12/20 at [address] [facility]    01/12/20 0800 01/12/20 0746  0.9 % sodium chloride bolus  ONCE      Last MAR action:  Stopped - by Jessika Tate on 01/12/20 at 262 [facility]    01/12/20 0800 01/12/20 0746  meclizine (ANTIVERT) tablet 25 mg  ONCE      Last MAR action:  Given - by Jessika Tate on 01/12/20 at 308 [facility]    01/12/20 0719 01/12/20 0720  iopamidol (ISOVUE-370) 76 % injection 75 mL  IMG ONCE PRN      Last MAR action:  Given - by Revivn on 01/12/20 at 0735 [facility]          EKG  The Ekg interpreted by me in the absence of a cardiologist shows. sinus bradycardia, rate=55    Axis is   Normal  QTc is  prolonged  1st degree AV block  No specific ST-T wave changes appreciated. No evidence of acute ischemia. No significant change from prior EKG dated 7/25/19      Radiology  Ct Head Wo Contrast    Result Date: 1/12/2020  EXAMINATION: CT OF THE HEAD WITHOUT CONTRAST  1/12/2020 7:27 am TECHNIQUE: CT of the head was performed without the administration of intravenous contrast. Dose modulation, iterative reconstruction, and/or weight based adjustment of the mA/kV was utilized to reduce the radiation dose to as low as reasonably achievable.  COMPARISON: 07/25/2019 HISTORY: ORDERING SYSTEM PROVIDED HISTORY: modulation, iterative reconstruction, and/or weight based adjustment of the mA/kV was utilized to reduce the radiation dose to as low as reasonably achievable. 3D surface reformatted and curved MIP images were submitted for review. COMPARISON: 7/25/2019 HISTORY: ORDERING SYSTEM PROVIDED HISTORY: Stroke like symptoms TECHNOLOGIST PROVIDED HISTORY: Reason for exam:->Stroke like symptoms Reason for Exam: Dizzy, difficulty walking Acuity: Acute Type of Exam: Initial Relevant Medical/Surgical History: H/O a-fib, coumadin FINDINGS: CTA NECK: AORTIC ARCH/ARCH VESSELS: No dissection or arterial injury. No significant stenosis of the brachiocephalic or subclavian arteries. CAROTID ARTERIES: Right side:  Right common carotid artery is patent without focal stenosis. Atherosclerotic plaque in the right carotid bulb and proximal internal carotid artery results in about 50% stenosis by NASCET criteria. Mid segment of the right ICA is partially obscured by motion artifact. Left side:  Left common carotid artery is patent without focal stenosis. Calcified atherosclerotic plaque in the left carotid bulb and proximal internal carotid artery results in about 50% stenosis by NASCET criteria. Remainder of the left internal carotid artery is patent without significant stenosis. VERTEBRAL ARTERIES: No dissection, arterial injury, or significant stenosis. Left vertebral artery is dominant. SOFT TISSUES: Lung apices are clear. No cervical or superior mediastinal lymphadenopathy. Larynx and pharynx are unremarkable. No acute abnormality of the salivary and thyroid glands. Right thyroid lobe low-attenuation nodule measures 2.5 x 1.8 cm. BONES: No acute osseous abnormality. CTA HEAD: ANTERIOR CIRCULATION: No significant stenosis of the intracranial internal carotid, anterior cerebral, or middle cerebral arteries. No aneurysm. POSTERIOR CIRCULATION: No significant stenosis of the vertebral, basilar, or posterior cerebral arteries.  No patient and daughter. Both agreed to admission.     - incidental finding of thyroid nodule - can be followed up as outpatient. I spoke with Denisse Frey NP for hospitalist.  We thoroughly discussed the history, physical exam, laboratory and imaging studies, as well as, emergency department course. Based upon that discussion, we've decided to admit Varun Feliz for further observation and evaluation of Mac Beaulieu's dizziness, inability to walk, possible posterior circulation. As I have deemed necessary from their history, physical and studies, I have considered and evaluated Varun Feliz for the following diagnoses:  DIABETES, INTRACRANIAL HEMORRHAGE, MENINGITIS, SUBARACHNOID HEMORRHAGE, SUBDURAL HEMATOMA, & STROKE. Clinical Impression:  1. Dizziness    2. Thyroid nodule    3. Cannot walk        Disposition:  Admit to hospitalist in stable condition. Blood pressure (!) 141/79, pulse 57, temperature 97.5 °F (36.4 °C), resp. rate 14, weight 183 lb (83 kg), SpO2 96 %. This chart was generated in part by using Dragon Dictation system and may contain errors related to that system including errors in grammar, punctuation, and spelling, as well as words and phrases that may be inappropriate. If there are any questions or concerns please feel free to contact the dictating provider for clarification.      Keith Pulido MD  [address] [facility] Gabriella Corrales MD  01/12/20 7046

## 2020-01-12 NOTE — ED NOTES
[email or web address]   re:admit.dizziness, cannot walk per @[employer]cia returned [address]  01/12/20 8181

## 2020-01-12 NOTE — H&P
Hospital Medicine History & Physical      PCP: Jj Cuellar MD    Date of Admission: 1/12/2020    Date of Service: Pt seen/examined on 1/12/2020 and Admitted to Inpatient with expected LOS greater than two midnights due to medical therapy. Chief Complaint:    Chief Complaint   Patient presents with    Dizziness     not able to walk since 10pm last night           History Of Present Illness:    68 y.o. male who presented to [facility] with c/o difficulty walking. According to Mr Angle Toure, he had an episode of tinnitus, followed by the sensation of the room spinning, last night around 10pm. This resolved prior to bed. Upon waking this AM, he reports profound ataxia. He describes it as\" feeling drunk \", and being unable to walk upright. He came to the ED for Further evaluation. He denies spinning sensation. He does report that he had be diagnosed with vertigo last summer, after a similar episode. He has not had it since then. In the ED, code stroke was called. He was last known \"normal\" at 10pm, therefore he was out of the TPA window. CT head was obtained and failed to show abnormalities c/w his symptoms. It was recommended that he be admitted for further workup. Pt Seen/Examined and Chart Reviewed. Admitting dx: Ataxia.    Past Medical History:          Diagnosis Date    Allergic rhinitis     Atrial fibrillation (HCC)     Cancer (HCC)     Basal Cell cancer on right forehead    Chronic LBP     Diabetes mellitus ([address]     Non-insulin dependent    Erectile dysfunction 4/6/2012    History of blood transfusion     Hypercholesteremia     Hypertension     Kidney stone on left side 5/2/2012    Type II or unspecified type diabetes mellitus without mention of complication, not stated as uncontrolled     Wears glasses        Past Surgical History:          Procedure Laterality Date    APPENDECTOMY Right 01/01/1955    CARDIAC SURGERY      COLONOSCOPY      SKIN BIOPSY Right 01/01/2007 forehead- cancer       Medications Prior to Admission:      Prior to Admission medications    Medication Sig Start Date End Date Taking? Authorizing Provider   flecainide (TAMBOCOR) 100 MG tablet TAKE 1 TABLET EVERY DAY 11/25/19   KESHIA Santiago CNP   warfarin (COUMADIN) 5 MG tablet TAKE 1 TABLET ONE TIME DAILY  OR AS DIRECTED  BY  CLINIC 10/29/19   KESHIA Santiago CNP   metoprolol succinate (TOPROL XL) 50 MG extended release tablet Take 1 tablet by mouth daily 9/17/19   KESHIA Santiago CNP   metFORMIN (GLUCOPHAGE) 1000 MG tablet TAKE 1 TABLET TWICE DAILY WITH MEALS 3/2/19   KESHIA Santiago CNP   HYDROcodone-acetaminophen ([address] [facility]) 7.5-325 MG per tablet TAKE ONE TABLET BY MOUTH EVERY 4-6 HOURS AS NEEDED FOR PAIN. MAXIMUM 5 per day. . 3/3/17   Lilia Sam MD   fenofibrate 160 MG tablet TAKE 1 TABLET ONE TIME DAILY 1/9/17   Lilia Sam MD   losartan-hydrochlorothiazide [facility]) 100-12.5 MG per tablet TAKE 1 TABLET EVERY DAY 12/8/16   Lilia Sam MD   atorvastatin (LIPITOR) 40 MG tablet TAKE 1 TABLET BY MOUTH ONE TIME A DAY 11/17/16   Lilia Sam MD   Multiple Vitamins-Minerals (THERAPEUTIC MULTIVITAMIN-MINERALS) tablet Take 1 tablet by mouth daily. Historical Provider, MD   Cholecalciferol (VITAMIN D) 2000 UNITS CAPS capsule Take  by mouth daily. Historical Provider, MD   vitamin B-12 (CYANOCOBALAMIN) 1000 MCG tablet Take 1,200 mcg by mouth daily. Historical Provider, MD       Allergies:  Patient has no known allergies. Social History:      The patient currently lives at home independently. TOBACCO:   reports that he quit smoking about 15 years ago. His smoking use included cigarettes. He has a 28.00 pack-year smoking history. He has never used smokeless tobacco.  ETOH:   reports current alcohol use of about 1.0 standard drinks of alcohol per week.       Family History:       Positive as follows:        Problem Relation Age of Onset    High Blood Pressure Mother     Stroke Mother     Diabetes Father     Heart Disease Brother        REVIEW OF SYSTEMS:   Pertinent positives as noted in the HPI. All other systems reviewed and negative. PHYSICAL EXAM PERFORMED:    /79   Pulse 56   Temp 98 °F (36.7 °C)   Resp 21   Wt 183 lb (83 kg)   SpO2 96%   BMI 29.54 kg/m²     General appearance:  No apparent distress, appears stated age and cooperative. HEENT:  Normal cephalic, atraumatic without obvious deformity. Pupils equal, round, and reactive to light. Extra ocular muscles intact. Conjunctivae/corneas clear. Neck: Supple, with full range of motion. No jugular venous distention. Trachea midline. Respiratory:  Normal respiratory effort. Clear to auscultation, bilaterally without Rales/Wheezes/Rhonchi. Cardiovascular:  Regular rate and rhythm with normal S1/S2 without murmurs, rubs or gallops. Abdomen: Soft, non-tender, non-distended with normal bowel sounds. Musculoskeletal:  No clubbing, cyanosis or edema bilaterally. Full range of motion without deformity. Skin: Skin color, texture, turgor normal.  No rashes or lesions.   Neurologic: +nystagmus, bilateral,fine motor tremor   Psychiatric:  Alert and oriented, thought content appropriate, normal insight  Capillary Refill: Brisk,< 3 seconds   Peripheral Pulses: +2 palpable, equal bilaterally       Labs:     Recent Labs     01/12/20  0715   WBC 7.6   HGB 14.2   HCT 42.3        Recent Labs     01/12/20  0715      K 3.7      CO2 25   BUN 21*   CREATININE 0.9   CALCIUM 9.4     Recent Labs     01/12/20  0715   AST 26   ALT 26   BILITOT 0.5   ALKPHOS 33*     Recent Labs     01/12/20  0715   INR 1.51*     Recent Labs     01/12/20  0715   TROPONINI <0.01       Urinalysis:      Lab Results   Component Value Date    NITRU Negative 01/12/2020    BLOODU Negative 01/12/2020    SPECGRAV 1.010 01/12/2020    GLUCOSEU Negative 01/12/2020       Radiology:         XR CHEST PORTABLE   Final Result   Clear appearing lungs. No acute abnormality. Lung volumes somewhat low. CTA Head W Con And CTA Neck W Con   Preliminary Result   1. About 50% stenosis of the internal carotid arteries bilaterally by NASCET   criteria. 2. Otherwise, no additional hemodynamically significant stenosis or branch   occlusion in the cervical or intracranial arterial circulation. 3. Right thyroid lobe nodule measures 2.5 cm. Follow-up imaging and   management per criteria listed below is recommended. RECOMMENDATIONS:   2.5 cm incidental thyroid nodule. Recommend thyroid US. Reference: J Am Inocente Radiol. 2015 Feb;12(2): 143-50         CT Head WO Contrast   Final Result   Atrophy and moderate small vessel ischemic disease. Paranasal sinus mucosal thickening. Findings were discussed with Balwinder Issa at 07:43 on 1/12/2020. ASSESSMENT:    Active Hospital Problems    Diagnosis Date Noted    Vertigo [R42] 01/12/2020         PLAN:    1. Vertigo and Ataxia, Acute, concerning for posterior circulation CVA   Last known normal ar 10p, 1/11/2020;   CT Head 1/12/2020:. About 50% stenosis of the internal carotid arteries bilaterally by NASCET criteria, Otherwise, no additional hemodynamically significant stenosis or branch occlusion in the cervical or intracranial arterial circulation.   -Will add MRI, Echo, TSH, HgA1c,Urine Tox  -Neuro checks per unit routine, PT/OT eval  -Consult neurology    2. Type 2 diabetes mellitus without complication, without long-term current use of insulin    - On  Metformin 1000 mg bid. Will hold while inpatient. Pending repeat  HgA1c as above. Accuchecks ACHS with LSSI while inpatient. 3.. Essential hypertension - Controlled outpt,  - slightly elevated on admission. Will hold  Losartan pending MRI, continue Metoprolol    3. Permanent atrial fibrillation -stable  - followed by Dr. Mercedes Inman. Continue Comadin , Rx to dose. Slightly subtherapuetic on admission 1.7.   -Monitor INR daily.

## 2020-01-12 NOTE — ED NOTES
Called  stroke team @[phone]  Re: stroke like symptoms per Peyton Razo returned call @[phone]     Elliot Pickles Naegele  01/12/20 2228

## 2020-01-12 NOTE — PROGRESS NOTES
Physical Therapy    Facility/Department: [facility] C3 TELE/MED SURG/ONC  Initial Assessment    NAME: Varun Feliz  : 1950  MRN: [id number]    Date of Service: 2020    Discharge Recommendations:  24 hour supervision or assist, Outpatient PT   PT Equipment Recommendations  Equipment Needed: No    Assessment   Body structures, Functions, Activity limitations: Decreased functional mobility ; Decreased balance;Decreased endurance  Assessment: Pt evaluated this PM for gait training. Pt cleared per RN, pt agreeable to therapy. Pt admitted for vertigo. Pt reports he had previous incident awhile back of vertigo that resolved on its own and reports his brother has also had vertigo and saw improvements with OP therapy. Pt ambulated 1x 100 ft and 1x 300 ft following seated rest without AD with CGA progressing to SBA with mild unsteadiness but no overt LOB. Pt demonstrated improved balance on second bout of ambulation. Pt reports mild dizziness throughout both bouts. Pt reports he has 4 daughters who will collectively be able to provide 24/7 assistance upon d/c. PT will continue to follow throughout LOS for balance training. Recommending d/c to home with 24/7 assistance d/t fall risk and OP PT if unsteadiness and symptoms persist.  Treatment Diagnosis: impaired balance with gait  Specific instructions for Next Treatment: progress balance, consider trialing cane for steadying  Prognosis: Excellent  Decision Making: Low Complexity  PT Education: Goals;Precautions;Orientation; Functional Mobility Training;PT Role;Transfer Training;Family Education;Plan of Care;Gait Training;General Safety  Patient Education: pt verbalized understanding  Barriers to Learning: none  REQUIRES PT FOLLOW UP: Yes  Activity Tolerance  Activity Tolerance: Patient Tolerated treatment well;Patient limited by fatigue       Patient Diagnosis(es): The primary encounter diagnosis was Dizziness.  Diagnoses of Thyroid nodule and Cannot walk were also

## 2020-01-13 VITALS
HEART RATE: 66 BPM | WEIGHT: 183 LBS | OXYGEN SATURATION: 95 % | RESPIRATION RATE: 16 BRPM | BODY MASS INDEX: 27.11 KG/M2 | TEMPERATURE: 97.8 F | DIASTOLIC BLOOD PRESSURE: 73 MMHG | SYSTOLIC BLOOD PRESSURE: 108 MMHG | HEIGHT: 69 IN

## 2020-01-13 LAB
CHOLESTEROL, TOTAL: 134 MG/DL (ref 0–199)
ESTIMATED AVERAGE GLUCOSE: 125.5 MG/DL
ESTIMATED AVERAGE GLUCOSE: 128.4 MG/DL
GLUCOSE BLD-MCNC: 105 MG/DL (ref 70–99)
GLUCOSE BLD-MCNC: 109 MG/DL (ref 70–99)
GLUCOSE BLD-MCNC: 123 MG/DL (ref 70–99)
HBA1C MFR BLD: 6 %
HBA1C MFR BLD: 6.1 %
HCT VFR BLD CALC: 40.2 % (ref 40.5–52.5)
HDLC SERPL-MCNC: 29 MG/DL (ref 40–60)
HEMOGLOBIN: 13.6 G/DL (ref 13.5–17.5)
INR BLD: 1.66 (ref 0.86–1.14)
LDL CHOLESTEROL CALCULATED: 71 MG/DL
MCH RBC QN AUTO: 30.8 PG (ref 26–34)
MCHC RBC AUTO-ENTMCNC: 33.8 G/DL (ref 31–36)
MCV RBC AUTO: 91.2 FL (ref 80–100)
PDW BLD-RTO: 13.6 % (ref 12.4–15.4)
PERFORMED ON: ABNORMAL
PLATELET # BLD: 206 K/UL (ref 135–450)
PMV BLD AUTO: 8.9 FL (ref 5–10.5)
PROTHROMBIN TIME: 19.3 SEC (ref 10–13.2)
RBC # BLD: 4.41 M/UL (ref 4.2–5.9)
TRIGL SERPL-MCNC: 168 MG/DL (ref 0–150)
TSH REFLEX: 0.98 UIU/ML (ref 0.27–4.2)
VITAMIN B-12: >2000 PG/ML (ref 211–911)
VITAMIN D 25-HYDROXY: 39.5 NG/ML
VLDLC SERPL CALC-MCNC: 34 MG/DL
WBC # BLD: 6.2 K/UL (ref 4–11)

## 2020-01-13 PROCEDURE — 97116 GAIT TRAINING THERAPY: CPT

## 2020-01-13 PROCEDURE — 99223 1ST HOSP IP/OBS HIGH 75: CPT | Performed by: PSYCHIATRY & NEUROLOGY

## 2020-01-13 PROCEDURE — 80061 LIPID PANEL: CPT

## 2020-01-13 PROCEDURE — 85027 COMPLETE CBC AUTOMATED: CPT

## 2020-01-13 PROCEDURE — 83036 HEMOGLOBIN GLYCOSYLATED A1C: CPT

## 2020-01-13 PROCEDURE — 6370000000 HC RX 637 (ALT 250 FOR IP): Performed by: NURSE PRACTITIONER

## 2020-01-13 PROCEDURE — 85610 PROTHROMBIN TIME: CPT

## 2020-01-13 PROCEDURE — 92610 EVALUATE SWALLOWING FUNCTION: CPT

## 2020-01-13 PROCEDURE — 93306 TTE W/DOPPLER COMPLETE: CPT

## 2020-01-13 PROCEDURE — 97165 OT EVAL LOW COMPLEX 30 MIN: CPT

## 2020-01-13 PROCEDURE — 6370000000 HC RX 637 (ALT 250 FOR IP): Performed by: INTERNAL MEDICINE

## 2020-01-13 PROCEDURE — 36415 COLL VENOUS BLD VENIPUNCTURE: CPT

## 2020-01-13 RX ADMIN — INSULIN LISPRO 7 UNITS: 100 INJECTION, SOLUTION INTRAVENOUS; SUBCUTANEOUS at 13:05

## 2020-01-13 RX ADMIN — INSULIN LISPRO 7 UNITS: 100 INJECTION, SOLUTION INTRAVENOUS; SUBCUTANEOUS at 09:03

## 2020-01-13 RX ADMIN — ASPIRIN 81 MG: 81 TABLET, COATED ORAL at 09:01

## 2020-01-13 RX ADMIN — METOPROLOL SUCCINATE 50 MG: 50 TABLET, EXTENDED RELEASE ORAL at 09:01

## 2020-01-13 RX ADMIN — FENOFIBRATE 160 MG: 160 TABLET ORAL at 09:01

## 2020-01-13 ASSESSMENT — PAIN SCALES - GENERAL
PAINLEVEL_OUTOF10: 0
PAINLEVEL_OUTOF10: 0

## 2020-01-13 NOTE — PROGRESS NOTES
Occupational Therapy   Occupational Therapy Initial Assessment and Treatment  Date: 2020   Patient Name: Good Vila  MRN: [id number]     : 1950    Date of Service: 2020    Discharge Recommendations:   Home independently  OT Equipment Recommendations  Equipment Needed: No    Assessment    After evaluation and treatment, pt is presenting with the ability to perform all ADL and functional mobility at the SBA level or higher. Pt with no further acute OT or equipment needs, thus d/c pt. Rec d/c home with family assist prn. Prognosis: Good  Decision Making: Low Complexity  No Skilled OT: Independent with functional mobility; Independent with ADL's;At baseline function; Safe to return home; No OT goals identified  REQUIRES OT FOLLOW UP: No  Activity Tolerance  Activity Tolerance: Patient Tolerated treatment well  Safety Devices  Safety Devices in place: Yes  Type of devices: Left in chair;Nurse notified;Call light within reach         Patient Diagnosis(es): The primary encounter diagnosis was Dizziness. Diagnoses of Thyroid nodule and Cannot walk were also pertinent to this visit. has a past medical history of Allergic rhinitis, Atrial fibrillation ([address] Cancer ([address] Chronic LBP, Diabetes mellitus ([address] Erectile dysfunction, History of blood transfusion, Hypercholesteremia, Hypertension, Kidney stone on left side, Type II or unspecified type diabetes mellitus without mention of complication, not stated as uncontrolled, and Wears glasses. has a past surgical history that includes Appendectomy (Right, 1955); skin biopsy (Right, 2007); Colonoscopy; and Cardiac surgery.         Restrictions  Restrictions/Precautions  Restrictions/Precautions: Up as Tolerated, Fall Risk  Position Activity Restriction  Other position/activity restrictions: telemetry, up as tolerated    Subjective   General  Chart Reviewed: Yes  Patient assessed for rehabilitation services?: Yes  Family / Caregiver Present: No  Referring Practitioner: KESHIA Contreras CNP     Patient Currently in Pain: Denies    Social/Functional History  Social/Functional History  Lives With: Alone  Type of Home: Apartment  Home Access: Stairs to enter without rails  Entrance Stairs - Number of Steps: 1  Bathroom Shower/Tub: Tub/Shower unit  Bathroom Toilet: Standard  Home Equipment: (None)  ADL Assistance: Independent  Homemaking Assistance: Independent  Homemaking Responsibilities: Yes  Ambulation Assistance: Independent  Transfer Assistance: Independent  Active : Yes  Occupation: Retired  Leisure & Hobbies: carpentry work       Objective   Vision: Impaired  Vision Exceptions: Wears glasses for distance  Hearing: Within functional limits      Orientation  Overall Orientation Status: Within Functional Limits        Balance  Sitting Balance: Independent  Standing Balance: Independent  Functional Mobility  Functional - Mobility Device: No device  Activity: To/from bathroom  Assist Level: Independent  Toilet Transfers  Toilet - Technique: Ambulating  Equipment Used: Standard toilet  Toilet Transfer: Independent     ADL  Grooming: Independent(to wash hands standing)  LE Dressing: Independent(to don/doff B socks seated)     Tone RUE  RUE Tone: Normotonic  Tone LUE  LUE Tone: Normotonic  Coordination  Movements Are Fluid And Coordinated: Yes        Bed mobility  Supine to Sit: Independent  Sit to Supine: Independent  Scooting: Independent  Comment: HOB flat, no rail     Transfers  Sit to stand: Independent  Stand to sit: Independent  Transfer Comments: Tub t/f simulation and simulation of item retrieval off floor with independence        Cognition  Overall Cognitive Status: WFL           Sensation  Overall Sensation Status: WFL(per pt report)                       BUE strength and AROM are WFL based on functional presentation.     Education: Role of OT, safe t/f training, safe use of DME, awareness of deficits, discharge planning, ADL as therapeutic exercise, importance of OOB        Plan   Plan  Times per week: Eval and d/c    AM-PAC Score        AM-PAC Inpatient Daily Activity Raw Score: 24 (01/13/20 1524)  AM-PAC Inpatient ADL T-Scale Score : 57.54 (01/13/20 1524)  ADL Inpatient CMS 0-100% Score: 0 (01/13/20 1524)  ADL Inpatient CMS G-Code Modifier : [facility] (01/13/20 1524)    Goals  Patient Goals   Patient goals : \"Get home. \"       Therapy Time   Individual Concurrent Group Co-treatment   Time In 1500         Time Out 1819         Minutes 14         Timed Code Treatment Minutes: Jagruti 1, OTR/L [id number]

## 2020-01-13 NOTE — PROGRESS NOTES
Pharmacy to dose Warfarin     DX: Afib  Goal INR: 2-3  Home regimen per [facility]: 5 mg PO daily except: 2.5 mg PO M-W-F. Last updated note 1/6/20. Date      INR       Warfarin  1/12      1.51       5 mg  1/13      1.66       7.5 mg     Recommend giving 7.5 mg PO X1 dose tonight. Daily INR ordered.   Pallavi Drake PharmD  1/13/2020 at 9:20 AM

## 2020-01-13 NOTE — PROGRESS NOTES
Physical Therapy  Facility/Department: [facility] C3 TELE/MED SURG/ONC  Daily Treatment Note  NAME: Eriberto Sutherland  : 1950  MRN: [id number]    Date of Service: 2020    Discharge Recommendations:  Outpatient PT, Home independently   PT Equipment Recommendations  Equipment Needed: No    Assessment    Pt has progressed very well transfers and gait training are safe and steady without a LOB. Managed the stairs safely without sequencing cues or a LOB. No dizziness noted with dynamic head movement during gait training, transfers or stair management. Spent time educating pt on the benefits of OP therapy. PT Education: Goals;Precautions;Orientation; Functional Mobility Training;PT Role;Transfer Training;Family Education;Plan of Care;Gait Training;General Safety  Patient Education: pt verbalized understanding  Barriers to Learning: none  REQUIRES PT FOLLOW UP: Yes     Patient Diagnosis(es): The primary encounter diagnosis was Dizziness. Diagnoses of Thyroid nodule and Cannot walk were also pertinent to this visit. has a past medical history of Allergic rhinitis, Atrial fibrillation ([address] Cancer ([facility] [address] Chronic LBP, Diabetes mellitus ([facility] [address] Erectile dysfunction, History of blood transfusion, Hypercholesteremia, Hypertension, Kidney stone on left side, Type II or unspecified type diabetes mellitus without mention of complication, not stated as uncontrolled, and Wears glasses. has a past surgical history that includes Appendectomy (Right, 1955); skin biopsy (Right, 2007); Colonoscopy; and Cardiac surgery.     Restrictions  Restrictions/Precautions  Restrictions/Precautions: Up as Tolerated, Fall Risk  Position Activity Restriction  Other position/activity restrictions: telemetry, up as tolerated  Subjective   General  Chart Reviewed: Yes  Response To Previous Treatment: Not applicable  Referring Practitioner: Sheyla Cleary  Subjective  Subjective: Pt sitting up willing to participate   Pain Screening  Patient Currently in Pain: No  Vital Signs  Patient Currently in Pain: No       Orientation  Orientation  Overall Orientation Status: Within Normal Limits  Objective   Transfers  Sit to Stand: Independent  Stand to sit: Independent  Ambulation  Ambulation?: Yes  Ambulation 1  Surface: level tile  Device: No Device  Assistance: Independent  Quality of Gait: quick rivera reciprocal patten with a decreased stride length. Distance: 120 ft   Stairs/Curb  Stairs?: Yes  Stairs  # Steps : 12  Stairs Height: 6\"  Rails: Right ascending  Assistance: Modified independent      Balance  Sitting - Static: Good  Sitting - Dynamic: Good  Standing - Static: Good  Standing - Dynamic: Good;-    AM-PAC Score  AM-PAC Inpatient Mobility Raw Score : 24 (01/13/20 1205)  AM-PAC Inpatient T-Scale Score : 61.14 (01/13/20 1205)  Mobility Inpatient CMS 0-100% Score: 0 (01/13/20 1205)  Mobility Inpatient CMS G-Code Modifier : [address] (01/13/20 1205)          Goals  Short term goals  Time Frame for Short term goals: 1/17/20  Short term goal 1: Pt will complete bed mobility independently. Short term goal 2: Pt will complete transfers independently. Short term goal 3: Pt will ambulate 500 ft independently without LOB. Short term goal 4: Pt will climb 1 step independently. Patient Goals   Patient goals : \"improve balance and dizziness\"    Plan    Plan  Times per week: 3-5x/week  Plan weeks: 1/19/20  Specific instructions for Next Treatment: progress balance, consider trialing cane for steadying  Current Treatment Recommendations: Strengthening, Transfer Training, Endurance Training, Patient/Caregiver Education & Training, Balance Training, Gait Training, Home Exercise Program, Functional Mobility Training, Stair training, Safety Education & Training  Safety Devices  Type of devices:  All fall risk precautions in place, Gait belt, Patient at risk for falls, Nurse notified, Call light within reach, Left in chair, Chair alarm in place  Restraints  Initially in

## 2020-01-13 NOTE — PROGRESS NOTES
Pt. Walked four laps around the unit without any dissiness. Pt. Is back in room. No neuro changes at this time. In chair. Continue to monitor.

## 2020-01-13 NOTE — PLAN OF CARE
Pt A&Ox4. Pt appropriately calls out for assistance. Nonskid slippers in use. Bed locked and in lowest position. Bed alarm is on. Call light and personal belongings within reach. Pt has remained free from falls this shift. Will continue to monitor.

## 2020-01-13 NOTE — CARE COORDINATION
CASE MANAGEMENT INITIAL ASSESSMENT      Reviewed chart and met with patient today, re: 68 y.o. male   Explained Case Management role/services. Family present: none  Primary contact information: Empire Phlegm    Admit date/status: 1/12/20  Diagnosis: vertigo  Is this a Readmission?: n     Insurance: [place] [id number] required for SNF - Y       3 night stay required - N    Living arrangements, Adls, care needs, prior to admission: lives alone in apartment 1 step entry    Transportation: private    [facility] [employer] [place] at home:none Walker__Cane__RTS__ BSC__Shower Chair__  02__ HHN__ CPAP__  BiPap__  Hospital Bed__ W/C___ Other__________    Services in the home and/or outpatient, prior to admission: none    Dialysis Facility (if applicable)  none  · Name:  · Address:  · Dialysis Schedule:  · Phone:  · Fax:    PT/OT recs: OP therapy    Hospital Exemption Notification (HEN): none    Barriers to discharge: none     Plan/comments: patient states lives in apartment alone 1 step entry. Is normally IPT[employer] and drives. Therapy recs for OP rehab. Will continue to follow.  Kimberley Cox, RN      ECOC on chart for MD signature

## 2020-01-13 NOTE — CONSULTS
 Chronic LBP     Diabetes mellitus ([facility] [address]     Non-insulin dependent    Erectile dysfunction 4/6/2012    History of blood transfusion     Hypercholesteremia     Hypertension     Kidney stone on left side 5/2/2012    Type II or unspecified type diabetes mellitus without mention of complication, not stated as uncontrolled     Wears glasses      Social History     Tobacco Use    Smoking status: Former Smoker     Packs/day: 0.80     Years: 35.00     Pack years: 28.00     Types: Cigarettes     Last attempt to quit: 12/3/2004     Years since quitting: 15.1    Smokeless tobacco: Never Used   Substance Use Topics    Alcohol use:  Yes     Alcohol/week: 1.0 standard drinks     Types: 1 Cans of beer per week     Comment: 1 beer a month    Drug use: No     No Known Allergies  Current Facility-Administered Medications   Medication Dose Route Frequency Provider Last Rate Last Dose    apixaban (ELIQUIS) tablet 5 mg  5 mg Oral BID KESHIA Fabian CNP        atorvastatin (LIPITOR) tablet 40 mg  40 mg Oral Nightly Margaret Officer, APRN - CNP   40 mg at 01/12/20 2149    fenofibrate tablet 160 mg  457 mg Oral Daily Margaret Officer, APRN - CNP   160 mg at 01/13/20 0901    HYDROcodone-acetaminophen (Johana Lark) 7.5-325 MG per tablet 1 tablet  1 tablet Oral L2Z PRJAXSON Robles Officer, APRN - CNP        metoprolol succinate (TOPROL XL) extended release tablet 50 mg  50 mg Oral Daily Margaret Officer, KESHIA - CNP   50 mg at 01/13/20 0901    sodium chloride flush 0.9 % injection 10 mL  10 mL Intravenous 2 times per day Margaret Mcraer, APRN - CNP   10 mL at 01/12/20 2150    sodium chloride flush 0.9 % injection 10 mL  10 mL Intravenous PRN Margaret Officer, KESHIA - CNP        magnesium hydroxide (MILK OF MAGNESIA) 400 MG/5ML suspension 30 mL  30 mL Oral Daily PRN Margaret Mcraer, APRN - CNP        ondansetron (ZOFRAN) injection 4 mg  4 mg Intravenous S6R PRJAXSON Robles Officer, APRN - CNP        aspirin EC tablet 81 mg  81 mg Oral Daily KESHIA Camacho CNP   81 mg at 01/13/20 0901    Or    aspirin suppository 300 mg  300 mg Rectal Daily KESHIA Camacho CNP        perflutren lipid microspheres (DEFINITY) injection 1.65 mg  1.5 mL Intravenous ONCE PRN KESHIA Camacho CNP        insulin lispro (HUMALOG) injection vial 0-12 Units  0-12 Units Subcutaneous TID BEAN Pascual MD   2 Units at 01/12/20 1800    insulin lispro (HUMALOG) injection vial 0-6 Units  0-6 Units Subcutaneous Nightly Nisreen Pascual MD        glucose (GLUTOSE) 40 % oral gel 15 g  15 g Oral PRN Nisreen Pascual MD        dextrose 50 % IV solution  12.5 g Intravenous PRN Nisreen Pascual MD        glucagon (rDNA) injection 1 mg  1 mg Intramuscular PRN Nisreen Pascual MD        dextrose 5 % solution  100 mL/hr Intravenous PRN Nisreen Pascual MD        insulin lispro (HUMALOG) injection vial 7 Units  0.08 Units/kg Subcutaneous TID  Nisreen Pascual MD   7 Units at 01/13/20 1305    insulin glargine (LANTUS) injection vial 12 Units  0.15 Units/kg Subcutaneous Nightly Nisreen Pascual MD   12 Units at 01/12/20 2150       ROS : A 10-14 system review of constitutional, cardiovascular, respiratory, eyes, musculoskeletal, endocrine, GI, ENT, skin, hematological, genitourinary, psychiatric and neurologic systems was obtained and updated today and is unremarkable except as mentioned in my HPI      Exam:     Constitutional:   Vitals:    01/13/20 0845 01/13/20 0900 01/13/20 1115 01/13/20 1403   BP: 115/72  106/71 108/73   Pulse: 54 66 66    Resp: 20  20 16   Temp: 97.4 °F (36.3 °C)  97.8 °F (36.6 °C) 97.8 °F (36.6 °C)   TempSrc: Oral  Oral Oral   SpO2:   96% 95%   Weight:       Height:           General appearance:  Normal development and appear in no acute distress. Eye: No icterus. Fundus: No blurring of optic disc. Neck: supple  Cardiovascular: No carotid bruit.          No lower leg edema with good

## 2020-01-13 NOTE — PROGRESS NOTES
Shift assessment completed per documentation. Pt A&Ox4. VSS. Pt awake sitting up in rosi. Chair alarm on. Nonskid slippers on. Call light and personal belongings within reach. Will continue to monitor.

## 2020-01-13 NOTE — PROGRESS NOTES
Grossly timely swallow initiation noted, no overt s/s of aspiration/penetration throughout. Pt's O2 sats 97-98% throughout BSE. Swallow function appears grossly WFL. Recommend continuing pt's Regular solid diet with thin liquids. Further ST not indicated; please re-refer ST if changes occur. Treatment Plan  Requires SLP Intervention: No  Duration/Frequency of Treatment: Further ST not indicated  D/C Recommendations: Home independently(Further ST not indicated)  Therapeutic Interventions: Patient/Family education    Compensatory Swallowing Strategies  Compensatory Swallowing Strategies: Small bites/sips;Upright as possible for all oral intake;Remain upright for 30-45 minutes after meals    Treatment/Goals: Further ST not indicated    General  Chart Reviewed: Yes  Comments: Pt admitted d/t vertigo. Behavior/Cognition: Alert; Cooperative;Pleasant mood  Respiratory Status: Room air  O2 Device: None (Room air)  Communication Observation: Functional  Follows Directions: Simple  Dentition: Adequate  Patient Positioning: Upright in chair  Baseline Vocal Quality: Normal  Volitional Cough: Strong  Prior Dysphagia History: No hx of dysphagia per chart review. Consistencies Administered: Reg solid; Thin - cup; Thin - straw    Vision/Hearing  Vision  Vision: Impaired  Vision Exceptions: Wears glasses for distance  Hearing  Hearing: Within functional limits    Oral Motor Deficits  Oral/Motor  Oral Motor: Within functional limits    Oral Phase Dysfunction  Oral phase of swallow grossly appears WNL. No oral phase deficits noted during evaluation. Indicators of Pharyngeal Phase Dysfunction  Pharyngeal phase of swallow appears grossly WNL. No pharyngeal deficits noted during evaluation. Laryngeal elevation appears WFL based upon palpation of anterior neck during swallow. Vocal quality dry before and after po trials. Pharyngeal: Pt's O2 sats 97-98% throughout BSE.      Prognosis  Prognosis  Prognosis for safe diet

## 2020-01-13 NOTE — PLAN OF CARE
Problem: Nutrition  Intervention: Swallowing evaluation  Note:   Bedside swallow evaluation completed this date. Mireya Monson M.S. [id number] [facility]  Speech-language pathologist  [id number]      Intervention: Aspiration precautions  Note:   Bedside swallow evaluation completed this date.     Mireya Monson M.S. [address] [facility]  Speech-language pathologist  [phone]

## 2020-01-14 ENCOUNTER — CARE COORDINATION (OUTPATIENT)
Dept: CASE MANAGEMENT | Age: 70
End: 2020-01-14

## 2020-01-14 NOTE — CARE COORDINATION
[facility] Transitions Initial Follow Up Call    Call within 2 business days of discharge: Yes    Patient: Janine Beaulieu Patient : 1950   MRN: [id number]  Reason for Admission: Dizziness   Discharge Date: 20 RARS: Readmission Risk Score: 14      Last Discharge [address] [address]       Complaint Diagnosis Description Type Department Provider    20 Dizziness Dizziness . .. ED to Hosp-Admission (Discharged) (ADMITTED) Beatrice Alvarado MD; Terri Mayberry. .. Attempted to reach patient via phone for initial post hospital transition call. VM left stating purpose of call along with my contact information requesting a return call.           Follow Up  Future Appointments   Date Time Provider [facility]   2020  7:00 AM [address] [facility]   2020  9:30 AM KESHIA Sanchez - CHELE MONTEIRON, RN, [facility]  Care Transition Nurse   [phone]

## 2020-01-15 ENCOUNTER — CARE COORDINATION (OUTPATIENT)
Dept: CASE MANAGEMENT | Age: 70
End: 2020-01-15

## 2020-01-15 NOTE — CARE COORDINATION
[facility] Transitions Initial Follow Up Call    Call within 2 business days of discharge: Yes    Patient: Kady Beaulieu Patient : 1950   MRN: [id number]  Reason for Admission: Dizziness   Discharge Date: 20 RARS: Readmission Risk Score: 14      Last Discharge [address]       Complaint Diagnosis Description Type Department Provider    20 Dizziness Dizziness . .. ED to Hosp-Admission (Discharged) (ADMITTED) Alysha Shaffer MD; Veronica Sal. .. Spoke with: Inna Henry: VICENTA     Non-face-to-face services provided:  Obtained and reviewed discharge summary and/or continuity of care documents     Spoke with patient who stated he was doing pretty good since being home. Patient denied any dizziness or lightheadedness and reported he is getting around fairly well on his own. Patient stated he has his new prescription for eliquis filled and taking and has stopped the coumadin. Patient has apt with PCP on 2020. Denies needs at present time. Educated on the use of urgent care or physicians 24 hr access line if assistance is needed after hours. No further transition calls needed at this time.      Care Transitions 24 Hour Call    Do you have any ongoing symptoms?:  No  Do you have a copy of your discharge instructions?:  Yes  Do you have all of your prescriptions and are they filled?:  Yes  Have you been contacted by a [address]?:  No  Have you scheduled your follow up appointment?:  Yes  How are you going to get to your appointment?:  Car - family or friend to transport  Were you discharged with any Home Care or Post Acute Services:  No  Do you feel like you have everything you need to keep you well at home?:  Yes  Care Transitions Interventions                                 Follow Up  Future Appointments   Date Time Provider Aimee Salter   2020  7:00 True   2020  9:30 AM Ebenezer Walter APRN - CNP Lorena Pantoja BSN, RN, [facility]  Care Transition Nurse   [phone]

## 2020-01-15 NOTE — DISCHARGE SUMMARY
Hospital Medicine Discharge Summary    Patient ID: Soha Beaulieu      Patient's PCP: Albaro Downs MD    Admit Date: 1/12/2020     Discharge Date: 1/13/2020      Admitting Physician: Rubén Tomlinson MD     Discharge Physician: KESHIA Maloney - CNP     Discharge Diagnoses: Active Hospital Problems    Diagnosis    TIA involving basilar artery [G45.0]    Dyslipidemia [E78.5]    Dizziness [R42]    PAF (paroxysmal atrial fibrillation) (HCC) [I48.0]    HTN (hypertension), benign [I10]       The patient was seen and examined on day of discharge and this discharge summary is in conjunction with any daily progress note from day of discharge. Hospital Course:     68 y.o. male who presented to [facility] with c/o difficulty walking. According to Mr Hans Lo, he had an episode of tinnitus, followed by the sensation of the room spinning, last night around 10pm. This resolved prior to bed. Upon waking this AM, he reports profound ataxia. He describes it as \" feeling drunk,\" and being unable to walk upright. He denies spinning sensation. He does report that he had be diagnosed with vertigo last summer, after a similar episode. He has not had it since then. In the ED, code stroke was called. He was last known \"normal\" at 10pm, therefore he was out of the TPA window. CT head was obtained and failed to show abnormalities c/w his symptoms. He was admitted for further workup. Vertigo and Ataxia - acute CVA ruled out. Could be due to possible TIA vs BPPV. - CTH negative. - CTA Head and neck - about 50% stenosis of the internal carotid arteries bilaterally by NASCET criteria, Otherwise, no additional hemodynamically significant stenosis or branch occlusion in the cervical or intracranial arterial circulation.   - MRI brain negative for acute infarct. - Neurology consulted. - ECHO - EF 55% with normal diastolic filling pressure. Bubble study was negative.   - LDL 71  - A1c 6.1  - continue home statin.     Type 2 diabetes mellitus without complication, without long-term current use of insulin    - resume home metformin     Essential hypertension - continue home meds. Permanent atrial fibrillation, rate controlled. - followed by Dr. Laci Call. - continue home BB, flecainide.  - discussed with patient (and daughter who is a nurse practitioner) about converting patient to Eliquis from warfarin, given subtherapeutic INR. Cost would be $47 monthly, which patient can afford. Ok with Mathew Brothers, NP with cardiology. - will stop warfarin and start Eliquis 5 mg BID. Informed of risks and benefits of DOAC. Mixed hyperlipidemia   - continue Lipitor and Fenofibrate       Chronic midline low back pain without sciatica   - continue Norco 7.5 mg-325 mg          Physical Exam Performed:     /73   Pulse 66   Temp 97.8 °F (36.6 °C) (Oral)   Resp 16   Ht 5' 9\" (1.753 m)   Wt 183 lb (83 kg)   SpO2 95%   BMI 27.02 kg/m²       General appearance:  No apparent distress, appears stated age and cooperative. HEENT:  Normal cephalic, atraumatic without obvious deformity. Pupils equal, round, and reactive to light. Extra ocular muscles intact. Conjunctivae/corneas clear. Neck: Supple, with full range of motion. No jugular venous distention. Trachea midline. Respiratory:  Normal respiratory effort. Clear to auscultation, bilaterally without Rales/Wheezes/Rhonchi. Cardiovascular:  Irregularly irregular with normal S1/S2 without murmurs, rubs or gallops. Abdomen: Soft, non-tender, non-distended with normal bowel sounds. Musculoskeletal:  No clubbing, cyanosis or edema bilaterally. Full range of motion without deformity. Skin: Skin color, texture, turgor normal.  No rashes or lesions. Neurologic:  Neurovascularly intact without any focal sensory/motor deficits.  Cranial nerves: II-XII intact, grossly non-focal.  Psychiatric:  Alert and oriented, thought content appropriate, normal insight  Capillary Discharge: Stable    Discharge Instructions/Follow-up:  F/u with PCP in 1-2 weeks. Should discuss with PCP and/or cardiology regarding the addition of ASA. Code Status:  Full    Activity: activity as tolerated    Diet: diabetic diet      Discharge Medications:     Discharge Medication List as of 1/13/2020  3:41 PM           Details   apixaban (ELIQUIS) 5 MG TABS tablet Take 1 tablet by mouth 2 times daily, Disp-60 tablet, R-0Normal              Details   flecainide (TAMBOCOR) 100 MG tablet TAKE 1 TABLET EVERY DAY, Disp-90 tablet, R-1Normal      metoprolol succinate (TOPROL XL) 50 MG extended release tablet Take 1 tablet by mouth daily, Disp-90 tablet, R-2Normal      metFORMIN (GLUCOPHAGE) 1000 MG tablet TAKE 1 TABLET TWICE DAILY WITH MEALS, Disp-180 tablet, R-3Adjust Sig      HYDROcodone-acetaminophen (NORCO) 7.5-325 MG per tablet TAKE ONE TABLET BY MOUTH EVERY 4-6 HOURS AS NEEDED FOR PAIN. MAXIMUM 5 per day. ., Disp-150 tablet, R-0Print      fenofibrate 160 MG tablet TAKE 1 TABLET ONE TIME DAILY, Disp-90 tablet, R-3      losartan-hydrochlorothiazide (HYZAAR) 100-12.5 MG per tablet TAKE 1 TABLET EVERY DAY, Disp-90 tablet, R-3      atorvastatin (LIPITOR) 40 MG tablet TAKE 1 TABLET BY MOUTH ONE TIME A DAY, Disp-90 tablet, R-3      Multiple Vitamins-Minerals (THERAPEUTIC MULTIVITAMIN-MINERALS) tablet Take 1 tablet by mouth daily. Cholecalciferol (VITAMIN D) 2000 UNITS CAPS capsule Take  by mouth daily. vitamin B-12 (CYANOCOBALAMIN) 1000 MCG tablet Take 1,200 mcg by mouth daily. Time Spent on discharge is more than 30 minutes in the examination, evaluation, counseling and review of medications and discharge plan. Signed:    KESHIA Deluna - CNP   1/15/2020      Thank you Tomi Chisholm MD for the opportunity to be involved in this patient's care. If you have any questions or concerns please feel free to contact me at [phone].

## 2020-01-16 ENCOUNTER — OFFICE VISIT (OUTPATIENT)
Dept: DERMATOLOGY | Age: 70
End: 2020-01-16
Payer: MEDICARE

## 2020-01-16 PROCEDURE — 17004 DESTROY PREMAL LESIONS 15/>: CPT | Performed by: DERMATOLOGY

## 2020-01-16 PROCEDURE — G8484 FLU IMMUNIZE NO ADMIN: HCPCS | Performed by: DERMATOLOGY

## 2020-01-16 PROCEDURE — G8427 DOCREV CUR MEDS BY ELIG CLIN: HCPCS | Performed by: DERMATOLOGY

## 2020-01-16 PROCEDURE — 4040F PNEUMOC VAC/ADMIN/RCVD: CPT | Performed by: DERMATOLOGY

## 2020-01-16 PROCEDURE — 99202 OFFICE O/P NEW SF 15 MIN: CPT | Performed by: DERMATOLOGY

## 2020-01-16 PROCEDURE — 3017F COLORECTAL CA SCREEN DOC REV: CPT | Performed by: DERMATOLOGY

## 2020-01-16 PROCEDURE — 1111F DSCHRG MED/CURRENT MED MERGE: CPT | Performed by: DERMATOLOGY

## 2020-01-16 PROCEDURE — 1123F ACP DISCUSS/DSCN MKR DOCD: CPT | Performed by: DERMATOLOGY

## 2020-01-16 PROCEDURE — 1036F TOBACCO NON-USER: CPT | Performed by: DERMATOLOGY

## 2020-01-16 PROCEDURE — 11102 TANGNTL BX SKIN SINGLE LES: CPT | Performed by: DERMATOLOGY

## 2020-01-16 PROCEDURE — G8417 CALC BMI ABV UP PARAM F/U: HCPCS | Performed by: DERMATOLOGY

## 2020-01-16 NOTE — PATIENT INSTRUCTIONS
Sun Protection Tips    Apply broad spectrum water resistant sunscreen with an SPF of at least 30 to exposed areas of the skin. Dont forget the ears and lips! Remember to reapply sunscreen about every 2 hours and after swimming or sweating. Wear sun protective clothing. Swim shirts (aka. rash guards) are a great idea and negates the need to reapply sunscreen in those areas. Seek the shade whenever possible especially between the hours of 10am and 4pm when the suns rays are the strongest.     Avoid tanning beds          Wear a wide brim hat while in the sun    Biopsy Wound Care Instructions   Cleanse the wound with mild soapy water daily.  Gently dry the area.  Apply Vaseline or petroleum jelly to the wound using a cotton tipped applicator or Qtip.  Cover with a clean bandage.  Repeat this process until the biopsy site is healed.  You may shower and bathe as usual.   ** Biopsy results generally take around 7 business days to come back. If you have not heard from us by then, please call the office at [phone] between 8AM and 4PM Monday through Friday. Cryosurgery (Freezing) Wound Care Instructions    AFTER THE PROCEDURE:    You will notice swelling and redness around the site. This is normal.    You may experience a sharp or sore feeling for the next several days. For this discomfort, you may take acetaminophen (Tylenol©).  A blister may develop at the treated area, sometimes as soon as by the end of the day. After several days, the blister will subside and a scab will form.  If the area is bumped or traumatized during the first few days following freezing, you may develop bleeding into the blister, forming a blood blister. This is nothing to be alarmed about.    If the blister is tense, uncomfortable, or much larger than the site that was frozen, you may pop the blister along its edge with a sterile needle (boiled, heated under a flame, or cleaned with alcohol) to allow the

## 2020-01-16 NOTE — PROGRESS NOTES
ago, forgets name of surgeon. Patient denies past history of melanoma, dysplastic nevi, or chronic skin rashes. PFHx: Denies hx of MM or NMSC    Family History   Problem Relation Age of Onset    High Blood Pressure Mother     Stroke Mother     Diabetes Father     Heart Disease Brother      Past Medical History:   Diagnosis Date    Allergic rhinitis     Atrial fibrillation ([address]     Cancer ([address]     Basal Cell cancer on right forehead    Chronic LBP     Diabetes mellitus ([address]     Non-insulin dependent    Erectile dysfunction 4/6/2012    History of blood transfusion     Hypercholesteremia     Hypertension     Kidney stone on left side 5/2/2012    Type II or unspecified type diabetes mellitus without mention of complication, not stated as uncontrolled     Wears glasses      Past Surgical History:   Procedure Laterality Date    APPENDECTOMY Right 01/01/1955    CARDIAC SURGERY      COLONOSCOPY      SKIN BIOPSY Right 01/01/2007    forehead- cancer       No Known Allergies  Outpatient Medications Marked as Taking for the 1/16/20 encounter (Office Visit) with Ted Carrero, DO   Medication Sig Dispense Refill    apixaban (ELIQUIS) 5 MG TABS tablet Take 1 tablet by mouth 2 times daily 60 tablet 0    flecainide (TAMBOCOR) 100 MG tablet TAKE 1 TABLET EVERY DAY 90 tablet 1    metoprolol succinate (TOPROL XL) 50 MG extended release tablet Take 1 tablet by mouth daily 90 tablet 2    metFORMIN (GLUCOPHAGE) 1000 MG tablet TAKE 1 TABLET TWICE DAILY WITH MEALS 180 tablet 3    HYDROcodone-acetaminophen (NORCO) 7.5-325 MG per tablet TAKE ONE TABLET BY MOUTH EVERY 4-6 HOURS AS NEEDED FOR PAIN. MAXIMUM 5 per day. . 150 tablet 0    fenofibrate 160 MG tablet TAKE 1 TABLET ONE TIME DAILY 90 tablet 3    losartan-hydrochlorothiazide (HYZAAR) 100-12.5 MG per tablet TAKE 1 TABLET EVERY DAY 90 tablet 3    atorvastatin (LIPITOR) 40 MG tablet TAKE 1 TABLET BY MOUTH ONE TIME A DAY 90 tablet 3    Multiple Vitamins-Minerals (THERAPEUTIC MULTIVITAMIN-MINERALS) tablet Take 1 tablet by mouth daily.  Cholecalciferol (VITAMIN D) 2000 UNITS CAPS capsule Take  by mouth daily.  vitamin B-12 (CYANOCOBALAMIN) 1000 MCG tablet Take 1,200 mcg by mouth daily. Social History:   Social History     Socioeconomic History    Marital status:      Spouse name: Not on file    Number of children: 3    Years of education: Not on file    Highest education level: Not on file   Occupational History    Not on file   Social Needs    Financial resource strain: Not on file    Food insecurity:     Worry: Not on file     Inability: Not on file    Transportation needs:     Medical: Not on file     Non-medical: Not on file   Tobacco Use    Smoking status: Former Smoker     Packs/day: 0.80     Years: 35.00     Pack years: 28.00     Types: Cigarettes     Last attempt to quit: 12/3/2004     Years since quitting: 15.1    Smokeless tobacco: Never Used   Substance and Sexual Activity    Alcohol use:  Yes     Alcohol/week: 1.0 standard drinks     Types: 1 Cans of beer per week     Comment: 1 beer a month    Drug use: No    Sexual activity: Yes     Partners: Female   Lifestyle    Physical activity:     Days per week: Not on file     Minutes per session: Not on file    Stress: Not on file   Relationships    Social connections:     Talks on phone: Not on file     Gets together: Not on file     Attends Sikh service: Not on file     Active member of club or organization: Not on file     Attends meetings of clubs or organizations: Not on file     Relationship status: Not on file    Intimate partner violence:     Fear of current or ex partner: Not on file     Emotionally abused: Not on file     Physically abused: Not on file     Forced sexual activity: Not on file   Other Topics Concern    Not on file   Social History Narrative    Not on file       Physical Examination     The following were examined and determined to be normal: Psych/Neuro, Conjunctivae/eyelids and Gums/teeth/lips. The following were examined and determined to be abnormal: Head/face, Neck and RUE, LUE (forearms and hands examined only)    Harmon phototype: 2    -General: A+Ox3, NAD, well-nourished, well-developed. Areas of skin examined as listed above:   1. ill defined irreg shaped gritty keratotic pink macule(s) located to superior helix of left ear, left dorsal forearm (4), left dorsal hand (7), right dorsal hand (4), right dorsal forearm (5)  2. Right dorsal hand- 2.0x1.9cm really bordered asymmetrical erythematous scaly plaque with hyperkeratotic scale centrally      3. Face, neck, dorsal hands/forearms-Scattered dyspigmentation with multiple lentigines and vascular change consistent with chronic sun exposure  4. Right lateral neck- 0.6x0.5cm yellowish subcutaneous mobile papule with 2 keratin plugged punctum   5. Right lateral forehead- no obvious scar, no evidence of recurrence of BCC    Assessment and Plan     1. Multiple actinic keratoses    2. Neoplasm of uncertain behavior    3. Photoaging of skin    4. History of basal cell carcinoma        1. Multiple actinic keratoses  -Edu re: relationship with chronic cumulative sun exposure, low premalignant potential.   -superior helix of left ear, left dorsal forearm (4), left dorsal hand (7), right dorsal hand (4), right dorsal forearm (5), 21 lesion(s) treated w/ liquid nitrogen x 1cycles, 3 seconds each located    Edu re: risk of blister formation, discomfort, scar, dyspigmentation. Discussed wound care. -Reviewed sun protective behavior -- sun avoidance during the peak hours of the day, sun-protective clothing (including hat and sunglasses), sunscreen use (water resistant, broad spectrum, SPF at least 30, need for reapplication every 2 to 3 hours).    -Patient to contact office if AK fails to resolve despite treatment, or if patient develops side effect from therapy, such as unbearable crusting, scabbing, redness, or tenderness. 2. Neoplasm of uncertain behavior  DDx: rule out SCC  -Discussed possible diagnosis. Patient agreeable to biopsy. Verbal consent obtained after risks (infection, bleeding, scar, dyspigmentation), benefits and alternatives explained. -Area(s) to be biopsied were marked with a surgical pen. Site(s) were cleansed with alcohol. Local anesthesia achieved with 1% lidocaine with epinephrine/sodium bicarbonate. Shave biopsy performed with a razor blade. Hemostasis was achieved with aluminum chloride and electrodessication. The wound(s) were dressed with petrolatum and covered with a bandage. Specimen(s) sent to pathology. Pt educated re: risk of bleeding, infection, scar, discoloration, and wound care instructions. 3. Photoaging of skin  -Patient's skin showing evidence of chronic sun exposure leading to diffuse photodamage and photo-aging  -Educated patient that chronic sun exposure leads to increased risk for skin cancers and to have at least annual skin exams (earlier if indicated) in the office.   -The patient was counseled regarding sun protective practices such as sunscreen use (water resistant, broad spectrum sunscreen with SPF rating of at least 30) and need for reapplication at least every 2 hours, sun protective clothing (including hat and sunglasses), avoidance of sun during the peak hours of the day, and avoidance of tanning beds. 4. EIC  -I counseled patient regarding cysts are benign sacs within the skin filled with keratin.    -Informed patient to call the office If the cyst should ever rupture or become red and tender  -Plan: Reassurance and Observation  -Patient informed that there is a potential for recurrent symptoms. Educated that  Intralesional steroid can reduce inflammation and excision as only definitive treatment    5.  History of basal cell carcinoma  No evidence of recurrence      Return to Clinic:  6 month skin exam;  30 min punch excision of EIC on

## 2020-01-20 LAB — DERMATOLOGY PATHOLOGY REPORT: NORMAL

## 2020-01-30 ENCOUNTER — PROCEDURE VISIT (OUTPATIENT)
Dept: DERMATOLOGY | Age: 70
End: 2020-01-30
Payer: MEDICARE

## 2020-01-30 VITALS — DIASTOLIC BLOOD PRESSURE: 77 MMHG | WEIGHT: 194.2 LBS | BODY MASS INDEX: 28.68 KG/M2 | SYSTOLIC BLOOD PRESSURE: 132 MMHG

## 2020-01-30 PROCEDURE — 17000 DESTRUCT PREMALG LESION: CPT | Performed by: DERMATOLOGY

## 2020-01-30 PROCEDURE — 11421 EXC H-F-NK-SP B9+MARG 0.6-1: CPT | Performed by: DERMATOLOGY

## 2020-01-30 PROCEDURE — 17003 DESTRUCT PREMALG LES 2-14: CPT | Performed by: DERMATOLOGY

## 2020-01-30 PROCEDURE — 12041 INTMD RPR N-HF/GENIT 2.5CM/<: CPT | Performed by: DERMATOLOGY

## 2020-01-30 NOTE — PROGRESS NOTES
[facility]) Dermatology  [place], [place], [address]      Isaac Beaulieu  1950    65 y.o. male     Date of Visit: 1/30/2020    Chief Complaint: Cyst    History of Present Illness:  1. Pt is here for excision of a epidermal inclusion cyst to right lateral neck, present for at least 1 year and enlarging slowly. -Pacemaker/ICD: No  -Joint prosthesis in last 2 years: No  -Difficulty with numbing in the past: No  -Local Anesthesia Reaction: No  -Artificial Heart Valve: No  -Organ Transplant: No  -Immunosuppression: No  -Bleeding/Clotting Disorders: No  -Anticoagulant Therapy: Yes, eloquis  -Prior problems with wound healing: No    2. Biopsy of lesion to right dorsal hand showed hypertrophic actinic keratosis, presents today for cryotherapy. Rough area around scab and second rough area adjacent to it. Review of Systems:  Constitutional: Reports general sense of well-being. Past Medical History, Medications and Allergies reviewed.      Past Medical History:   Diagnosis Date    Allergic rhinitis     Atrial fibrillation (HCC)     Cancer (HCC)     Basal Cell cancer on right forehead    Chronic LBP     Diabetes mellitus ([address]     Non-insulin dependent    Erectile dysfunction 4/6/2012    History of blood transfusion     Hypercholesteremia     Hypertension     Kidney stone on left side 5/2/2012    Type II or unspecified type diabetes mellitus without mention of complication, not stated as uncontrolled     Wears glasses      Past Surgical History:   Procedure Laterality Date    APPENDECTOMY Right 01/01/1955    CARDIAC SURGERY      COLONOSCOPY      SKIN BIOPSY Right 01/01/2007    forehead- cancer       No Known Allergies  Outpatient Medications Marked as Taking for the 1/30/20 encounter (Procedure visit) with [place],    Medication Sig Dispense Refill    apixaban (ELIQUIS) 5 MG TABS tablet Take 1 tablet by mouth 2 times daily 60 tablet 0    flecainide (TAMBOCOR) 100 MG tablet TAKE 1 TABLET EVERY DAY 90 tablet 1    metoprolol succinate (TOPROL XL) 50 MG extended release tablet Take 1 tablet by mouth daily 90 tablet 2    metFORMIN (GLUCOPHAGE) 1000 MG tablet TAKE 1 TABLET TWICE DAILY WITH MEALS 180 tablet 3    HYDROcodone-acetaminophen (NORCO) 7.5-325 MG per tablet TAKE ONE TABLET BY MOUTH EVERY 4-6 HOURS AS NEEDED FOR PAIN. MAXIMUM 5 per day. . 150 tablet 0    fenofibrate 160 MG tablet TAKE 1 TABLET ONE TIME DAILY 90 tablet 3    losartan-hydrochlorothiazide (HYZAAR) 100-12.5 MG per tablet TAKE 1 TABLET EVERY DAY 90 tablet 3    atorvastatin (LIPITOR) 40 MG tablet TAKE 1 TABLET BY MOUTH ONE TIME A DAY 90 tablet 3    Multiple Vitamins-Minerals (THERAPEUTIC MULTIVITAMIN-MINERALS) tablet Take 1 tablet by mouth daily.  Cholecalciferol (VITAMIN D) 2000 UNITS CAPS capsule Take  by mouth daily.  vitamin B-12 (CYANOCOBALAMIN) 1000 MCG tablet Take 1,200 mcg by mouth daily. Physical Examination     Vitals:  /77   Wt 194 lb 3.2 oz (88.1 kg)   BMI 28.68 kg/m²     -General: Well-appearing, NAD  1. Right lateral neck- 0.6x0.5cm yellowish subcutaneous mobile papule with 2 keratin plugged punctum         2. Right dorsal hand- ill defined irreg shaped gritty keratotic pink macule(s) surrounding hemorrhagic crusted erosion, one ill defined irreg shaped gritty keratotic pink macule(s)   Assessment and Plan     1. EIC  The site to be treated was confirmed with the patient and the previous note. The patient was educated regarding potential risks of bleeding, discomfort, scar, wound dehiscence, infection, pain, and recurrence. A consent form was signed by the patient. The area was prepped and draped in the normal sterile fashion. Local anesthesia was obtained wth 1% lidocaine with epinephrine. An incision was performed overlying the cyst, the cyst was visualized and dissected from the surrounding tissue.  The specimen was submitted to pathology in an appropriately labeled specimen container. Pinpoint hemostasis was obtained. The wound edges were undermined and approximated in a layered fashion with buried sutures of 4-0 vicryl and simple interrupted 5-0 ethilon. The final length of the wound measured 2.2 cm. Blood loss was minimal and there were no immediate complications. The wound was covered with petrolatum and a pressure bandage. The patient is to return in 7-10 days for suture removal. Pt states daughter is a nurse, she will remove sutures. Pt left in stable condition. 2. Actinic keratoses  -Edu re: relationship with chronic cumulative sun exposure, low premalignant potential.   -right dorsal hand, 2 lesion(s) treated w/ liquid nitrogen x 1cycles, 3 seconds each located    Edu re: risk of blister formation, discomfort, scar, dyspigmentation. Discussed wound care. -Reviewed sun protective behavior -- sun avoidance during the peak hours of the day, sun-protective clothing (including hat and sunglasses), sunscreen use (water resistant, broad spectrum, SPF at least 30, need for reapplication every 2 to 3 hours). -Patient to contact office if AK fails to resolve despite treatment, or if patient develops side effect from therapy, such as unbearable crusting, scabbing, redness, or tenderness.

## 2020-01-30 NOTE — PATIENT INSTRUCTIONS
Sun Protection Tips    Apply broad spectrum water resistant sunscreen with an SPF of at least 30 to exposed areas of the skin. Dont forget the ears and lips! Remember to reapply sunscreen about every 2 hours and after swimming or sweating. Wear sun protective clothing. Swim shirts (aka. rash guards) are a great idea and negates the need to reapply sunscreen in those areas. Seek the shade whenever possible especially between the hours of 10am and 4pm when the suns rays are the strongest.     Avoid tanning beds          Wear a wide brim hat while in the sun      Cryosurgery (Freezing) Wound Care Instructions    AFTER THE PROCEDURE:    You will notice swelling and redness around the site. This is normal.    You may experience a sharp or sore feeling for the next several days. For this discomfort, you may take acetaminophen (Tylenol©).  A blister may develop at the treated area, sometimes as soon as by the end of the day. After several days, the blister will subside and a scab will form.  If the area is bumped or traumatized during the first few days following freezing, you may develop bleeding into the blister, forming a blood blister. This is nothing to be alarmed about.  If the blister is tense, uncomfortable, or much larger than the site that was frozen, you may pop the blister along its edge with a sterile needle (boiled, heated under a flame, or cleaned with alcohol) to allow the fluid to drain out. If the blister does not bother you, no treatment is needed.  Do NOT peel off the top of the blister roof. It will act as a dressing on top of your wound. WOUND CARE:    You may shower or bathe as usual, but avoid scrubbing the areas that have been frozen.  Cleanse the site twice a day with mild soapy water, and then apply a thin film of white petrolatum (Vaseline©).  You do not need to cover the area, but can if you prefer.     Do NOT allow the site to become dry or crusted, or attempt to dry it out with rubbing alcohol or hydrogen peroxide.  Continue this regimen until the area is pink and healed. Depending on the size and location of your cryosurgery site, healing may take 2 to 4 weeks.  The area may continue to be pink for several weeks, and over the next few months may become darker or lighter than the surrounding skin. This may be a permanent change. Biopsy Wound Care Instructions   Cleanse the wound with mild soapy water daily.  Gently dry the area.  Apply Vaseline or petroleum jelly to the wound using a cotton tipped applicator or Qtip.  Cover with a clean bandage.  Repeat this process until the biopsy site is healed.  If you had stitches placed, continue treating the site until the stitches are removed. Remember to make an appointment to return to have your stitches removed by our staff.  You may shower and bathe as usual.   ** Biopsy results generally take around 7 business days to come back. If you have not heard from us by then, please call the office at [phone] between 8AM and 4PM Monday through Friday. Thanks for your visit! Feel free to call Dr. Lyudmila Hussein assistantMary Jane if you have questions, concerns or to schedule your cosmetic procedures.

## 2020-01-30 NOTE — TELEPHONE ENCOUNTER
Letter awaiting signature
Multiple messages left for patient to return call to office with no return phone call.
Patient called with message left for patient to call back to office.
Mood Disorder current/past/Agitation/Severe Anxiety/Panic/Family history of suicide

## 2020-02-04 LAB — DERMATOLOGY PATHOLOGY REPORT: NORMAL

## 2020-02-05 ENCOUNTER — TELEPHONE (OUTPATIENT)
Dept: DERMATOLOGY | Age: 70
End: 2020-02-05

## 2020-02-05 NOTE — TELEPHONE ENCOUNTER
Called and informed pt of result via voicemail. Ok per [employer]. Advised to call back if theres any questions.

## 2020-06-30 ENCOUNTER — OFFICE VISIT (OUTPATIENT)
Dept: CARDIOLOGY CLINIC | Age: 70
End: 2020-06-30
Payer: MEDICARE

## 2020-06-30 VITALS
HEART RATE: 59 BPM | DIASTOLIC BLOOD PRESSURE: 84 MMHG | WEIGHT: 195.5 LBS | HEIGHT: 69 IN | SYSTOLIC BLOOD PRESSURE: 162 MMHG | BODY MASS INDEX: 28.96 KG/M2 | OXYGEN SATURATION: 97 %

## 2020-06-30 PROCEDURE — 3017F COLORECTAL CA SCREEN DOC REV: CPT | Performed by: NURSE PRACTITIONER

## 2020-06-30 PROCEDURE — 4040F PNEUMOC VAC/ADMIN/RCVD: CPT | Performed by: NURSE PRACTITIONER

## 2020-06-30 PROCEDURE — 99214 OFFICE O/P EST MOD 30 MIN: CPT | Performed by: NURSE PRACTITIONER

## 2020-06-30 PROCEDURE — 1123F ACP DISCUSS/DSCN MKR DOCD: CPT | Performed by: NURSE PRACTITIONER

## 2020-06-30 PROCEDURE — G8427 DOCREV CUR MEDS BY ELIG CLIN: HCPCS | Performed by: NURSE PRACTITIONER

## 2020-06-30 PROCEDURE — 1036F TOBACCO NON-USER: CPT | Performed by: NURSE PRACTITIONER

## 2020-06-30 PROCEDURE — 93000 ELECTROCARDIOGRAM COMPLETE: CPT | Performed by: NURSE PRACTITIONER

## 2020-06-30 PROCEDURE — G8417 CALC BMI ABV UP PARAM F/U: HCPCS | Performed by: NURSE PRACTITIONER

## 2020-06-30 NOTE — PROGRESS NOTES
[facility]   Electrophysiology Note              Date:  June 30, 2020  Patient name: Buddy Morgan  YOB: 1950    Primary Care physician: Isaak Mckenna MD    HISTORY OF PRESENT ILLNESS: The patient is a 65 y.o.  male with a history of paroxysmal atrial fibrillation, atrial flutter, HTN, DM, ED, and possible TIA. He was started on flecainide in 2014 and no known recurrence of AF until 11/2018. Echo in 1/2019 showed an EF of 55%. In 2/2019 he had RFCA of PAF. An outpatient sleep study was recommended. In 3/2019 a 24 hour Holter showed atrial flutter. He remained in atrial flutter with elevated rates at his visit in 3/2019 then spontaneously converted to sinus. In 5/2019 he was diagnosed with moderate ALCIDES. He was admitted in 1/2020 with nausea, dizziness, and ataxia. Was diagnosed with vertigo versus TIA and he was switched to Eliquis. Echo in 1/2020 showed an EF of 55%. He has remained in sinus at subsequent visits. Today he is being seen for atrial fibrillation and atrial flutter. EKG shows sinus sylvia with a 1st degree AVB with a HR of 56. He is feeling well and denies chest pain, palpitations, shortness of breath, and dizziness. Home BP is averaging 133/73. Past Medical History:   has a past medical history of Allergic rhinitis, Atrial fibrillation ([address] Cancer ([address] Chronic LBP, Diabetes mellitus ([address] Erectile dysfunction, History of blood transfusion, Hypercholesteremia, Hypertension, Kidney stone on left side, Type II or unspecified type diabetes mellitus without mention of complication, not stated as uncontrolled, and Wears glasses. Past Surgical History:   has a past surgical history that includes Appendectomy (Right, 01/01/1955); skin biopsy (Right, 01/01/2007); Colonoscopy; and Cardiac surgery. Home Medications:    Prior to Admission medications    Medication Sig Start Date End Date Taking?  Authorizing Provider   apixaban (ELIQUIS) 5 MG TABS tablet Take 1 tablet by mouth 2 times daily 1/30/20 7/28/20 Yes KESHIA Degroot CNP   flecainide (TAMBOCOR) 100 MG tablet TAKE 1 TABLET EVERY DAY 11/25/19  Yes KESHIA Degroot CNP   metoprolol succinate (TOPROL XL) 50 MG extended release tablet Take 1 tablet by mouth daily 9/17/19  Yes KESHIA Degroot CNP   metFORMIN (GLUCOPHAGE) 1000 MG tablet TAKE 1 TABLET TWICE DAILY WITH MEALS 3/2/19  Yes KESHIA Degroot CNP   HYDROcodone-acetaminophen ([address]) 7.5-325 MG per tablet TAKE ONE TABLET BY MOUTH EVERY 4-6 HOURS AS NEEDED FOR PAIN. MAXIMUM 5 per day. . 3/3/17  Yes Zarina Kong MD   fenofibrate 160 MG tablet TAKE 1 TABLET ONE TIME DAILY 1/9/17  Yes Zarina Kong MD   losartan-hydrochlorothiazide [facility]) 100-12.5 MG per tablet TAKE 1 TABLET EVERY DAY 12/8/16  Yes Zarina Kong MD   atorvastatin (LIPITOR) 40 MG tablet TAKE 1 TABLET BY MOUTH ONE TIME A DAY 11/17/16  Yes Zarina Kong MD   Multiple Vitamins-Minerals (THERAPEUTIC MULTIVITAMIN-MINERALS) tablet Take 1 tablet by mouth daily. Yes Historical Provider, MD   Cholecalciferol (VITAMIN D) 2000 UNITS CAPS capsule Take  by mouth daily. Yes Historical Provider, MD   vitamin B-12 (CYANOCOBALAMIN) 1000 MCG tablet Take 1,200 mcg by mouth daily. Yes Historical Provider, MD       Allergies:  Patient has no known allergies. Social History:   reports that he quit smoking about 15 years ago. His smoking use included cigarettes. He has a 28.00 pack-year smoking history. He has never used smokeless tobacco. He reports current alcohol use of about 1.0 standard drinks of alcohol per week. He reports that he does not use drugs. Family History: family history includes Diabetes in his father; Heart Disease in his brother; High Blood Pressure in his mother; Stroke in his mother. REVIEW OF SYSTEMS:    Constitutional: Negative. HENT: Negative   Eyes: Negative. Respiratory: Negative   Cardiovascular: see HPI  Gastrointestinal: Negative. no evidence of mass or thrombus in the left atrium or appendage.     Echo 1/11/2019:  Normal left ventricle systolic function with an estimated ejection fraction of 55%.   No regional wall motion abnormalities are seen.   Normal left ventricular diastolic filling pressure.   The left atrium is mildly dilated.   Mild mitral and tricuspid regurgitation.   Systolic pulmonary artery pressure (SPAP) is normal and estimated at 32 mmHg   (right atrial pressure 3 mmHg). GXT 12/12/2011:  CONCLUSIONS-  1. Mild fitness impairment for age. 2. Nondiagnostic study due to inadequate cardiac stress but no  diagnostic abnormalities at a heart rate of 116. CARDIOLOGY LABS:   CBC: No results for input(s): WBC, HGB, HCT, PLT in the last 72 hours. BMP: No results for input(s): NA, K, CO2, BUN, CREATININE, LABGLOM, GLUCOSE in the last 72 hours. PT/INR:   No results for input(s): PROTIME, INR in the last 72 hours. APTT:No results for input(s): APTT in the last 72 hours. FASTING LIPID PANEL:  Lab Results   Component Value Date    HDL 29 01/13/2020    HDL 43 04/06/2012    LDLDIRECT 146 07/05/2016    LDLCALC 71 01/13/2020    TRIG 168 01/13/2020     LIVER PROFILE:No results for input(s): AST, ALT, ALB in the last 72 hours. Assessment:  1. Paroxysmal (formerly persistent) atrial arrhythmias (AF and AFL): stable   -s/p RFCA of PAF using [facility] technique on 2/28/2019   -24 hour Holter in 3/2019 showed atrial flutter   -[id number] score 3-5 (age, HTN, DM, possible TIA)  2. First degree AV block: stable  3. Sinus bradycardia: stable, asymptomatic   4. HTN: controlled historically and per home reports  5. DM: PCP managing  6. ALCIDES: moderate, compliant with BiPAP    Plan:  1. Continue flecainide, Toprol, losartan, HCTZ, and Eliquis   2. Recommend EP study and possible RFCA if atrial flutter is recurrent (with treatment of ALCIDES, hoping atrial arrhythmias are not recurrent)   3. Patient is asymptomatic with bradycardia.  Will continue to

## 2020-07-21 ENCOUNTER — OFFICE VISIT (OUTPATIENT)
Dept: DERMATOLOGY | Age: 70
End: 2020-07-21
Payer: MEDICARE

## 2020-07-21 VITALS — TEMPERATURE: 97.9 F

## 2020-07-21 PROCEDURE — 17000 DESTRUCT PREMALG LESION: CPT | Performed by: DERMATOLOGY

## 2020-07-21 PROCEDURE — 4040F PNEUMOC VAC/ADMIN/RCVD: CPT | Performed by: DERMATOLOGY

## 2020-07-21 PROCEDURE — 17003 DESTRUCT PREMALG LES 2-14: CPT | Performed by: DERMATOLOGY

## 2020-07-21 PROCEDURE — 1123F ACP DISCUSS/DSCN MKR DOCD: CPT | Performed by: DERMATOLOGY

## 2020-07-21 PROCEDURE — 99213 OFFICE O/P EST LOW 20 MIN: CPT | Performed by: DERMATOLOGY

## 2020-07-21 PROCEDURE — 3017F COLORECTAL CA SCREEN DOC REV: CPT | Performed by: DERMATOLOGY

## 2020-07-21 PROCEDURE — G8417 CALC BMI ABV UP PARAM F/U: HCPCS | Performed by: DERMATOLOGY

## 2020-07-21 PROCEDURE — 1036F TOBACCO NON-USER: CPT | Performed by: DERMATOLOGY

## 2020-07-21 PROCEDURE — G8427 DOCREV CUR MEDS BY ELIG CLIN: HCPCS | Performed by: DERMATOLOGY

## 2020-07-21 RX ORDER — APIXABAN 5 MG/1
TABLET, FILM COATED ORAL
Qty: 180 TABLET | Refills: 3 | Status: SHIPPED | OUTPATIENT
Start: 2020-07-21 | End: 2021-07-22

## 2020-07-21 NOTE — PATIENT INSTRUCTIONS

## 2020-07-21 NOTE — PROGRESS NOTES
to right forehead s/p surgical excision 6 years ago, forgets name of surgeon. Patient denies past history of melanoma, dysplastic nevi, or chronic skin rashes.     PFHx: Denies hx of MM or NMSC    Family History   Problem Relation Age of Onset    High Blood Pressure Mother     Stroke Mother     Diabetes Father     Heart Disease Brother      Past Medical History:   Diagnosis Date    Allergic rhinitis     Atrial fibrillation ([address]     Cancer ([address]     Basal Cell cancer on right forehead    Chronic LBP     Diabetes mellitus ([address]     Non-insulin dependent    Erectile dysfunction 4/6/2012    History of blood transfusion     Hypercholesteremia     Hypertension     Kidney stone on left side 5/2/2012    Type II or unspecified type diabetes mellitus without mention of complication, not stated as uncontrolled     Wears glasses      Past Surgical History:   Procedure Laterality Date    APPENDECTOMY Right 01/01/1955    CARDIAC SURGERY      COLONOSCOPY      SKIN BIOPSY Right 01/01/2007    forehead- cancer       No Known Allergies  Outpatient Medications Marked as Taking for the 7/21/20 encounter (Office Visit) with Payton Desai DO   Medication Sig Dispense Refill    ELIQUIS 5 MG TABS tablet TAKE 1 TABLET TWICE DAILY 180 tablet 3    flecainide (TAMBOCOR) 100 MG tablet TAKE 1 TABLET EVERY DAY 90 tablet 1    metoprolol succinate (TOPROL XL) 50 MG extended release tablet Take 1 tablet by mouth daily 90 tablet 2    metFORMIN (GLUCOPHAGE) 1000 MG tablet TAKE 1 TABLET TWICE DAILY WITH MEALS 180 tablet 3    HYDROcodone-acetaminophen (NORCO) 7.5-325 MG per tablet TAKE ONE TABLET BY MOUTH EVERY 4-6 HOURS AS NEEDED FOR PAIN. MAXIMUM 5 per day. . 150 tablet 0    fenofibrate 160 MG tablet TAKE 1 TABLET ONE TIME DAILY 90 tablet 3    losartan-hydrochlorothiazide (HYZAAR) 100-12.5 MG per tablet TAKE 1 TABLET EVERY DAY 90 tablet 3    atorvastatin (LIPITOR) 40 MG tablet TAKE 1 TABLET BY MOUTH ONE TIME A DAY 90 tablet 3    Multiple Vitamins-Minerals (THERAPEUTIC MULTIVITAMIN-MINERALS) tablet Take 1 tablet by mouth daily.  Cholecalciferol (VITAMIN D) 2000 UNITS CAPS capsule Take  by mouth daily.  vitamin B-12 (CYANOCOBALAMIN) 1000 MCG tablet Take 1,200 mcg by mouth daily. Social History:   Social History     Socioeconomic History    Marital status:      Spouse name: Not on file    Number of children: 3    Years of education: Not on file    Highest education level: Not on file   Occupational History    Not on file   Social Needs    Financial resource strain: Not on file    Food insecurity     Worry: Not on file     Inability: Not on file   [employer] needs     Medical: Not on file     Non-medical: Not on file   Tobacco Use    Smoking status: Former Smoker     Packs/day: 0.80     Years: 35.00     Pack years: 28.00     Types: Cigarettes     Last attempt to quit: 12/3/2004     Years since quitting: 15.6    Smokeless tobacco: Never Used   Substance and Sexual Activity    Alcohol use:  Yes     Alcohol/week: 1.0 standard drinks     Types: 1 Cans of beer per week     Comment: 1 beer a month    Drug use: No    Sexual activity: Yes     Partners: Female   Lifestyle    Physical activity     Days per week: Not on file     Minutes per session: Not on file    Stress: Not on file   Relationships    Social connections     Talks on phone: Not on file     Gets together: Not on file     Attends [employer] service: Not on file     Active member of club or organization: Not on file     Attends meetings of clubs or organizations: Not on file     Relationship status: Not on file    Intimate partner violence     Fear of current or ex partner: Not on file     Emotionally abused: Not on file     Physically abused: Not on file     Forced sexual activity: Not on file   Other Topics Concern    Not on file   Social History Narrative    Not on file       Physical Examination     The following were examined and determined to be normal: Psych/Neuro, Scalp/hair, Head/face, Conjunctivae/eyelids, Gums/teeth/lips, Neck, RUE and Nails/digits. The following were examined and determined to be abnormal: LUE. Harmon phototype: 2    -Constitutional: Well appearing, no acute distress  -Neurological: Alert and oriented X 3  -Mood and Affect: Pleasant  Total body skin exam performed, areas examined listed above:   1. Left dorsal hand (2), left dorsal forearm (2),-ill defined irreg shaped gritty keratotic pink macule(s). Right dorsal hand-clear  2. several discrete and coalescing few 2-4 mm round uniformly brown macules scattered to face, neck, and sun exposed areas on upper extremities  3. Right lateral forehead- no obvious scar, no evidence of recurrence of BCC  4. Right lateral shin- no appreciable skin lesion on exam today  Assessment and Plan     1. Actinic keratoses    2. Solar lentiginosis    3. History of basal cell carcinoma    4. Normal skin appearance        1. Actinic keratoses  -Edu re: relationship with chronic cumulative sun exposure, low premalignant potential.   Verbal consent obtained.   -Left dorsal hand (2), left dorsal forearm (2) 4 lesion(s) treated w/ liquid nitrogen x 1cycles, 3 seconds each located    Edu re: risk of blister formation, discomfort, scar, dyspigmentation. Discussed wound care. -Reviewed sun protective behavior -- sun avoidance during the peak hours of the day, sun-protective clothing (including hat and sunglasses), sunscreen use (water resistant, broad spectrum, SPF at least 30, need for reapplication every 2 to 3 hours). -Patient to contact office if AK fails to resolve despite treatment, or if patient develops side effect from therapy, such as unbearable crusting, scabbing, redness, or tenderness.       2. Solar lentiginosis  Solar lentigines  -Edu re: benignity, relationship w/ chronic cumulative sun exposure, darkening w/ unprotected sun exposure  -Reviewed sun protective behavior -- sun avoidance during the peak hours of the day, sun-protective clothing (including hat and sunglasses), sunscreen use (water resistant, broad spectrum, SPF at least 30, need for reapplication every 2 to 3 hours), avoidance of tanning beds     3. History of basal cell carcinoma  No evidence of recurrence   Skin Care:  Skin cancer is primarily a result of exposure to UV light. Patients with a history of non-melanoma skin cancer should wear sunscreen, sun protective clothing, wide-brimmed hats and practice sun avoidance as much as possible to decrease their risk of developing new skin cancers. Expectations:  Scars from where skin cancers have been removed should be monitored for recurrences. Contact office:  If the patient notices discoloration, bleeding, or a bump arising in a previously healed scar or if a new lesion develops elsewhere. 4. Normal skin appearance  No appreciable lesion on exam, advised patient to call the office if returns to become symptomatic or worrisome changes. Return to Clinic: 1 year skin exam   Discussed plan with patient and/or primary caretaker. Patient to call clinic with any questions / concerns. Reviewed proper use and side effects of treatment(s) and/or medication(s) with patient and/or primary caretaker. AVS provided or is available on [employer]     Note is transcribed using voice recognition software. Inadvertent computerized transcription errors may be present.

## 2020-08-07 NOTE — TELEPHONE ENCOUNTER
Last OV 6/30/20  Plan:  1. Continue flecainide, Toprol, losartan, HCTZ, and Eliquis   2. Recommend EP study and possible RFCA if atrial flutter is recurrent (with treatment of ALCIDES, hoping atrial arrhythmias are not recurrent)   3. Patient is asymptomatic with bradycardia. Will continue to monitor . 4. Annual BMP and CBC (due 5/2021)  5.  Follow up in one year

## 2020-08-11 NOTE — TELEPHONE ENCOUNTER
Creatinine on 1/12/2020 was 0.9. Is xarelto 20 mg daily OK? I have it pended if so. If not, what dose would you like? Please send message back to me, so I can call patient.   Thank you

## 2020-09-14 ENCOUNTER — ANTI-COAG VISIT (OUTPATIENT)
Dept: PHARMACY | Age: 70
End: 2020-09-14

## 2021-02-18 NOTE — TELEPHONE ENCOUNTER
Medication Refill    Medication needing refilled: flecainide (TAMBOCOR) 100 MG tablet        Dosage of the medication: 100mg    How are you taking this medication (QD, BID, TID, QID, PRN):   Taking once daily    30 or 90 day supply called in:  80     When will you run out of your medication:  3wks    Which Pharmacy are we sending the medication to?: [address] [place] [address] [phone] - F [phone]   [facility] [place] [facility] [place] [address]

## 2021-02-19 NOTE — TELEPHONE ENCOUNTER
6/30/2020 NPBB  6/30/2020 ekg  11/5/2020 [facility] Insurance faxed the office requesting additional information. [facility] at [phone] and provided additional information. Office will be notified via fax once a decision has been made.

## 2021-02-22 RX ORDER — FLECAINIDE ACETATE 100 MG/1
TABLET ORAL
Qty: 90 TABLET | Refills: 1 | Status: SHIPPED | OUTPATIENT
Start: 2021-02-22 | End: 2021-06-29

## 2021-06-29 RX ORDER — FLECAINIDE ACETATE 100 MG/1
TABLET ORAL
Qty: 90 TABLET | Refills: 1 | Status: SHIPPED | OUTPATIENT
Start: 2021-06-29 | End: 2021-11-19

## 2021-07-02 RX ORDER — METOPROLOL SUCCINATE 50 MG/1
50 TABLET, EXTENDED RELEASE ORAL DAILY
Qty: 30 TABLET | Refills: 0 | Status: SHIPPED | OUTPATIENT
Start: 2021-07-02

## 2021-07-02 NOTE — TELEPHONE ENCOUNTER
I sent 30 tablets to [facility]. I don't see a refill was ever sent to [facility] by our office. I tried to call the patient to clarify, no answer. Will clarify at upcoming visit.

## 2021-07-02 NOTE — TELEPHONE ENCOUNTER
Last OV: 06/30/2020  Next OV: 07/07/2021  Most recent Labs:   Last PT/INR (if needed):  Last EKG (if needed):06/30/2020

## 2021-07-22 RX ORDER — APIXABAN 5 MG/1
TABLET, FILM COATED ORAL
Qty: 60 TABLET | Refills: 0 | Status: SHIPPED | OUTPATIENT
Start: 2021-07-22 | End: 2021-08-24

## 2021-07-22 NOTE — TELEPHONE ENCOUNTER
Last OV: 6/30/20  Next OV: 1 yr f/u 6/2021, needs apt, no vm set up   Last refill:7/21/20  Most recent Labs: 1/13/20  Last PT/INR (if needed):1/13/20  Last EKG (if needed):6/30/20

## 2021-07-26 NOTE — PATIENT INSTRUCTIONS
to dry it out with rubbing alcohol or hydrogen peroxide.  Continue this regimen until the area is pink and healed. Depending on the size and location of your cryosurgery site, healing may take 2 to 4 weeks.  The area may continue to be pink for several weeks, and over the next few months may become darker or lighter than the surrounding skin. This may be a permanent change. Biopsy Wound Care Instructions   Cleanse the wound twice a day with mild soapy water.  Gently dry the area.  Apply Vaseline/Aquaphor to the wound using a cotton tipped applicator or Qtip.  Cover with a clean bandage.  Repeat this process until the biopsy site is healed. You will know when it's healed when it's pink and shiny.  You may shower and bathe as usual.      If bleeding should occur, apply firm pressure to bleeding area for 15 minutes and repeat if needed. If bleeding continues after 30 minutes, please call office and ask to speak to Mary Jane.        ** Biopsy results generally take around 7-10  business days to come back. A member of Dr. Jamilah Aguilar staff will call you when the results are have been reviewed and a personalized treatment plan has been established. If you don't hear from our staff after the 10 business days, please call our office for your results. Thanks for your visit! Feel free to call/[employer] message  Dr. Shawna Ceron assistant, Mary Jane if you have questions, concerns or to schedule your cosmetic procedures.

## 2021-07-27 ENCOUNTER — OFFICE VISIT (OUTPATIENT)
Dept: DERMATOLOGY | Age: 71
End: 2021-07-27
Payer: MEDICARE

## 2021-07-27 VITALS — TEMPERATURE: 97.1 F

## 2021-07-27 DIAGNOSIS — L81.4 SOLAR LENTIGINOSIS: ICD-10-CM

## 2021-07-27 DIAGNOSIS — Z85.828 HISTORY OF BASAL CELL CARCINOMA: ICD-10-CM

## 2021-07-27 DIAGNOSIS — D48.9 NEOPLASM OF UNCERTAIN BEHAVIOR: Primary | ICD-10-CM

## 2021-07-27 DIAGNOSIS — L72.0 EIC (EPIDERMAL INCLUSION CYST): ICD-10-CM

## 2021-07-27 DIAGNOSIS — L57.0 ACTINIC KERATOSES: ICD-10-CM

## 2021-07-27 DIAGNOSIS — D22.9 MULTIPLE BENIGN NEVI: ICD-10-CM

## 2021-07-27 PROCEDURE — 17000 DESTRUCT PREMALG LESION: CPT | Performed by: DERMATOLOGY

## 2021-07-27 PROCEDURE — 4040F PNEUMOC VAC/ADMIN/RCVD: CPT | Performed by: DERMATOLOGY

## 2021-07-27 PROCEDURE — G8427 DOCREV CUR MEDS BY ELIG CLIN: HCPCS | Performed by: DERMATOLOGY

## 2021-07-27 PROCEDURE — 11103 TANGNTL BX SKIN EA SEP/ADDL: CPT | Performed by: DERMATOLOGY

## 2021-07-27 PROCEDURE — 3017F COLORECTAL CA SCREEN DOC REV: CPT | Performed by: DERMATOLOGY

## 2021-07-27 PROCEDURE — 17003 DESTRUCT PREMALG LES 2-14: CPT | Performed by: DERMATOLOGY

## 2021-07-27 PROCEDURE — 99213 OFFICE O/P EST LOW 20 MIN: CPT | Performed by: DERMATOLOGY

## 2021-07-27 PROCEDURE — 1036F TOBACCO NON-USER: CPT | Performed by: DERMATOLOGY

## 2021-07-27 PROCEDURE — 1123F ACP DISCUSS/DSCN MKR DOCD: CPT | Performed by: DERMATOLOGY

## 2021-07-27 PROCEDURE — G8421 BMI NOT CALCULATED: HCPCS | Performed by: DERMATOLOGY

## 2021-07-27 PROCEDURE — 11102 TANGNTL BX SKIN SINGLE LES: CPT | Performed by: DERMATOLOGY

## 2021-07-27 NOTE — PROGRESS NOTES
[facility]) Dermatology  [facility] [address], [place], [address] [place] [address]  [phone]       Ashley Beaulieu  1950    66 y.o. male     Date of Visit: 2021    Chief Complaint:   Chief Complaint   Patient presents with    Skin Lesion     spots, tbse     R cheek, left forehead        I was asked to see this patient by Dr. Mayers ref. provider found. History of Present Illness:  Amber Hughes is a 66 y.o. male who presents with the chief complaint of the followin. Patient request waist up skin exam only today. Denies skin concerns to lower body. Many year history of multiple nevi on the head/neck, trunk and upper extremities, all present for many years. Denies new moles. Denies moles changing in size, shape, color. None associated w/ pain, bleeding, pruritus. 2.  History of actinic keratoses to left dorsal hand and left dorsal forearm status post cryotherapy at last visit in 2020.    3.  History of BCC, denies recurrence. 4.  Complains of a bump \"underneath his skin\" to his left lateral forehead present for \"long time\"/years denies any changes in size, shape, color. Denies swelling, drainage, pain, tenderness, bleeding. 5.  Complains of an irritated raised new bump to his left dorsal hand. 6.  Complains of an irritated new scaly bump to his right cheek.      Admits to sun exposure in youth and adulthood without wearing sunscreen, hats, or protective clothing.  Denies regularly wearing sunscreen or protective hats/clothing when outdoors currently.  Does admit to sunburns in the past.    Review of Systems:  Constitutional: Reports general sense of well-being   Skin: No new or changing moles, no tendency to develop thick scars, no interval of severe sunburns  Heme: No abnormal bruising or bleeding. +eliquis      Past Skin Hx:  -2020-benign EIC located to right lateral neck status post surgical excision  -reported per patient, History of BCC located to right forehead s/p surgical excision 7 years ago, forgets name of surgeon.   -History of solar lentiginosis  -Patient denies history of melanoma, dysplastic nevi. PFHx: Denies hx of MM or NMSC    ADDITIONAL HISTORY:    I have reviewed past medical and surgical histories, current medications, allergies, social and family histories as documented in the patient's electronic medical record. Family History   Problem Relation Age of Onset    High Blood Pressure Mother     Stroke Mother     Diabetes Father     Heart Disease Brother      Past Medical History:   Diagnosis Date    Allergic rhinitis     Atrial fibrillation ([address]     Cancer ([address]     Basal Cell cancer on right forehead    Chronic LBP     Diabetes mellitus ([address]     Non-insulin dependent    Erectile dysfunction 4/6/2012    History of blood transfusion     Hypercholesteremia     Hypertension     Kidney stone on left side 5/2/2012    Type II or unspecified type diabetes mellitus without mention of complication, not stated as uncontrolled     Wears glasses      Past Surgical History:   Procedure Laterality Date    APPENDECTOMY Right 01/01/1955    CARDIAC SURGERY      COLONOSCOPY      SKIN BIOPSY Right 01/01/2007    forehead- cancer       No Known Allergies  Outpatient Medications Marked as Taking for the 7/27/21 encounter (Office Visit) with Suresh Hutchison,    Medication Sig Dispense Refill    ELIQUIS 5 MG TABS tablet TAKE 1 TABLET TWICE DAILY 60 tablet 0    metoprolol succinate (TOPROL XL) 50 MG extended release tablet Take 1 tablet by mouth daily 30 tablet 0    flecainide (TAMBOCOR) 100 MG tablet TAKE 1 TABLET EVERY DAY 90 tablet 1    metFORMIN (GLUCOPHAGE) 1000 MG tablet TAKE 1 TABLET TWICE DAILY WITH MEALS 180 tablet 3    HYDROcodone-acetaminophen (NORCO) 7.5-325 MG per tablet TAKE ONE TABLET BY MOUTH EVERY 4-6 HOURS AS NEEDED FOR PAIN. MAXIMUM 5 per day. . 150 tablet 0    fenofibrate 160 MG tablet TAKE 1 TABLET ONE TIME DAILY 90 tablet 3    losartan-hydrochlorothiazide (HYZAAR) 100-12.5 MG per tablet TAKE 1 TABLET EVERY DAY 90 tablet 3    atorvastatin (LIPITOR) 40 MG tablet TAKE 1 TABLET BY MOUTH ONE TIME A DAY 90 tablet 3    Multiple Vitamins-Minerals (THERAPEUTIC MULTIVITAMIN-MINERALS) tablet Take 1 tablet by mouth daily.  Cholecalciferol (VITAMIN D) 2000 UNITS CAPS capsule Take  by mouth daily.  vitamin B-12 (CYANOCOBALAMIN) 1000 MCG tablet Take 1,200 mcg by mouth daily. Social History:   Social History     Socioeconomic History    Marital status:      Spouse name: Not on file    Number of children: 3    Years of education: Not on file    Highest education level: Not on file   Occupational History    Not on file   Tobacco Use    Smoking status: Former Smoker     Packs/day: 0.80     Years: 35.00     Pack years: 28.00     Types: Cigarettes     Quit date: 12/3/2004     Years since quittin.6    Smokeless tobacco: Never Used   Vaping Use    Vaping Use: Never used   Substance and Sexual Activity    Alcohol use: Yes     Alcohol/week: 1.0 standard drinks     Types: 1 Cans of beer per week     Comment: 1 beer a month    Drug use: No    Sexual activity: Yes     Partners: Female   Other Topics Concern    Not on file   Social History Narrative    Not on file     Social Determinants of Health     Financial Resource Strain:     Difficulty of Paying Living Expenses:    Food Insecurity:     Worried About Running Out of Food in the Last Year:     Ran Out of Food in the Last Year:    Transportation Needs:     Lack of Transportation (Medical):      Lack of Transportation (Non-Medical):    Physical Activity:     Days of Exercise per Week:     Minutes of Exercise per Session:    Stress:     Feeling of Stress :    Social Connections:     Frequency of Communication with Friends and Family:     Frequency of Social Gatherings with Friends and Family:     Attends [employer] Services:     Active Member of Clubs or Organizations:     Attends Club or Organization Meetings:     Marital Status:    Intimate Partner Violence:     Fear of Current or Ex-Partner:     Emotionally Abused:     Physically Abused:     Sexually Abused:        Physical Examination     The following were examined and determined to be normal: Psych/Neuro, Scalp/hair, Conjunctivae/eyelids, Gums/teeth/lips, Neck, Breast/axilla/chest, Abdomen and Nails/digits. The following were examined and determined to be abnormal: Head/face, Back, RUE and LUE. Harmon phototype: 2    -Constitutional: Well appearing, no acute distress  -Neurological: Alert and oriented X 3  -Mood and Affect: Pleasant  Areas of skin examined as listed above:   1a.  Right lateral cheek-0.6 x 0.5 cm hyperkeratotic scaly pink papule      1b. Left medial mid back-0.9 x 0.7 cm irregularly bordered asymmetrical brown papule with multicomponent features on dermoscopy      1c. Left dorsal hand-0.5 x 0.4 cm ovoid shaped pink to hypopigmented mildly erythematous papule, central terminal hair protruding      2. ill defined irreg shaped gritty keratotic pink macule(s) located to left temple, left inferior lateral cheek, right distal upper arm, right dorsal hand and right forearm, left dorsal hand  3. Scattered on the head,neck, trunk and extremities are multiple well-defined round and oval symmetric smoothly-bordered uniformly brown macules and papules; no bleeding nevi. 4. several discrete and coalescing few 2-4 mm round uniformly brown macules scattered to face, neck, and sun exposed areas on torso and extremities  5. Left lateral forehead- <1cm subcutaneous mobile cystic like nodule, non-tender to palpation  6. Right lateral forehead-well-healed scar, clear  Assessment and Plan     1. Neoplasm of uncertain behavior    2. Actinic keratoses    3. Multiple benign nevi    4. Solar lentiginosis    5. EIC (epidermal inclusion cyst)    6. History of basal cell carcinoma        1.  Neoplasm of uncertain behavior  DDx:  Geraldyne Nyhan rule out NMSC  B. Rule out dysplastic nevus  C. Irritated dermal nevus rule v. dermatofibroma out atypia  -Discussed possible diagnosis. Patient agreeable to biopsy. Verbal consent obtained after risks (infection, bleeding, scar, dyspigmentation), benefits and alternatives explained. -Area(s) to be biopsied were marked with a surgical pen. Site(s) were cleansed with alcohol. Local anesthesia achieved with 1% lidocaine with epinephrine/sodium bicarbonate. Shave biopsy performed with a razor blade. Hemostasis was achieved with aluminum chloride and electrodesiccation. The wound(s) were dressed with petrolatum and covered with a bandage. Specimen(s) sent to pathology. Pt educated re: risk of bleeding, infection, scar, discoloration, and wound care instructions. 2. Actinic keratoses  -Edu re: relationship with chronic cumulative sun exposure, low premalignant potential.   Verbal consent obtained. - left temple, left inferior lateral cheek, right distal upper arm, right dorsal hand and right forearm, left dorsal hand, 6 lesion(s) treated w/ liquid nitrogen x 1cycles, 3 seconds each located    Edu re: risk of blister formation, discomfort, scar, dyspigmentation. Discussed wound care. -Reviewed sun protective behavior -- sun avoidance during the peak hours of the day, sun-protective clothing (including hat and sunglasses), sunscreen use (water resistant, broad spectrum, SPF at least 30, need for reapplication every 2 to 3 hours). -Patient to contact office if AK fails to resolve despite treatment or concern for recurrence (discussed signs/symptoms to monitor for) or if patient develops side effect from therapy, such as unbearable blister, crusting, scabbing, redness, or tenderness.       3. Multiple benign nevi  Benign acquired melanocytic nevi  -Recommend monthly self skin exams   -Educated regarding the ABCDEs of melanoma detection   -Call for any new/changing moles or concerning lesions  -Reviewed sun protective behavior -- sun avoidance during the peak hours of the day, sun-protective clothing (including hat and sunglasses), sunscreen use (water resistant, broad spectrum, SPF at least 30, need for reapplication every 1.5 to 2 hours), avoidance of tanning beds   -Plan: Observation with annual skin checks (earlier if indicated) performed in office to monitor current nevi and to assess for new lesions. 4. Solar lentiginosis  Solar lentigines  -Edu re: benignity, relationship w/ chronic cumulative sun exposure, darkening w/ unprotected sun exposure  -Reviewed sun protective behavior -- sun avoidance during the peak hours of the day, sun-protective clothing (including hat and sunglasses), sunscreen use (water resistant, broad spectrum, SPF at least 30, need for reapplication every 2 to 3 hours), avoidance of tanning beds   Observation, pt to call if changes in size, shape, color or experiences bleeding/pain/itching        5. EIC (epidermal inclusion cyst)  -I counseled patient regarding cysts are benign sacs within the skin filled with keratin.    -Informed patient to call the office If the cyst should ever rupture or become red and tender  -Plan: Reassurance and Observation  Observation, pt to call if changes in size, shape, color or experiences bleeding/pain/itching    -Patient informed that there is a potential for recurrent symptoms. Educated that  Intralesional steroid can reduce inflammation and excision is only definitive treatment    6. History of basal cell carcinoma  No evidence of recurrence   Skin Care:  Skin cancer is primarily a result of exposure to UV light. Patients with a history of non-melanoma skin cancer should wear broad-spectrum sunscreen (discussed proper use with patient),, sun protective clothing, wide-brimmed hats and practice sun avoidance as much as possible to decrease their risk of developing new skin cancers.   Expectations:  Scars from where skin cancers have been removed should be monitored for recurrences. Contact office:  If the patient notices discoloration, bleeding, or a bump arising in a previously healed scar or if a new lesion develops elsewhere. Return to Clinic: 1 year skin exam; PRN  Discussed plan with patient and/or primary caretaker. Patient to call clinic with any questions / concerns. Reviewed proper use and side effects of treatment(s) and/or medication(s) with patient and/or primary caretaker. AVS provided or is available on [facility]     Note is transcribed using voice recognition software. Inadvertent computerized transcription errors may be present.

## 2021-07-30 LAB — DERMATOLOGY PATHOLOGY REPORT: NORMAL

## 2021-08-05 ENCOUNTER — TELEPHONE (OUTPATIENT)
Dept: DERMATOLOGY | Age: 71
End: 2021-08-05

## 2021-08-16 ENCOUNTER — TELEPHONE (OUTPATIENT)
Dept: CARDIOLOGY CLINIC | Age: 71
End: 2021-08-16

## 2021-08-16 RX ORDER — LOSARTAN POTASSIUM AND HYDROCHLOROTHIAZIDE 12.5; 1 MG/1; MG/1
TABLET ORAL
Qty: 90 TABLET | Refills: 0 | Status: SHIPPED | OUTPATIENT
Start: 2021-08-16 | End: 2021-10-13

## 2021-08-16 NOTE — TELEPHONE ENCOUNTER
Received refill request for Losartan-HCTZ     Last OV: 06/03/2020 w/ NA    Last Labs: 06/07/2021 in [facility]     Last Refill: 12/08/2019 #90 w/ 3 refills     Next Appointment: 10/28/2021 brad/ NA

## 2021-08-16 NOTE — TELEPHONE ENCOUNTER
Spoke with office staff at PCP office and was advised that most recent BMP was on 06/07/2021 .  Faxing hard copy at this time

## 2021-08-16 NOTE — TELEPHONE ENCOUNTER
Refill  losartan-hydrochlorothiazide (HYZAAR) 100-12.5 MG per tablet    [address] [facility], [address] [facility]

## 2021-08-24 RX ORDER — APIXABAN 5 MG/1
TABLET, FILM COATED ORAL
Qty: 60 TABLET | Refills: 5 | Status: SHIPPED | OUTPATIENT
Start: 2021-08-24 | End: 2021-10-29

## 2021-08-31 NOTE — PATIENT INSTRUCTIONS

## 2021-09-14 ENCOUNTER — OFFICE VISIT (OUTPATIENT)
Dept: DERMATOLOGY | Age: 71
End: 2021-09-14
Payer: MEDICARE

## 2021-09-14 VITALS — TEMPERATURE: 98.6 F

## 2021-09-14 DIAGNOSIS — L57.0 ACTINIC KERATOSIS: Primary | ICD-10-CM

## 2021-09-14 PROCEDURE — 17000 DESTRUCT PREMALG LESION: CPT | Performed by: DERMATOLOGY

## 2021-09-14 NOTE — PROGRESS NOTES
[facility]) Dermatology  [facility] [place]  [phone]     Conrado Rainesgalindo Christofer  1950    66 y.o. male     Date of Visit: 2021    Chief Complaint:   Chief Complaint   Patient presents with    Actinic Keratosis     Cryo , Rt cheek        History of Present Illness:  Galindo Yanes is a 66 y.o. male who presents with the chief complaint of follow up for the followin. Biopsy proven hypertrophic actinic keratosis, focally transected at margin located on right lateral cheek, presents today for cryotherapy. No concerns since biopsy. Review of Systems:  Constitutional: Reports general sense of well-being   Skin: No new or changing moles, no history of keloids or hypertrophic scars. Heme: No abnormal bruising or bleeding. Past Medical History, Family History, Surgical History, Medications and Allergies reviewed.       Family History   Problem Relation Age of Onset    High Blood Pressure Mother     Stroke Mother     Diabetes Father     Heart Disease Brother      Past Medical History:   Diagnosis Date    Allergic rhinitis     Atrial fibrillation ([address]     Cancer ([address]     Basal Cell cancer on right forehead    Chronic LBP     Diabetes mellitus ([address]     Non-insulin dependent    Erectile dysfunction 2012    History of blood transfusion     Hypercholesteremia     Hypertension     Kidney stone on left side 2012    Type II or unspecified type diabetes mellitus without mention of complication, not stated as uncontrolled     Wears glasses      Past Surgical History:   Procedure Laterality Date    APPENDECTOMY Right 1955    CARDIAC SURGERY      COLONOSCOPY      SKIN BIOPSY Right 2007    forehead- cancer       No Known Allergies  Outpatient Medications Marked as Taking for the 21 encounter (Office Visit) with Wagner Harris DO   Medication Sig Dispense Refill    ELIQUIS 5 MG TABS tablet TAKE 1 TABLET TWICE DAILY 60 tablet 5    losartan-hydroCHLOROthiazide (HYZAAR) 100-12.5 MG per tablet TAKE 1 TABLET EVERY DAY 90 tablet 0    metoprolol succinate (TOPROL XL) 50 MG extended release tablet Take 1 tablet by mouth daily 30 tablet 0    flecainide (TAMBOCOR) 100 MG tablet TAKE 1 TABLET EVERY DAY 90 tablet 1    metFORMIN (GLUCOPHAGE) 1000 MG tablet TAKE 1 TABLET TWICE DAILY WITH MEALS 180 tablet 3    HYDROcodone-acetaminophen (NORCO) 7.5-325 MG per tablet TAKE ONE TABLET BY MOUTH EVERY 4-6 HOURS AS NEEDED FOR PAIN. MAXIMUM 5 per day. . 150 tablet 0    fenofibrate 160 MG tablet TAKE 1 TABLET ONE TIME DAILY 90 tablet 3    atorvastatin (LIPITOR) 40 MG tablet TAKE 1 TABLET BY MOUTH ONE TIME A DAY 90 tablet 3    Multiple Vitamins-Minerals (THERAPEUTIC MULTIVITAMIN-MINERALS) tablet Take 1 tablet by mouth daily.  Cholecalciferol (VITAMIN D) 2000 UNITS CAPS capsule Take  by mouth daily.  vitamin B-12 (CYANOCOBALAMIN) 1000 MCG tablet Take 1,200 mcg by mouth daily. Social History:   Social History     Socioeconomic History    Marital status:      Spouse name: Not on file    Number of children: 3    Years of education: Not on file    Highest education level: Not on file   Occupational History    Not on file   Tobacco Use    Smoking status: Former Smoker     Packs/day: 0.80     Years: 35.00     Pack years: 28.00     Types: Cigarettes     Quit date: 12/3/2004     Years since quittin.7    Smokeless tobacco: Never Used   Vaping Use    Vaping Use: Never used   Substance and Sexual Activity    Alcohol use:  Yes     Alcohol/week: 1.0 standard drinks     Types: 1 Cans of beer per week     Comment: 1 beer a month    Drug use: No    Sexual activity: Yes     Partners: Female   Other Topics Concern    Not on file   Social History Narrative    Not on file     Social Determinants of Health     Financial Resource Strain:     Difficulty of Paying Living Expenses:    Food Insecurity:     Worried About Running Out of [employer] in the Last Year:    [address] in the Last Year:    Transportation Needs:     Lack of Transportation (Medical):  Lack of Transportation (Non-Medical):    Physical Activity:     Days of Exercise per Week:     Minutes of Exercise per Session:    Stress:     Feeling of Stress :    Social Connections:     Frequency of Communication with Friends and Family:     Frequency of Social Gatherings with Friends and Family:     Attends [employer] Services:     Active Member of Clubs or Organizations:     Attends Club or Organization Meetings:     Marital Status:    Intimate Partner Violence:     Fear of Current or Ex-Partner:     Emotionally Abused:     Physically Abused:     Sexually Abused:        Physical Examination     The following were examined and determined to be normal: Psych/Neuro. The following were examined and determined to be abnormal: R lateral cheek. Well appearing, A&Ox3, NAD  1. Right lateral cheek-ill defined irreg shaped gritty keratotic pink macule(s)     Assessment and Plan     1. Actinic keratosis        1. Actinic keratosis  -Edu re: relationship with chronic cumulative sun exposure, low premalignant potential.   Verbal consent obtained.   -R lateral cheek, 1 lesion(s) treated w/ liquid nitrogen x 1cycles, 3 seconds each located    Edu re: risk of blister formation, discomfort, scar, dyspigmentation. Discussed wound care. -Reviewed sun protective behavior -- sun avoidance during the peak hours of the day, sun-protective clothing (including hat and sunglasses), sunscreen use (water resistant, broad spectrum, SPF at least 30, need for reapplication every 2 to 3 hours). -Patient to contact office if AK fails to resolve despite treatment or concern for recurrence (discussed signs/symptoms to monitor for) or if patient develops side effect from therapy, such as unbearable blister, crusting, scabbing, redness, or tenderness.       Return for yearly skin exam, contact insurance for new in network dermatologist.

## 2021-10-08 ENCOUNTER — TELEPHONE (OUTPATIENT)
Dept: CARDIOLOGY CLINIC | Age: 71
End: 2021-10-08

## 2021-10-08 NOTE — TELEPHONE ENCOUNTER
10/8/21 patient voiced concern about taking eliquis 5mg he is in the donut hole and it costs him $479.00 a month. [facility] does not have samples. Please call patient [phone] told patient Fransisco Tapia he said he would drive there.

## 2021-10-11 NOTE — TELEPHONE ENCOUNTER
Spoke with pt informed pt that samples of eliquis have been placed in a bag in the medication room for him.

## 2021-10-13 RX ORDER — LOSARTAN POTASSIUM AND HYDROCHLOROTHIAZIDE 12.5; 1 MG/1; MG/1
TABLET ORAL
Qty: 90 TABLET | Refills: 0 | Status: SHIPPED | OUTPATIENT
Start: 2021-10-13 | End: 2021-12-09 | Stop reason: SDUPTHER

## 2021-10-19 NOTE — PROGRESS NOTES
[facility]   Electrophysiology Note              Date:  October 28, 2021  Patient name: Kaley Olivia  YOB: 1950    Primary Care physician: Celsa Davis MD    HISTORY OF PRESENT ILLNESS: The patient is a 66 y.o.  male with a history of paroxysmal atrial fibrillation, atrial flutter, HTN, DM, and possible TIA. He was started on flecainide in 2014 and no known recurrence of AF until 11/2018. Echo in 1/2019 showed an EF of 55%. In 2/2019 he had RFCA of PAF. An outpatient sleep study was recommended. In 3/2019 a 24 hour Holter showed atrial flutter. He remained in atrial flutter with elevated rates at his visit in 3/2019 then spontaneously converted to sinus. In 5/2019 he was diagnosed with moderate ALCIDES. He was admitted in 1/2020 with nausea, dizziness, and ataxia. Was diagnosed with vertigo versus TIA and he was switched to Eliquis. Echo in 1/2020 showed an EF of 55%. He has remained in sinus at subsequent visits. Today he is being seen for atrial fibrillation and atrial flutter. EKG shows sinus sylvia with a 1st degree AVB with a HR of 59. He is feeling well and denies chest pain, palpitations, shortness of breath, and dizziness. Past Medical History:   has a past medical history of Allergic rhinitis, Atrial fibrillation ([address] Cancer ([address] Chronic LBP, Diabetes mellitus ([address] Erectile dysfunction, History of blood transfusion, Hypercholesteremia, Hypertension, Kidney stone on left side, Type II or unspecified type diabetes mellitus without mention of complication, not stated as uncontrolled, and Wears glasses. Past Surgical History:   has a past surgical history that includes Appendectomy (Right, 01/01/1955); skin biopsy (Right, 01/01/2007); Colonoscopy; and Cardiac surgery. Home Medications:    Prior to Admission medications    Medication Sig Start Date End Date Taking?  Authorizing Provider   losartan-hydroCHLOROthiazide (HYZAAR) 100-12.5 MG per tablet TAKE 1 TABLET EVERY DAY 10/13/21  Yes KESHIA Foote CNP   ELIQUIS 5 MG TABS tablet TAKE 1 TABLET TWICE DAILY 8/24/21  Yes KESHIA Foote CNP   metoprolol succinate (TOPROL XL) 50 MG extended release tablet Take 1 tablet by mouth daily 7/2/21  Yes KESHIA Foote CNP   flecainide (TAMBOCOR) 100 MG tablet TAKE 1 TABLET EVERY DAY 6/29/21  Yes KESHIA Foote CNP   metFORMIN (GLUCOPHAGE) 1000 MG tablet TAKE 1 TABLET TWICE DAILY WITH MEALS 3/2/19  Yes KESHIA Foote CNP   HYDROcodone-acetaminophen ([address]) 7.5-325 MG per tablet TAKE ONE TABLET BY MOUTH EVERY 4-6 HOURS AS NEEDED FOR PAIN. MAXIMUM 5 per day. . 3/3/17  Yes Alli Angel MD   fenofibrate 160 MG tablet TAKE 1 TABLET ONE TIME DAILY 1/9/17  Yes Alli Angel MD   atorvastatin (LIPITOR) 40 MG tablet TAKE 1 TABLET BY MOUTH ONE TIME A DAY 11/17/16  Yes Alli Angel MD   Multiple Vitamins-Minerals (THERAPEUTIC MULTIVITAMIN-MINERALS) tablet Take 1 tablet by mouth daily. Yes Historical Provider, MD   Cholecalciferol (VITAMIN D) 2000 UNITS CAPS capsule Take  by mouth daily. Yes Historical Provider, MD   vitamin B-12 (CYANOCOBALAMIN) 1000 MCG tablet Take 1,200 mcg by mouth daily. Yes Historical Provider, MD       Allergies:  Patient has no known allergies. Social History:   reports that he quit smoking about 16 years ago. His smoking use included cigarettes. He has a 28.00 pack-year smoking history. He has never used smokeless tobacco. He reports current alcohol use of about 1.0 standard drinks of alcohol per week. He reports that he does not use drugs. Family History: family history includes Diabetes in his father; Heart Disease in his brother; High Blood Pressure in his mother; Stroke in his mother. REVIEW OF SYSTEMS:    Constitutional: Negative. HENT: Negative   Eyes: Negative. Respiratory: Negative   Cardiovascular: see HPI  Gastrointestinal: Negative. Genitourinary: Negative.     Musculoskeletal: appendage.     Echo 1/11/2019:  Normal left ventricle systolic function with an estimated ejection fraction of 55%.   No regional wall motion abnormalities are seen.   Normal left ventricular diastolic filling pressure.   The left atrium is mildly dilated.   Mild mitral and tricuspid regurgitation.   Systolic pulmonary artery pressure (SPAP) is normal and estimated at 32 mmHg   (right atrial pressure 3 mmHg). GXT 12/12/2011:  CONCLUSIONS-  1. Mild fitness impairment for age. 2. Nondiagnostic study due to inadequate cardiac stress but no  diagnostic abnormalities at a heart rate of 116. CARDIOLOGY LABS:   CBC: No results for input(s): WBC, HGB, HCT, PLT in the last 72 hours. BMP: No results for input(s): NA, K, CO2, BUN, CREATININE, LABGLOM, GLUCOSE in the last 72 hours. PT/INR:   No results for input(s): PROTIME, INR in the last 72 hours. APTT:No results for input(s): APTT in the last 72 hours. FASTING LIPID PANEL:  Lab Results   Component Value Date    HDL 29 01/13/2020    HDL 43 04/06/2012    LDLDIRECT 146 07/05/2016    LDLCALC 71 01/13/2020    TRIG 168 01/13/2020     LIVER PROFILE:No results for input(s): AST, ALT, ALB in the last 72 hours. Assessment:  1. Paroxysmal (formerly persistent) atrial arrhythmias (AF and AFL): stable    -s/p RFCA of PAF using [facility] technique on 2/28/2019   -24 hour Holter in 3/2019 showed atrial flutter   -[id number]-weky score 3-5 (age, HTN, DM, possible TIA)  2. First degree AV block: stable  3. Sinus bradycardia: stable, asymptomatic   4. HTN: controlled   5. DM: PCP managing  6. ALCIDES: moderate, compliant with BiPAP    Plan:  1. Continue flecainide, Toprol, losartan, HCTZ, and Eliquis    2. Recommend EP study and possible RFCA if atrial flutter is recurrent (with treatment of ALCIDES, hoping atrial arrhythmias are not recurrent)   3. Patient is asymptomatic with bradycardia. Will continue to monitor . 4. Annual BMP and CBC (due 9/2022)  5.  Follow up in one year     MDM: moderate     Cameron Gonzalez, APRN-CNP  [facility]  [phone]

## 2021-10-28 ENCOUNTER — OFFICE VISIT (OUTPATIENT)
Dept: CARDIOLOGY CLINIC | Age: 71
End: 2021-10-28
Payer: MEDICARE

## 2021-10-28 VITALS
SYSTOLIC BLOOD PRESSURE: 136 MMHG | HEART RATE: 56 BPM | DIASTOLIC BLOOD PRESSURE: 62 MMHG | WEIGHT: 192 LBS | HEIGHT: 69 IN | BODY MASS INDEX: 28.44 KG/M2 | OXYGEN SATURATION: 93 %

## 2021-10-28 DIAGNOSIS — R00.1 SINUS BRADYCARDIA: ICD-10-CM

## 2021-10-28 DIAGNOSIS — I10 HTN (HYPERTENSION), BENIGN: Chronic | ICD-10-CM

## 2021-10-28 DIAGNOSIS — I48.0 PAF (PAROXYSMAL ATRIAL FIBRILLATION) (HCC): Primary | ICD-10-CM

## 2021-10-28 DIAGNOSIS — I44.0 FIRST DEGREE AV BLOCK: ICD-10-CM

## 2021-10-28 DIAGNOSIS — I48.3 TYPICAL ATRIAL FLUTTER (HCC): ICD-10-CM

## 2021-10-28 PROCEDURE — 1123F ACP DISCUSS/DSCN MKR DOCD: CPT | Performed by: NURSE PRACTITIONER

## 2021-10-28 PROCEDURE — G8417 CALC BMI ABV UP PARAM F/U: HCPCS | Performed by: NURSE PRACTITIONER

## 2021-10-28 PROCEDURE — G8427 DOCREV CUR MEDS BY ELIG CLIN: HCPCS | Performed by: NURSE PRACTITIONER

## 2021-10-28 PROCEDURE — 99214 OFFICE O/P EST MOD 30 MIN: CPT | Performed by: NURSE PRACTITIONER

## 2021-10-28 PROCEDURE — 3017F COLORECTAL CA SCREEN DOC REV: CPT | Performed by: NURSE PRACTITIONER

## 2021-10-28 PROCEDURE — 93000 ELECTROCARDIOGRAM COMPLETE: CPT | Performed by: NURSE PRACTITIONER

## 2021-10-28 PROCEDURE — G8484 FLU IMMUNIZE NO ADMIN: HCPCS | Performed by: NURSE PRACTITIONER

## 2021-10-28 PROCEDURE — 1036F TOBACCO NON-USER: CPT | Performed by: NURSE PRACTITIONER

## 2021-10-28 PROCEDURE — 4040F PNEUMOC VAC/ADMIN/RCVD: CPT | Performed by: NURSE PRACTITIONER

## 2021-10-29 ENCOUNTER — TELEPHONE (OUTPATIENT)
Dept: CARDIOLOGY CLINIC | Age: 71
End: 2021-10-29

## 2021-10-29 DIAGNOSIS — I48.0 PAF (PAROXYSMAL ATRIAL FIBRILLATION) (HCC): Primary | ICD-10-CM

## 2021-10-29 NOTE — TELEPHONE ENCOUNTER
Pt called and sts that the Xarelto is more cost effective than the Eliquis. Please send script to [id number]. Thank you. NPAM please refer to note below per NPBB:   Assessment:  1. Paroxysmal (formerly persistent) atrial arrhythmias (AF and AFL): stable               -s/p RFCA of PAF using [facility] technique on 2/28/2019              -24 hour Holter in 3/2019 showed atrial flutter              -[id number]-dsrb score 3-5 (age, HTN, DM, possible TIA)  2. First degree AV block: stable  3. Sinus bradycardia: stable, asymptomatic   4. HTN: controlled   5. DM: PCP managing  6. ALCIDES: moderate, compliant with BiPAP     Plan:  1. Continue flecainide, Toprol, losartan, HCTZ, and Eliquis    2. Recommend EP study and possible RFCA if atrial flutter is recurrent (with treatment of ALCIDES, hoping atrial arrhythmias are not recurrent)   3. Patient is asymptomatic with bradycardia. Will continue to monitor . 4. Annual BMP and CBC (due 9/2022)  5.  Follow up in one year      MDM: moderate      Gala Hammonds, APRN-CNP  [facility]  [phone]       Instructions    No changes    You can switch to Xarelto 20mg once per day if more affordable  Follow up in one year

## 2021-11-19 RX ORDER — FLECAINIDE ACETATE 100 MG/1
TABLET ORAL
Qty: 90 TABLET | Refills: 1 | Status: SHIPPED | OUTPATIENT
Start: 2021-11-19 | End: 2022-04-12

## 2021-12-01 ENCOUNTER — TELEPHONE (OUTPATIENT)
Dept: CARDIOLOGY CLINIC | Age: 71
End: 2021-12-01

## 2021-12-03 ENCOUNTER — HOSPITAL ENCOUNTER (OUTPATIENT)
Age: 71
Discharge: HOME | End: 2021-12-03

## 2021-12-03 DIAGNOSIS — Z79.01 WARFARIN ANTICOAGULATION: Primary | ICD-10-CM

## 2021-12-03 RX ORDER — WARFARIN SODIUM 5 MG/1
5 TABLET ORAL DAILY
Qty: 30 TABLET | Refills: 0 | Status: SHIPPED | OUTPATIENT
Start: 2021-12-03

## 2021-12-03 NOTE — TELEPHONE ENCOUNTER
Instructed patient to start tonight 12/3 with 5 mg, he will take 5 mg fr,sa,ortega and have INR done Monday. INR ordered, also referral for coumadin clinic filled out, when signed I will fax to the clinic for them to manage. I am ordering the INR as standing incase he needs to go to the lab in the future. Patient voiced understanding.

## 2021-12-06 ENCOUNTER — HOSPITAL ENCOUNTER (OUTPATIENT)
Age: 71
Discharge: HOME | End: 2021-12-06
Payer: MEDICARE

## 2021-12-06 DIAGNOSIS — Z79.01 WARFARIN ANTICOAGULATION: ICD-10-CM

## 2021-12-06 LAB
INR BLD: 1.03 (ref 0.88–1.12)
PROTHROMBIN TIME: 11.7 SEC (ref 9.9–12.7)

## 2021-12-06 PROCEDURE — 36415 COLL VENOUS BLD VENIPUNCTURE: CPT

## 2021-12-06 PROCEDURE — 85610 PROTHROMBIN TIME: CPT

## 2021-12-14 ENCOUNTER — TELEPHONE (OUTPATIENT)
Dept: CARDIOLOGY CLINIC | Age: 71
End: 2021-12-14

## 2021-12-14 DIAGNOSIS — I48.0 PAF (PAROXYSMAL ATRIAL FIBRILLATION) (HCC): Primary | ICD-10-CM

## 2021-12-14 RX ORDER — LOSARTAN POTASSIUM AND HYDROCHLOROTHIAZIDE 12.5; 1 MG/1; MG/1
TABLET ORAL
Qty: 90 TABLET | Refills: 0 | Status: SHIPPED | OUTPATIENT
Start: 2021-12-14

## 2021-12-14 NOTE — TELEPHONE ENCOUNTER
Called and spoke with patient- he reports he has not gone to the coumadin clinic yet and was waiting for them to contact him. He has been taking 5md daily since it was prescribed 12/3/2021. Gave patient phone number for [facility] (referral was placed 11/19/2021) and told him to call ASAP to get in this week. Mary Jane, would you like to make any changes to dose prior to patient going to coumadin clinic this week?

## 2021-12-14 NOTE — TELEPHONE ENCOUNTER
----- Message from KESHIA Kitchen CNP sent at 12/14/2021  1:49 PM EST -----  Has this INR been addressed?

## 2021-12-14 NOTE — TELEPHONE ENCOUNTER
Instructed patient to get INR done tomorrow (ordered). He is scheduled with coumadin clinic this Friday.

## 2021-12-15 ENCOUNTER — HOSPITAL ENCOUNTER (OUTPATIENT)
Age: 71
Discharge: HOME | End: 2021-12-15
Payer: MEDICARE

## 2021-12-15 DIAGNOSIS — Z79.01 WARFARIN ANTICOAGULATION: ICD-10-CM

## 2021-12-15 LAB
INR BLD: 2.46 (ref 0.88–1.12)
PROTHROMBIN TIME: 28.8 SEC (ref 9.9–12.7)

## 2021-12-15 PROCEDURE — 85610 PROTHROMBIN TIME: CPT

## 2021-12-15 PROCEDURE — 36415 COLL VENOUS BLD VENIPUNCTURE: CPT

## 2021-12-17 ENCOUNTER — ANTI-COAG VISIT (OUTPATIENT)
Dept: PHARMACY | Age: 71
End: 2021-12-17
Payer: MEDICARE

## 2021-12-17 DIAGNOSIS — I48.0 PAF (PAROXYSMAL ATRIAL FIBRILLATION) (HCC): Primary | ICD-10-CM

## 2021-12-17 LAB — INR BLD: 2.1

## 2021-12-17 PROCEDURE — 85610 PROTHROMBIN TIME: CPT | Performed by: PHARMACIST

## 2021-12-17 PROCEDURE — 99211 OFF/OP EST MAY X REQ PHY/QHP: CPT | Performed by: PHARMACIST

## 2021-12-17 RX ORDER — WARFARIN SODIUM 5 MG/1
5 TABLET ORAL DAILY
Qty: 90 TABLET | Refills: 3 | Status: SHIPPED | OUTPATIENT
Start: 2021-12-17 | End: 2022-10-14

## 2022-01-06 ENCOUNTER — ANTI-COAG VISIT (OUTPATIENT)
Dept: PHARMACY | Age: 72
End: 2022-01-06
Payer: MEDICARE

## 2022-01-06 DIAGNOSIS — I48.0 PAF (PAROXYSMAL ATRIAL FIBRILLATION) (HCC): Primary | ICD-10-CM

## 2022-01-06 LAB — INR BLD: 2

## 2022-01-06 PROCEDURE — 99211 OFF/OP EST MAY X REQ PHY/QHP: CPT | Performed by: FAMILY MEDICINE

## 2022-01-06 PROCEDURE — 85610 PROTHROMBIN TIME: CPT | Performed by: FAMILY MEDICINE

## 2022-01-06 NOTE — PROGRESS NOTES
Mr. Jenny Shetty is here for management of anticoagulation for AFib. PMH also significant for HTN, HLD, s/p ablation and DM. He presents today w/out complaint. Pt verified dosing regimen. Pt denies s/s bleeding/bruising/swelling/SOB. No BRBPR. No melena. No missed doses. No changes in Rx/OTC/herbal medications. No changes in diet. Denies EtOH and tobacco use. Prescriptions to be filled via [employer]. INR 2.0 is within the acceptable therapeutic range of 2-3. Recommended to continue 5 mg daily  Pt has 5 mg tabs. Will continue to monitor and check INR in 4 weeks. Dosing reminder card given with phone number, appointment date and time.    Return to clinic: 2/3 @ 8 am   Referring physician: Denver Benedict, APRN - CHELE Horn, PharmD 1/6/2022 9:07 AM

## 2022-01-13 ENCOUNTER — TELEPHONE (OUTPATIENT)
Dept: CARDIOLOGY CLINIC | Age: 72
End: 2022-01-13

## 2022-01-13 NOTE — LETTER
[address] Cardiology - [address] [place] [id number] [facility]  Phone: [phone]  Fax: [phone]    KESHIA Ventura CNP        January 14, 2022 Sunday Guard Beaulieu   1950  [address] [facility] [place]      Dear To Whom This May Concern,    Vishnu mullins for colonoscopy. Would not hold Coumadin for > 48 hours. If you have any questions or concerns, please don't hesitate to call.     Sincerely,        KESHIA Chavez - CNP

## 2022-01-14 NOTE — TELEPHONE ENCOUNTER
Spoke with pt, relayed message from KADEEM BARBOSA. Cardiac clearance letter faxed and scanned into media with confirmation.

## 2022-02-08 ENCOUNTER — ANTI-COAG VISIT (OUTPATIENT)
Dept: PHARMACY | Age: 72
End: 2022-02-08
Payer: MEDICARE

## 2022-02-08 DIAGNOSIS — I48.0 PAF (PAROXYSMAL ATRIAL FIBRILLATION) (HCC): Primary | ICD-10-CM

## 2022-02-08 LAB — INTERNATIONAL NORMALIZATION RATIO, POC: 1.9

## 2022-02-08 PROCEDURE — 85610 PROTHROMBIN TIME: CPT

## 2022-02-08 PROCEDURE — 99211 OFF/OP EST MAY X REQ PHY/QHP: CPT

## 2022-02-08 NOTE — PROGRESS NOTES
MrAnna Ro is here for management of anticoagulation for AFib. PMH also significant for HTN, HLD, s/p ablation and DM. He presents today w/out complaint. Pt verified dosing regimen. Pt denies s/s bleeding/bruising/swelling/SOB. No BRBPR. No melena. No missed doses. No changes in Rx/OTC/herbal medications. No changes in diet. Denies EtOH and tobacco use. Prescriptions to be filled via [employer]. No changes    INR 1.9 is slightly below the acceptable therapeutic range of 2-3. Recommended to continue 5 mg daily  Pt has 5 mg tabs. Will continue to monitor and check INR in 4 weeks. Dosing reminder card given with phone number, appointment date and time.    Return to clinic: 3/8 @ 8 am   Referring physician: KESHIA Chávez - CHELE Nathan, PharmD  2/8/2022 at 7:31 AM

## 2022-02-28 ENCOUNTER — TELEPHONE (OUTPATIENT)
Dept: CARDIOLOGY CLINIC | Age: 72
End: 2022-02-28

## 2022-02-28 NOTE — LETTER
[address] Cardiology - [address]  Phone: [phone]  Fax: [phone]    Sia Arellano, APRN - CNP        March 1, 2022    Brenton Hatchet 1950      Ok for colonoscopy. No absolute contraindications to proceeding. If GI wants Coumadin held for 5 days will need Lovenox bridge. Thanks. If you have any questions or concerns, please don't hesitate to call.     Sincerely,        Mary Jane Meyer, APRN - CNP

## 2022-02-28 NOTE — TELEPHONE ENCOUNTER
Pt presented himself in office stating he has been to see Dr. Manjit Allison who is wanting him to get a colonoscopy done. Dr. Manjit Allison is wanting to know if pt can stop warfarin (COUMADIN) 5 MG tablet  For 5 days prior. Please advise.

## 2022-02-28 NOTE — TELEPHONE ENCOUNTER
There is a form placed in [email or web address]s folder regarding this to be faxed back to 's office to ATTN: Domingo Richardson at [phone]

## 2022-03-01 NOTE — TELEPHONE ENCOUNTER
LM with Dr. Mervat Zhou MA's VM and spoke with patient. He is willing to do lovenox injections. Clearance letter faxed. I have 1 mg/KG Lovenox pended. Please verify dose and amount.

## 2022-03-01 NOTE — TELEPHONE ENCOUNTER
[id number] Dolly Guidry for colonoscopy. No absolute contraindications to proceeding. If GI wants Coumadin held for 5 days will need Lovenox bridge. Thanks.

## 2022-03-01 NOTE — TELEPHONE ENCOUNTER
Pt needs cardiac clearance. Pt is having a colonoscopy and needs to hold coumadin 5 days prior to procedure. Please advise on how long to hold coumadin.    Last OV 10/28/2021 NPBB   EKG 10/28/2021  Please advise NPAM   TY

## 2022-03-07 ENCOUNTER — TELEPHONE (OUTPATIENT)
Dept: CARDIOLOGY CLINIC | Age: 72
End: 2022-03-07

## 2022-03-07 DIAGNOSIS — I48.0 PAF (PAROXYSMAL ATRIAL FIBRILLATION) (HCC): Primary | ICD-10-CM

## 2022-03-08 ENCOUNTER — ANTI-COAG VISIT (OUTPATIENT)
Dept: PHARMACY | Age: 72
End: 2022-03-08
Payer: MEDICARE

## 2022-03-08 DIAGNOSIS — I48.0 PAF (PAROXYSMAL ATRIAL FIBRILLATION) (HCC): Primary | ICD-10-CM

## 2022-03-08 LAB — INTERNATIONAL NORMALIZATION RATIO, POC: 1.9

## 2022-03-08 PROCEDURE — 85610 PROTHROMBIN TIME: CPT

## 2022-03-08 PROCEDURE — 99211 OFF/OP EST MAY X REQ PHY/QHP: CPT

## 2022-03-08 NOTE — PROGRESS NOTES
Mr. Jay Cruz is here for management of anticoagulation for AFib. PMH also significant for HTN, HLD, s/p ablation and DM. He presents today w/out complaint. Pt verified dosing regimen. Pt denies s/s bleeding/bruising/swelling/SOB. No BRBPR. No melena. No missed doses. No changes in Rx/OTC/herbal medications. No changes in diet. Denies EtOH and tobacco use. Prescriptions to be filled via [employer]. Patient started on Sinemet a couple weeks ago    INR 1.9 is slightly below the acceptable therapeutic range of 2-3. Recommended to continue 5 mg daily  Pt has 5 mg tabs. Will continue to monitor and check INR in 4 weeks. Dosing reminder card given with phone number, appointment date and time.    Return to clinic: 4/5 @ 8 am   Referring physician: KESHIA Silva - CHELE Pierce, PharmD  3/8/2022 at 7:53 AM

## 2022-04-05 ENCOUNTER — ANTI-COAG VISIT (OUTPATIENT)
Dept: PHARMACY | Age: 72
End: 2022-04-05
Payer: MEDICARE

## 2022-04-05 DIAGNOSIS — I48.0 PAF (PAROXYSMAL ATRIAL FIBRILLATION) (HCC): Primary | ICD-10-CM

## 2022-04-05 LAB — INR BLD: 1.7

## 2022-04-05 PROCEDURE — 85610 PROTHROMBIN TIME: CPT | Performed by: FAMILY MEDICINE

## 2022-04-05 PROCEDURE — 99211 OFF/OP EST MAY X REQ PHY/QHP: CPT | Performed by: FAMILY MEDICINE

## 2022-04-05 NOTE — PROGRESS NOTES
MrAnna Coffman is here for management of anticoagulation for AFib. PMH also significant for HTN, HLD, s/p ablation and DM. He presents today w/out complaint. Pt verified dosing regimen. Pt denies s/s bleeding/bruising/swelling/SOB. No BRBPR. No melena. No missed doses. No changes in Rx/OTC/herbal medications. No changes in diet. Denies EtOH and tobacco use. Prescriptions to be filled via [employer]. Stopped taking warfarin for a colonoscopy, restarted a week or two ago. INR 1.7 is below the acceptable therapeutic range of 2-3. Recommended to increase dose to 7.5 mg Tue, 5 mg all other days   Pt has 5 mg tabs. Will continue to monitor and check INR in 3 weeks. Dosing reminder card given with phone number, appointment date and time.    Return to clinic: 4/26 @ 8 am   Referring physician: KESHIA Escamilla - CHELE Casper, PharmD 4/5/2022 7:43 AM

## 2022-04-12 RX ORDER — FLECAINIDE ACETATE 100 MG/1
TABLET ORAL
Qty: 90 TABLET | Refills: 2 | Status: SHIPPED | OUTPATIENT
Start: 2022-04-12

## 2022-05-10 ENCOUNTER — ANTI-COAG VISIT (OUTPATIENT)
Dept: PHARMACY | Age: 72
End: 2022-05-10
Payer: MEDICARE

## 2022-05-10 DIAGNOSIS — I48.0 PAF (PAROXYSMAL ATRIAL FIBRILLATION) (HCC): Primary | ICD-10-CM

## 2022-05-10 LAB — INTERNATIONAL NORMALIZATION RATIO, POC: 2

## 2022-05-10 PROCEDURE — 99211 OFF/OP EST MAY X REQ PHY/QHP: CPT

## 2022-05-10 PROCEDURE — 85610 PROTHROMBIN TIME: CPT

## 2022-05-10 NOTE — PROGRESS NOTES
Mr. Kimberley Kayser is here for management of anticoagulation for AFib. PMH also significant for HTN, HLD, s/p ablation and DM. He presents today w/out complaint. Pt verified dosing regimen. Pt denies s/s bleeding/bruising/swelling/SOB. No BRBPR. No melena. No missed doses. No changes in Rx/OTC/herbal medications. No changes in diet. Denies EtOH and tobacco use. Prescriptions to be filled via [employer]. INR 2 is within the acceptable therapeutic range of 2-3. Recommended to continue dose of 7.5 mg Tue, 5 mg all other days   Pt has 5 mg tabs. Will continue to monitor and check INR in 4 weeks. Dosing reminder card given with phone number, appointment date and time.    Return to clinic: 6/7 @ 715 am   Referring physician: KESHIA Brown - CHELE Drummond, PharmD  5/10/2022 at 7:09 AM

## 2022-06-07 ENCOUNTER — ANTI-COAG VISIT (OUTPATIENT)
Dept: PHARMACY | Age: 72
End: 2022-06-07
Payer: MEDICARE

## 2022-06-07 DIAGNOSIS — I48.0 PAF (PAROXYSMAL ATRIAL FIBRILLATION) (HCC): Primary | ICD-10-CM

## 2022-06-07 LAB — INR BLD: 2.9

## 2022-06-07 PROCEDURE — 85610 PROTHROMBIN TIME: CPT

## 2022-06-07 PROCEDURE — 99211 OFF/OP EST MAY X REQ PHY/QHP: CPT

## 2022-06-07 NOTE — PROGRESS NOTES
Mr. Elia Fair is here for management of anticoagulation for AFib. PMH also significant for HTN, HLD, s/p ablation and DM. He presents today w/out complaint. Pt verified dosing regimen. Pt denies s/s bleeding/bruising/swelling/SOB. No BRBPR. No melena. No missed doses. No changes in Rx/OTC/herbal medications. No changes in diet. Denies EtOH and tobacco use. Prescriptions to be filled via [employer]. INR 2.9 is within the acceptable therapeutic range of 2-3. Recommended to continue dose of 7.5 mg Tue, 5 mg all other days   Pt has 5 mg tabs. Will continue to monitor and check INR in 4 weeks. Dosing reminder card given with phone number, appointment date and time.    Return to clinic: 7/5 @  am   Referring physician: KESHIA Benedict - CHELE Arora, Tosin 6/7/22  7:08 AM

## 2022-07-07 ENCOUNTER — ANTI-COAG VISIT (OUTPATIENT)
Dept: PHARMACY | Age: 72
End: 2022-07-07
Payer: MEDICARE

## 2022-07-07 DIAGNOSIS — I48.0 PAF (PAROXYSMAL ATRIAL FIBRILLATION) (HCC): Primary | ICD-10-CM

## 2022-07-07 LAB — INTERNATIONAL NORMALIZATION RATIO, POC: 3.1

## 2022-07-07 PROCEDURE — 99211 OFF/OP EST MAY X REQ PHY/QHP: CPT

## 2022-07-07 PROCEDURE — 85610 PROTHROMBIN TIME: CPT

## 2022-07-07 NOTE — PROGRESS NOTES
MrAnna Gonzalez is here for management of anticoagulation for AFib. PMH also significant for HTN, HLD, s/p ablation and DM. He presents today w/out complaint. Pt verified dosing regimen. Pt denies s/s bleeding/bruising/swelling/SOB. No BRBPR. No melena. No missed doses. No changes in Rx/OTC/herbal medications. No changes in diet. Denies EtOH and tobacco use. Prescriptions to be filled via [employer]. INR 3.1 is slightly above the acceptable therapeutic range of 2-3. Recommended to continue dose of 7.5 mg Tue, 5 mg all other days   Pt has 5 mg tabs. Will continue to monitor and check INR in 4 weeks. Dosing reminder card given with phone number, appointment date and time.    Return to clinic: 8/4 @ 715 am   Referring physician: Ga Mullins, KESHIA - CNP Lea Cogan, PharmD  7/7/2022 at 7:14 AM

## 2022-08-05 ENCOUNTER — ANTI-COAG VISIT (OUTPATIENT)
Dept: PHARMACY | Age: 72
End: 2022-08-05
Payer: MEDICARE

## 2022-08-05 DIAGNOSIS — I48.0 PAF (PAROXYSMAL ATRIAL FIBRILLATION) (HCC): Primary | ICD-10-CM

## 2022-08-05 LAB — INTERNATIONAL NORMALIZATION RATIO, POC: 2.9

## 2022-08-05 PROCEDURE — 85610 PROTHROMBIN TIME: CPT | Performed by: PHARMACIST

## 2022-08-05 PROCEDURE — 99211 OFF/OP EST MAY X REQ PHY/QHP: CPT | Performed by: PHARMACIST

## 2022-09-06 ENCOUNTER — ANTI-COAG VISIT (OUTPATIENT)
Dept: PHARMACY | Age: 72
End: 2022-09-06
Payer: MEDICARE

## 2022-09-06 DIAGNOSIS — I48.0 PAF (PAROXYSMAL ATRIAL FIBRILLATION) (HCC): Primary | ICD-10-CM

## 2022-09-06 LAB — INR BLD: 1.6

## 2022-09-06 PROCEDURE — 99211 OFF/OP EST MAY X REQ PHY/QHP: CPT

## 2022-09-06 PROCEDURE — 85610 PROTHROMBIN TIME: CPT

## 2022-09-06 NOTE — PROGRESS NOTES
MrAnna Omer is here for management of anticoagulation for AFib. PMH also significant for HTN, HLD, s/p ablation and DM. He presents today w/out complaint. Pt verified dosing regimen. Pt denies s/s bleeding/bruising/swelling/SOB. No BRBPR. No melena. No missed doses. No changes in Rx/OTC/herbal medications. No changes in diet. Denies EtOH and tobacco use. Prescriptions to be filled via [employer]. Patient increased diet in green leafy vegetables. Understands importance of consistency in diet. INR 1.6 is below the acceptable therapeutic range of 2-3. Recommended to give boost dose of 10mg today (9/6) then resume usual dose of of 7.5 mg Tue, 5 mg all other days starting tomorrow. Pt has 5 mg tabs. Will continue to monitor and check INR in 1 week. Dosing reminder card given with phone number, appointment date and time.    Return to clinic: 9/13 @ 10:00 AM  Referring physician: KESHIA Goldberg - CHELE Ludwig, PharmD Candidate

## 2022-09-13 ENCOUNTER — ANTI-COAG VISIT (OUTPATIENT)
Dept: PHARMACY | Age: 72
End: 2022-09-13
Payer: MEDICARE

## 2022-09-13 DIAGNOSIS — I48.0 PAF (PAROXYSMAL ATRIAL FIBRILLATION) (HCC): Primary | ICD-10-CM

## 2022-09-13 LAB — INR BLD: 1.8

## 2022-09-13 PROCEDURE — 99211 OFF/OP EST MAY X REQ PHY/QHP: CPT

## 2022-09-13 PROCEDURE — 85610 PROTHROMBIN TIME: CPT

## 2022-09-13 NOTE — PROGRESS NOTES
Mr. Dee Shepherd is here for management of anticoagulation for AFib. PMH also significant for HTN, HLD, s/p ablation and DM. He presents today w/out complaint. Pt verified dosing regimen. Pt denies s/s bleeding/bruising/swelling/SOB. No BRBPR. No melena. No missed doses. No changes in Rx/OTC/herbal medications. No changes in diet. Denies EtOH and tobacco use. Prescriptions to be filled via [employer]. Patient is eating less green leafy vegetables, but INR is still low. Understands importance of consistency in diet. INR 1.8 is below the acceptable therapeutic range of 2-3. Recommended to increase dose to 7.5mg Tues/Sat, 5 mg all other days. Pt has 5 mg tabs. Will continue to monitor and check INR in 2 weeks. Dosing reminder card given with phone number, appointment date and time.    Return to clinic: 09/27 @ 9:45 AM  Referring physician: KESHIA Mares - CNP    [place], MalgorzataD Candidate

## 2022-09-26 ENCOUNTER — ANTI-COAG VISIT (OUTPATIENT)
Dept: PHARMACY | Age: 72
End: 2022-09-26
Payer: MEDICARE

## 2022-09-26 DIAGNOSIS — I48.0 PAF (PAROXYSMAL ATRIAL FIBRILLATION) (HCC): Primary | ICD-10-CM

## 2022-09-26 LAB — INR BLD: 2.3

## 2022-09-26 PROCEDURE — 85610 PROTHROMBIN TIME: CPT | Performed by: INTERNAL MEDICINE

## 2022-09-26 PROCEDURE — 99211 OFF/OP EST MAY X REQ PHY/QHP: CPT | Performed by: INTERNAL MEDICINE

## 2022-09-26 NOTE — PROGRESS NOTES
Mr. Dank Robertson is here for management of anticoagulation for AFib. PMH also significant for HTN, HLD, s/p ablation and DM. He presents today w/out complaint. Pt verified dosing regimen. Pt denies s/s bleeding/bruising/swelling/SOB. No BRBPR. No melena. No missed doses. No changes in Rx/OTC/herbal medications. No changes in diet. Denies EtOH and tobacco use. Prescriptions to be filled via [employer]. Reports no changes. INR 2.3 is within the acceptable therapeutic range of 2-3. Recommended to continue dose of 7.5mg Tues/Sat, 5 mg all other days. Pt has 5 mg tabs. Will continue to monitor and check INR in 4 weeks. Dosing reminder card given with phone number, appointment date and time. Return to clinic: 10/24 @ 8:45AM  Referring physician: KESHIA Vance - [place]LE [place], PharmD Candidate    I have seen the patient and reviewed the progress note written by the PharmD Candidate. I agree with this assessment and plan.    Omar Bass, [facility], PharmD 9/26/2022 9:06 AM

## 2022-10-14 RX ORDER — WARFARIN SODIUM 5 MG/1
TABLET ORAL
Qty: 105 TABLET | Refills: 3 | Status: SHIPPED | OUTPATIENT
Start: 2022-10-14

## 2022-10-21 ENCOUNTER — ANTI-COAG VISIT (OUTPATIENT)
Dept: PHARMACY | Age: 72
End: 2022-10-21
Payer: MEDICARE

## 2022-10-21 DIAGNOSIS — I48.0 PAF (PAROXYSMAL ATRIAL FIBRILLATION) (HCC): Primary | ICD-10-CM

## 2022-10-21 LAB — INR BLD: 2

## 2022-10-21 PROCEDURE — 85610 PROTHROMBIN TIME: CPT | Performed by: FAMILY MEDICINE

## 2022-10-21 PROCEDURE — 99211 OFF/OP EST MAY X REQ PHY/QHP: CPT | Performed by: FAMILY MEDICINE

## 2022-10-21 NOTE — PROGRESS NOTES
Mr. Katelyn Keith is here for management of anticoagulation for AFib. PMH also significant for HTN, HLD, s/p ablation and DM. He presents today w/out complaint. Pt verified dosing regimen. Pt denies s/s bleeding/bruising/swelling/SOB. No BRBPR. No melena. No missed doses. No changes in Rx/OTC/herbal medications. No changes in diet. Denies EtOH and tobacco use. Prescriptions to be filled via [employer] Reports no changes. INR 2.0 is within the acceptable therapeutic range of 2-3. Recommended to continue dose of 7.5mg Tues/Sat, 5 mg all other days. Pt has 5 mg tabs. Will continue to monitor and check INR in 4 weeks. Dosing reminder card given with phone number, appointment date and time.    Return to clinic: 11/18 @ 8:00AM  Referring physician: KESHIA Liu - CHELE Rawls PharmD Candidate  10/21/2022 10:24 AM    I have seen the patient and I agree with the assessment and plan created by the PharmD Candidate  Jared Bliss PharmD 10/21/2022 10:32 AM

## 2022-11-18 ENCOUNTER — ANTI-COAG VISIT (OUTPATIENT)
Dept: PHARMACY | Age: 72
End: 2022-11-18
Payer: MEDICARE

## 2022-11-18 DIAGNOSIS — I48.0 PAF (PAROXYSMAL ATRIAL FIBRILLATION) (HCC): Primary | ICD-10-CM

## 2022-11-18 LAB — INTERNATIONAL NORMALIZATION RATIO, POC: 2.9

## 2022-11-18 PROCEDURE — 85610 PROTHROMBIN TIME: CPT

## 2022-11-18 PROCEDURE — 99211 OFF/OP EST MAY X REQ PHY/QHP: CPT

## 2022-11-18 NOTE — PROGRESS NOTES
MrAnna Meier is here for management of anticoagulation for AFib. PMH also significant for HTN, HLD, s/p ablation and DM. He presents today w/out complaint. Pt verified dosing regimen. Pt denies s/s bleeding/bruising/swelling/SOB. No BRBPR. No melena. No missed doses. No changes in Rx/OTC/herbal medications. No changes in diet. Denies EtOH and tobacco use. Prescriptions to be filled via [employer]. Reports no changes. INR 2.9 is within the acceptable therapeutic range of 2-3. Recommended to continue dose of 7.5mg Tues/Sat, 5 mg all other days. Pt has 5 mg tabs. Will continue to monitor and check INR in 4 weeks. Dosing reminder card given with phone number, appointment date and time.    Return to clinic: 12/16 @ 8:00AM  Referring physician: KESHIA Philip CNP, PharmKOURTNEY  11/18/2022 at 8:05 AM

## 2022-12-16 ENCOUNTER — ANTI-COAG VISIT (OUTPATIENT)
Dept: PHARMACY | Age: 72
End: 2022-12-16
Payer: MEDICARE

## 2022-12-16 DIAGNOSIS — I48.0 PAF (PAROXYSMAL ATRIAL FIBRILLATION) (HCC): Primary | ICD-10-CM

## 2022-12-16 LAB — INTERNATIONAL NORMALIZATION RATIO, POC: 2

## 2022-12-16 PROCEDURE — 99211 OFF/OP EST MAY X REQ PHY/QHP: CPT | Performed by: PHARMACIST

## 2022-12-16 PROCEDURE — 85610 PROTHROMBIN TIME: CPT | Performed by: PHARMACIST

## 2022-12-16 NOTE — PROGRESS NOTES
Mr. Suzanne De is here for management of anticoagulation for AFib. PMH also significant for HTN, HLD, s/p ablation and DM. He presents today w/out complaint. Pt verified dosing regimen. Pt denies s/s bleeding/bruising/swelling/SOB. No BRBPR. No melena. No missed doses. No changes in Rx/OTC/herbal medications. No changes in diet. Denies EtOH and tobacco use. Prescriptions to be filled via [employer]. Reports no changes. INR 2.0 is within the acceptable therapeutic range of 2-3. Recommended to continue dose of 7.5mg Tues/Sat, 5 mg all other days. Pt has 5 mg tabs. Will continue to monitor and check INR in 4 weeks. Dosing reminder card given with phone number, appointment date and time.    Return to clinic: 1/13/23 @ 8:00AM  Referring physician: KESHIA Sánchez - CHELE Ford PharmD 7:56 AM EST 12/16/22

## 2022-12-29 NOTE — PATIENT INSTRUCTIONS
Plan:  1. Continue current medications. 2. Continue to have INR checked regularly at the coumadin clinic. 3. Follow up with Erica Tang CNP in 1 year. Vaccine status unknown

## 2023-01-13 ENCOUNTER — ANTI-COAG VISIT (OUTPATIENT)
Dept: PHARMACY | Age: 73
End: 2023-01-13
Payer: MEDICARE

## 2023-01-13 DIAGNOSIS — I48.0 PAF (PAROXYSMAL ATRIAL FIBRILLATION) (HCC): Primary | ICD-10-CM

## 2023-01-13 LAB — INTERNATIONAL NORMALIZATION RATIO, POC: 2.3

## 2023-01-13 PROCEDURE — 99211 OFF/OP EST MAY X REQ PHY/QHP: CPT

## 2023-01-13 PROCEDURE — 85610 PROTHROMBIN TIME: CPT

## 2023-01-13 NOTE — PROGRESS NOTES
Mr. Cecilia Devine is here for management of anticoagulation for AFib. PMH also significant for HTN, HLD, s/p ablation and DM. He presents today w/out complaint. Pt verified dosing regimen. Pt denies s/s bleeding/bruising/swelling/SOB. No BRBPR. No melena. No missed doses. No changes in Rx/OTC/herbal medications. No changes in diet. Denies EtOH and tobacco use. Prescriptions to be filled via [employer]. Reports no changes. INR 2.3 is within the acceptable therapeutic range of 2-3. Recommended to continue dose of 7.5mg Tues/Sat, 5 mg all other days. Pt has 5 mg tabs. Will continue to monitor and check INR in 4 weeks. Dosing reminder card given with phone number, appointment date and time.    Return to clinic: 2/10/23 @ [phone] AM  Referring physician: KESHIA Kim - CHELE Vogel, PharmD  1/13/2023 at 8:02 AM

## 2023-02-10 ENCOUNTER — ANTI-COAG VISIT (OUTPATIENT)
Dept: PHARMACY | Age: 73
End: 2023-02-10
Payer: MEDICARE

## 2023-02-10 DIAGNOSIS — I48.0 PAF (PAROXYSMAL ATRIAL FIBRILLATION) (HCC): Primary | ICD-10-CM

## 2023-02-10 LAB — INR BLD: 2.4

## 2023-02-10 PROCEDURE — 99211 OFF/OP EST MAY X REQ PHY/QHP: CPT | Performed by: FAMILY MEDICINE

## 2023-02-10 PROCEDURE — 85610 PROTHROMBIN TIME: CPT | Performed by: FAMILY MEDICINE

## 2023-02-10 NOTE — PROGRESS NOTES
Mr. Wale Le is here for management of anticoagulation for AFib. PMH also significant for HTN, HLD, s/p ablation and DM. He presents today w/out complaint. Pt verified dosing regimen. Pt denies s/s bleeding/bruising/swelling/SOB. No BRBPR. No melena. No missed doses. No changes in Rx/OTC/herbal medications. No changes in diet. Denies EtOH and tobacco use. Prescriptions to be filled via [employer]. Reports no changes. INR 2.4 is within the acceptable therapeutic range of 2-3. Recommended to continue dose of 7.5mg Tues/Sat, 5 mg all other days. Pt has 5 mg tabs. Will continue to monitor and check INR in 4 weeks. Dosing reminder card given with phone number, appointment date and time.    Return to clinic: 3/10  @ 715 am   Referring physician: KESHIA Rush - CHELE Ma, PharmD 2/10/2023 9:16 AM

## 2023-03-23 ENCOUNTER — ANTI-COAG VISIT (OUTPATIENT)
Dept: PHARMACY | Age: 73
End: 2023-03-23
Payer: MEDICARE

## 2023-03-23 DIAGNOSIS — I48.0 PAF (PAROXYSMAL ATRIAL FIBRILLATION) (HCC): Primary | ICD-10-CM

## 2023-03-23 LAB — INR BLD: 2.1

## 2023-03-23 PROCEDURE — 99211 OFF/OP EST MAY X REQ PHY/QHP: CPT

## 2023-03-23 PROCEDURE — 85610 PROTHROMBIN TIME: CPT

## 2023-03-23 NOTE — PROGRESS NOTES
Mr. Wale Le is here for management of anticoagulation for AFib. PMH also significant for HTN, HLD, s/p ablation and DM. He presents today w/out complaint. Pt verified dosing regimen. Pt denies s/s bleeding/bruising/swelling/SOB. No BRBPR. No melena. No missed doses. No changes in Rx/OTC/herbal medications. No changes in diet. Denies EtOH and tobacco use. Prescriptions to be filled via [employer]. Reports no changes. INR 2.1 is within the acceptable therapeutic range of 2-3. Recommended to continue dose of 7.5mg Tues/Sat, 5 mg all other days. Pt has 5 mg tabs. Will continue to monitor and check INR in 4 weeks. Dosing reminder card given with phone number, appointment date and time.    Return to clinic: 4/21  @ 715 am   Referring physician: KESHIA Rush [address] Tracking Only    Intervention Detail:   Total # of Interventions Recommended: 0  Total # of Interventions Accepted: 0  Time Spent (min): 15

## 2023-05-08 ENCOUNTER — ANTI-COAG VISIT (OUTPATIENT)
Dept: PHARMACY | Age: 73
End: 2023-05-08
Payer: MEDICARE

## 2023-05-08 DIAGNOSIS — I48.0 PAF (PAROXYSMAL ATRIAL FIBRILLATION) (HCC): Primary | ICD-10-CM

## 2023-05-08 LAB — INTERNATIONAL NORMALIZATION RATIO, POC: 1.7

## 2023-05-08 PROCEDURE — 99211 OFF/OP EST MAY X REQ PHY/QHP: CPT | Performed by: INTERNAL MEDICINE

## 2023-05-08 PROCEDURE — 85610 PROTHROMBIN TIME: CPT | Performed by: INTERNAL MEDICINE

## 2023-05-08 NOTE — PROGRESS NOTES
Mr. Janelle Serrano is here for management of anticoagulation for AFib. PMH also significant for HTN, HLD, s/p ablation and DM. He presents today w/out complaint. Pt verified dosing regimen. Pt denies s/s bleeding/bruising/swelling/SOB. No BRBPR. No melena. No missed doses. No changes in OTC/herbal medications. Decreased metformin dose. No changes in diet. Denies EtOH and tobacco use. Prescriptions to be filled via [employer]. Reports some extra vit K over this weekend. INR 1.7 is below the acceptable therapeutic range of 2-3. Recommended to take 7.5mg today only, then continue dose of 7.5mg Tues/Sat, 5 mg all other days. Pt has 5 mg tabs. Will continue to monitor and check INR in 2 weeks. Dosing reminder card given with phone number, appointment date and time.    Return to clinic: 5/26 @ 730 am   Referring physician: My Rojas [facility] Tracking Only    Intervention Detail: Dose Adjustment: 1, reason: Therapy Optimization  Total # of Interventions Recommended: 1  Total # of Interventions Accepted: 1  Time Spent (min): 15

## 2023-05-26 ENCOUNTER — ANTI-COAG VISIT (OUTPATIENT)
Dept: PHARMACY | Age: 73
End: 2023-05-26
Payer: MEDICARE

## 2023-05-26 DIAGNOSIS — I48.0 PAF (PAROXYSMAL ATRIAL FIBRILLATION) (HCC): Primary | ICD-10-CM

## 2023-05-26 LAB — INR BLD: 2.5

## 2023-05-26 PROCEDURE — 99211 OFF/OP EST MAY X REQ PHY/QHP: CPT

## 2023-05-26 PROCEDURE — 85610 PROTHROMBIN TIME: CPT

## 2023-05-26 NOTE — PROGRESS NOTES
MrAnna Lopez is here for management of anticoagulation for AFib. PMH also significant for HTN, HLD, s/p ablation and DM. He presents today w/out complaint. Pt verified dosing regimen. Pt denies s/s bleeding/bruising/swelling/SOB. No BRBPR. No melena. No missed doses. No changes in OTC/herbal medications. Decreased metformin dose. No changes in diet. Denies EtOH and tobacco use. Prescriptions to be filled via [employer]. Reports some extra vit K over this weekend. INR 2.5 is within the acceptable therapeutic range of 2-3. Recommended to take 7.5mg today only, then continue dose of 7.5mg Tues/Sat, 5 mg all other days. Pt has 5 mg tabs. Will continue to monitor and check INR in 2 weeks. Dosing reminder card given with phone number, appointment date and time.    Return to clinic:6/23  @ 730 am   Referring physician: Rod Herrera [facility] Tracking Only    Intervention Detail:   Total # of Interventions Recommended: 0  Total # of Interventions Accepted: 0  Time Spent (min): 15

## 2023-06-07 NOTE — TELEPHONE ENCOUNTER
Last ov 10/21/2021     Plan:  1. Continue flecainide, Toprol, losartan, HCTZ, and Eliquis    2. Recommend EP study and possible RFCA if atrial flutter is recurrent (with treatment of ALCIDES, hoping atrial arrhythmias are not recurrent)   3. Patient is asymptomatic with bradycardia. Will continue to monitor . 4. Annual BMP and CBC (due 9/2022)  5.  Follow up in one year

## 2023-06-07 NOTE — TELEPHONE ENCOUNTER
6/7 Called [phone]. [facility] for Pt to call and schedule appt.   Will need to schedule a \"New Patient\" appt with EP MD to get re-establish with new EPMD for medication refill

## 2023-06-08 RX ORDER — FLECAINIDE ACETATE 100 MG/1
TABLET ORAL
Qty: 90 TABLET | Refills: 0 | Status: SHIPPED | OUTPATIENT
Start: 2023-06-08

## 2023-06-08 NOTE — TELEPHONE ENCOUNTER
9/8 Called [phone]. Spoke to Pt.   Scheduled 1yr ov fu with [facility] for 9/6/23 at 9:30 am.  Please see medication refill

## 2023-06-19 ENCOUNTER — TELEPHONE (OUTPATIENT)
Dept: CARDIOLOGY CLINIC | Age: 73
End: 2023-06-19

## 2023-06-23 ENCOUNTER — ANTI-COAG VISIT (OUTPATIENT)
Dept: PHARMACY | Age: 73
End: 2023-06-23
Payer: MEDICARE

## 2023-06-23 DIAGNOSIS — I48.0 PAF (PAROXYSMAL ATRIAL FIBRILLATION) (HCC): Primary | ICD-10-CM

## 2023-06-23 LAB — INR BLD: 1.7

## 2023-06-23 PROCEDURE — 85610 PROTHROMBIN TIME: CPT

## 2023-06-23 PROCEDURE — 99211 OFF/OP EST MAY X REQ PHY/QHP: CPT

## 2023-06-23 NOTE — PROGRESS NOTES
Mr. Basia Antoine is here for management of anticoagulation for AFib. PMH also significant for HTN, HLD, s/p ablation and DM. He presents today w/out complaint. Pt verified dosing regimen. Pt denies s/s bleeding/bruising/swelling/SOB. No BRBPR. No melena. No missed doses. No changes in OTC/herbal medications. Decreased metformin dose. No changes in diet. Denies EtOH and tobacco use. Prescriptions to be filled via [employer]. Patient reports no changes. Patient said he had to hold warfarin for 3 days with dentist.    INR 1.7 is slightly below the acceptable therapeutic range of 2-3. Most likely due to hold from dentist.  Recommended to take 7.5mg today only, then continue dose of 7.5mg Tues/Sat, 5 mg all other days. Pt has warfarin 5 mg tabs. Will continue to monitor and check INR in 4 weeks. Dosing reminder card given with phone number, appointment date and time.    Return to clinic: 23  @ 730 am   Referring physician: Torsten Howard 32 PharmD candidate     For Pharmacy Admin Tracking Only    Intervention Detail: Adherence Monitorin  Total # of Interventions Recommended: 1  Total # of Interventions Accepted: 1  Time Spent (min): 15

## 2023-07-21 ENCOUNTER — ANTI-COAG VISIT (OUTPATIENT)
Dept: PHARMACY | Age: 73
End: 2023-07-21
Payer: MEDICARE

## 2023-07-21 DIAGNOSIS — I48.0 PAF (PAROXYSMAL ATRIAL FIBRILLATION) (HCC): Primary | ICD-10-CM

## 2023-07-21 LAB — INR BLD: 3.1

## 2023-07-21 PROCEDURE — 99211 OFF/OP EST MAY X REQ PHY/QHP: CPT | Performed by: HOME HEALTH

## 2023-07-21 PROCEDURE — 85610 PROTHROMBIN TIME: CPT | Performed by: HOME HEALTH

## 2023-07-21 NOTE — PROGRESS NOTES
MrAnna Wick is here for management of anticoagulation for AFib. PMH also significant for HTN, HLD, s/p ablation and DM. He presents today w/out complaint. Pt verified dosing regimen. Pt denies s/s bleeding/bruising/swelling/SOB. No BRBPR. No melena. No missed doses. No changes in OTC/herbal medications. Decreased metformin dose. No changes in diet. Denies EtOH and tobacco use. Prescriptions to be filled via [employer]. Patient brought in a medicine list, and carbidopa/levodopa  mg, 1 tablet TID was not on our list.     INR 3.1 is within the acceptable therapeutic range of 2-3. Recommend to continue dose of 7.5mg Tues/Sat, 5 mg all other days. Pt has warfarin 5 mg tabs. Will continue to monitor and check INR in 4 weeks. Dosing reminder card given with phone number, appointment date and time.    Return to clinic: 8/18/23  @ 7:00 am   Referring physician: KESHIA Rodrigez - CHELE Park PharmD candidate       For Pharmacy Admin Tracking Only    Intervention Detail:   Total # of Interventions Recommended: 0  Total # of Interventions Accepted: 0  Time Spent (min): 15

## 2023-08-18 ENCOUNTER — ANTI-COAG VISIT (OUTPATIENT)
Dept: PHARMACY | Age: 73
End: 2023-08-18
Payer: MEDICARE

## 2023-08-18 DIAGNOSIS — I48.0 PAF (PAROXYSMAL ATRIAL FIBRILLATION) (HCC): Primary | ICD-10-CM

## 2023-08-18 LAB — INR BLD: 2.7

## 2023-08-18 PROCEDURE — 99211 OFF/OP EST MAY X REQ PHY/QHP: CPT | Performed by: HOME HEALTH

## 2023-08-18 PROCEDURE — 85610 PROTHROMBIN TIME: CPT | Performed by: HOME HEALTH

## 2023-08-18 NOTE — PROGRESS NOTES
MrAnna Harry is here for management of anticoagulation for AFib. PMH also significant for HTN, HLD, s/p ablation and DM. He presents today w/out complaint. Pt verified dosing regimen. Pt denies s/s bleeding/bruising/swelling/SOB. No BRBPR. No melena. No missed doses. No changes in OTC/herbal medications. Decreased metformin dose. No changes in diet. Denies EtOH and tobacco use. Prescriptions to be filled via [employer]. INR 2.7 is within the acceptable therapeutic range of 2-3. Recommend to continue dose of 7.5mg Tues/Sat, 5 mg all other days. Pt has warfarin 5 mg tabs. Will continue to monitor and check INR in 4 weeks. Dosing reminder card given with phone number, appointment date and time.    Return to clinic: 8/18/23  @ 7:00 am   Referring physician: Mikal Goodwin, APRN - [phone] Tracking Only    Intervention Detail:   Total # of Interventions Recommended: 0  Total # of Interventions Accepted: 0  Time Spent (min): 15

## 2023-09-05 NOTE — PROGRESS NOTES
problem. Does not bruise/bleed easily. Skin:  Negative for itching and rash. Musculoskeletal:  Negative for joint pain and joint swelling. Gastrointestinal:  Negative for hematemesis and hematochezia. Genitourinary:  Negative for dysuria and hematuria. Neurological:  Negative for dizziness and light-headedness. Psychiatric/Behavioral:  Negative for altered mental status. The patient is not nervous/anxious. Past Medical History:   Diagnosis Date    Allergic rhinitis     Atrial fibrillation (HCC)     Cancer (HCC)     Basal Cell cancer on right forehead    Chronic LBP     Diabetes mellitus ([address])     Non-insulin dependent    Erectile dysfunction 2012    History of blood transfusion     Hypercholesteremia     Hypertension     Kidney stone on left side 2012    Type II or unspecified type diabetes mellitus without mention of complication, not stated as uncontrolled     Wears glasses          Social History     Socioeconomic History    Marital status:      Spouse name: Not on file    Number of children: 4    Years of education: Not on file    Highest education level: Not on file   Occupational History    Not on file   Tobacco Use    Smoking status: Former     Packs/day: 0.80     Years: 35.00     Pack years: 28.00     Types: Cigarettes     Quit date: 12/3/2004     Years since quittin.7    Smokeless tobacco: Never   Vaping Use    Vaping Use: Never used   Substance and Sexual Activity    Alcohol use:  Yes     Alcohol/week: 1.0 standard drink     Types: 1 Cans of beer per week     Comment: 1 beer a month    Drug use: No    Sexual activity: Yes     Partners: Female   Other Topics Concern    Not on file   Social History Narrative    Not on file     Social Determinants of Health     Financial Resource Strain: Not on file   Food Insecurity: Not on file   Transportation Needs: Not on file   Physical Activity: Not on file   Stress: Not on file   Social Connections: Not on file   Intimate Partner

## 2023-09-06 ENCOUNTER — TELEPHONE (OUTPATIENT)
Dept: CARDIOLOGY CLINIC | Age: 73
End: 2023-09-06

## 2023-09-06 ENCOUNTER — OFFICE VISIT (OUTPATIENT)
Dept: CARDIOLOGY CLINIC | Age: 73
End: 2023-09-06
Payer: MEDICARE

## 2023-09-06 VITALS
DIASTOLIC BLOOD PRESSURE: 94 MMHG | HEART RATE: 56 BPM | SYSTOLIC BLOOD PRESSURE: 144 MMHG | OXYGEN SATURATION: 92 % | HEIGHT: 69 IN | BODY MASS INDEX: 27.78 KG/M2 | WEIGHT: 187.6 LBS

## 2023-09-06 DIAGNOSIS — I10 HTN (HYPERTENSION), BENIGN: Chronic | ICD-10-CM

## 2023-09-06 DIAGNOSIS — I48.0 PAF (PAROXYSMAL ATRIAL FIBRILLATION) (HCC): Primary | ICD-10-CM

## 2023-09-06 DIAGNOSIS — I44.0 FIRST DEGREE AV BLOCK: ICD-10-CM

## 2023-09-06 DIAGNOSIS — G47.30 SLEEP APNEA, UNSPECIFIED TYPE: ICD-10-CM

## 2023-09-06 DIAGNOSIS — R00.1 SINUS BRADYCARDIA: ICD-10-CM

## 2023-09-06 DIAGNOSIS — I48.3 TYPICAL ATRIAL FLUTTER (HCC): ICD-10-CM

## 2023-09-06 PROCEDURE — G8427 DOCREV CUR MEDS BY ELIG CLIN: HCPCS | Performed by: INTERNAL MEDICINE

## 2023-09-06 PROCEDURE — G8417 CALC BMI ABV UP PARAM F/U: HCPCS | Performed by: INTERNAL MEDICINE

## 2023-09-06 PROCEDURE — 93000 ELECTROCARDIOGRAM COMPLETE: CPT | Performed by: INTERNAL MEDICINE

## 2023-09-06 PROCEDURE — 99214 OFFICE O/P EST MOD 30 MIN: CPT | Performed by: INTERNAL MEDICINE

## 2023-09-06 PROCEDURE — 3077F SYST BP >= 140 MM HG: CPT | Performed by: INTERNAL MEDICINE

## 2023-09-06 PROCEDURE — 3080F DIAST BP >= 90 MM HG: CPT | Performed by: INTERNAL MEDICINE

## 2023-09-06 RX ORDER — LOSARTAN POTASSIUM AND HYDROCHLOROTHIAZIDE 25; 100 MG/1; MG/1
1 TABLET ORAL DAILY
Qty: 90 TABLET | Refills: 2 | Status: SHIPPED | OUTPATIENT
Start: 2023-09-06

## 2023-09-06 RX ORDER — CARBIDOPA AND LEVODOPA 25; 100 MG/1; MG/1
1 TABLET, ORALLY DISINTEGRATING ORAL 3 TIMES DAILY
COMMUNITY

## 2023-09-06 ASSESSMENT — ENCOUNTER SYMPTOMS
STRIDOR: 0
WHEEZING: 0
HEMATEMESIS: 0
LEFT EYE: 0
HEMATOCHEZIA: 0
SHORTNESS OF BREATH: 0
RIGHT EYE: 0

## 2023-09-06 NOTE — PATIENT INSTRUCTIONS
Plan:     Cardiac event monitor for 4 weeks. - We will discuss decreasing Flecainide  based on results. Echocardiogram to assess heart structure and function. Referral to sleep medicine to discuss sleep apnea treatment. Continue taking other current cardiac medications as prescribed. Follow up with me in 2 - 3 months. Your provider has ordered testing for further evaluation. An order/prescription has been included in your paper work. To schedule outpatient testing, contact Central Scheduling by calling [address] [place] [address] ([phone]).

## 2023-09-06 NOTE — TELEPHONE ENCOUNTER
Monitor placed by [employer]  Monitor company [employer]  Length of monitor 30 DAY  Monitor ordered by Gerhard Mares  Serial number [id number]  Patch ID [id number]  Activation successful prior to pt leaving office?  yes

## 2023-09-22 ENCOUNTER — ANTI-COAG VISIT (OUTPATIENT)
Dept: PHARMACY | Age: 73
End: 2023-09-22
Payer: MEDICARE

## 2023-09-22 DIAGNOSIS — I48.0 PAF (PAROXYSMAL ATRIAL FIBRILLATION) (HCC): Primary | ICD-10-CM

## 2023-09-22 LAB — INR BLD: 2.7

## 2023-09-22 PROCEDURE — 99211 OFF/OP EST MAY X REQ PHY/QHP: CPT | Performed by: HOME HEALTH

## 2023-09-22 PROCEDURE — 85610 PROTHROMBIN TIME: CPT | Performed by: HOME HEALTH

## 2023-09-22 NOTE — PROGRESS NOTES
Mr. Jennifer Eagle is here for management of anticoagulation for AFib. PMH also significant for HTN, HLD, s/p ablation and DM. He presents today w/out complaint. Pt verified dosing regimen. Pt denies s/s bleeding/bruising/swelling/SOB. No BRBPR. No melena. No missed doses. No changes in OTC/herbal medications. Decreased metformin dose. No changes in diet. Denies EtOH and tobacco use. Prescriptions to be filled via [employer]. INR 2.7 is within the acceptable therapeutic range of 2-3. Recommend to continue dose of 7.5mg Tues/Sat, 5 mg all other days. Pt has warfarin 5 mg tabs. Will continue to monitor and check INR in 4 weeks. Dosing reminder card given with phone number, appointment date and time.    Return to clinic: 8/18/23  @ 7:00 am   Referring physician: Domi Portillo, APRN - [id number] Tracking Only    Intervention Detail:   Total # of Interventions Recommended: 0  Total # of Interventions Accepted: 0  Time Spent (min): 15

## 2023-10-05 ENCOUNTER — OFFICE VISIT (OUTPATIENT)
Dept: PULMONOLOGY | Age: 73
End: 2023-10-05
Payer: MEDICARE

## 2023-10-05 VITALS
WEIGHT: 186 LBS | SYSTOLIC BLOOD PRESSURE: 136 MMHG | OXYGEN SATURATION: 95 % | BODY MASS INDEX: 27.55 KG/M2 | TEMPERATURE: 98.9 F | RESPIRATION RATE: 16 BRPM | DIASTOLIC BLOOD PRESSURE: 72 MMHG | HEIGHT: 69 IN | HEART RATE: 54 BPM

## 2023-10-05 DIAGNOSIS — E66.3 OVERWEIGHT (BMI 25.0-29.9): ICD-10-CM

## 2023-10-05 DIAGNOSIS — I48.0 PAF (PAROXYSMAL ATRIAL FIBRILLATION) (HCC): ICD-10-CM

## 2023-10-05 DIAGNOSIS — G47.33 OSA (OBSTRUCTIVE SLEEP APNEA): Primary | ICD-10-CM

## 2023-10-05 DIAGNOSIS — I10 HTN (HYPERTENSION), BENIGN: Chronic | ICD-10-CM

## 2023-10-05 PROBLEM — G20.A1 PARKINSON'S DISEASE: Status: ACTIVE | Noted: 2022-02-22

## 2023-10-05 PROBLEM — H40.9 GLAUCOMA: Status: ACTIVE | Noted: 2022-01-10

## 2023-10-05 PROBLEM — M51.379 DDD (DEGENERATIVE DISC DISEASE), LUMBOSACRAL: Status: ACTIVE | Noted: 2017-09-12

## 2023-10-05 PROBLEM — M51.37 DDD (DEGENERATIVE DISC DISEASE), LUMBOSACRAL: Status: ACTIVE | Noted: 2017-09-12

## 2023-10-05 PROCEDURE — 3075F SYST BP GE 130 - 139MM HG: CPT | Performed by: NURSE PRACTITIONER

## 2023-10-05 PROCEDURE — 99203 OFFICE O/P NEW LOW 30 MIN: CPT | Performed by: NURSE PRACTITIONER

## 2023-10-05 PROCEDURE — 3017F COLORECTAL CA SCREEN DOC REV: CPT | Performed by: NURSE PRACTITIONER

## 2023-10-05 PROCEDURE — G8484 FLU IMMUNIZE NO ADMIN: HCPCS | Performed by: NURSE PRACTITIONER

## 2023-10-05 PROCEDURE — G8427 DOCREV CUR MEDS BY ELIG CLIN: HCPCS | Performed by: NURSE PRACTITIONER

## 2023-10-05 PROCEDURE — 1123F ACP DISCUSS/DSCN MKR DOCD: CPT | Performed by: NURSE PRACTITIONER

## 2023-10-05 PROCEDURE — G8417 CALC BMI ABV UP PARAM F/U: HCPCS | Performed by: NURSE PRACTITIONER

## 2023-10-05 PROCEDURE — 3078F DIAST BP <80 MM HG: CPT | Performed by: NURSE PRACTITIONER

## 2023-10-05 PROCEDURE — 1036F TOBACCO NON-USER: CPT | Performed by: NURSE PRACTITIONER

## 2023-10-05 RX ORDER — CALCIUM CITRATE/VITAMIN D3 200MG-6.25
TABLET ORAL
COMMUNITY
Start: 2023-09-12

## 2023-10-05 RX ORDER — LOSARTAN POTASSIUM 100 MG/1
TABLET ORAL
COMMUNITY
Start: 2023-10-04

## 2023-10-05 RX ORDER — CARBIDOPA/LEVODOPA 25MG-250MG
TABLET ORAL
COMMUNITY
Start: 2023-10-02

## 2023-10-05 RX ORDER — GLUCOSAM/CHON-MSM1/C/MANG/BOSW 500-416.6
TABLET ORAL
COMMUNITY
Start: 2023-08-17

## 2023-10-05 RX ORDER — AMLODIPINE BESYLATE 5 MG/1
TABLET ORAL
COMMUNITY
Start: 2023-10-02

## 2023-10-05 RX ORDER — BLOOD-GLUCOSE CONTROL, LOW
EACH MISCELLANEOUS
COMMUNITY
Start: 2023-10-02

## 2023-10-05 ASSESSMENT — ENCOUNTER SYMPTOMS
WHEEZING: 0
SHORTNESS OF BREATH: 0
CHEST TIGHTNESS: 0
EYE PAIN: 0
ABDOMINAL PAIN: 0
COUGH: 0
SORE THROAT: 0
RHINORRHEA: 0

## 2023-10-05 ASSESSMENT — SLEEP AND FATIGUE QUESTIONNAIRES
HOW LIKELY ARE YOU TO NOD OFF OR FALL ASLEEP WHILE WATCHING TV: 0
ESS TOTAL SCORE: 2
HOW LIKELY ARE YOU TO NOD OFF OR FALL ASLEEP WHILE SITTING AND TALKING TO SOMEONE: 0
HOW LIKELY ARE YOU TO NOD OFF OR FALL ASLEEP IN A CAR, WHILE STOPPED FOR A FEW MINUTES IN TRAFFIC: 0
NECK CIRCUMFERENCE (INCHES): 18
HOW LIKELY ARE YOU TO NOD OFF OR FALL ASLEEP WHEN YOU ARE A PASSENGER IN A CAR FOR AN HOUR WITHOUT A BREAK: 0
HOW LIKELY ARE YOU TO NOD OFF OR FALL ASLEEP WHILE LYING DOWN TO REST IN THE AFTERNOON WHEN CIRCUMSTANCES PERMIT: 1
HOW LIKELY ARE YOU TO NOD OFF OR FALL ASLEEP WHILE SITTING QUIETLY AFTER LUNCH WITHOUT ALCOHOL: 0
HOW LIKELY ARE YOU TO NOD OFF OR FALL ASLEEP WHILE SITTING INACTIVE IN A PUBLIC PLACE: 0
HOW LIKELY ARE YOU TO NOD OFF OR FALL ASLEEP WHILE SITTING AND READING: 1

## 2023-10-05 NOTE — PROGRESS NOTES
MA Communication: The following orders are received by verbal communication from Chucho Pardo CNP    Orders include:  Home Sleep Study   Sleep center will call to sidra. Yana Leon will call with results.
sleepiness. 10-15: You may be excessively sleepy depending on the situation. You may want to consider seeking medical attention. 16-24: You are excessively sleepy and should consider seeking medical attention    Exercise/diet: [employer]  2-3 days a week, weights and recumbent bike. Current Outpatient Medications   Medication Sig Dispense Refill    amLODIPine (NORVASC) 5 MG tablet       metFORMIN (GLUCOPHAGE) 500 MG tablet       losartan-hydroCHLOROthiazide (HYZAAR) 100-25 MG per tablet Take 1 tablet by mouth daily 90 tablet 2    flecainide (TAMBOCOR) 100 MG tablet TAKE 1 TABLET EVERY DAY. 90 tablet 0    warfarin (COUMADIN) 5 MG tablet Take 1 to 1.5 tablet by mouth daily Or as directed by coumadin clinic. Current dose 7.5 mg on 2days/week, 5 mg all other days 105 tablet 3    metoprolol succinate (TOPROL XL) 50 MG extended release tablet Take 1 tablet by mouth daily 30 tablet 0    HYDROcodone-acetaminophen (NORCO) 7.5-325 MG per tablet TAKE ONE TABLET BY MOUTH EVERY 4-6 HOURS AS NEEDED FOR PAIN. MAXIMUM 5 per day. . 150 tablet 0    fenofibrate 160 MG tablet TAKE 1 TABLET ONE TIME DAILY 90 tablet 3    atorvastatin (LIPITOR) 40 MG tablet TAKE 1 TABLET BY MOUTH ONE TIME A DAY 90 tablet 3    Multiple Vitamins-Minerals (THERAPEUTIC MULTIVITAMIN-MINERALS) tablet Take 1 tablet by mouth daily      vitamin B-12 (CYANOCOBALAMIN) 1000 MCG tablet Take 1,200 mcg by mouth daily. Blood Glucose Calibration (TRUE METRIX LEVEL 1) Low SOLN       TRUE METRIX BLOOD GLUCOSE TEST strip       TRUEplus Lancets 33G MISC       losartan (COZAAR) 100 MG tablet  (Patient not taking: Reported on 10/5/2023)      carbidopa-levodopa (SINEMET)  MG per tablet       Cholecalciferol (VITAMIN D) 2000 UNITS CAPS capsule Take  by mouth daily. (Patient not taking: Reported on 9/6/2023)       No current facility-administered medications for this visit.         No Known Allergies     Past Medical History:   Diagnosis Date    Allergic rhinitis

## 2023-10-05 NOTE — PATIENT INSTRUCTIONS
Steffi Stanton will be scheduled for polysomnography in order to evaluate for the presence and severity of obstructive sleep apnea. He was given a discussion of the pathophysiology, evaluation and treatment of apnea. Thyroid function tests are recommended if not done recently. May consider Inspire if not able to tolerate PAP therapy again. Advised to avoid driving when too sleepy to function safely. Discussed the risks of untreated apnea such as accidents, cognitive impairment, mood impairment, high blood pressure, various cardiac diseases and stroke. Regarding overweight, recommend to try a formal program and increase physical activity by adding a 30 minute walk to daily routine. Weight loss was encouraged as a long term approach to treatment of ALCIDES. Explained the correlation between obesity and apnea and the causative role it can play. Dr. Melissa Zapata will call you with the results     Blood pressure and heart rate today is controlled, continue current medication regimen per  cardiology. Remember to bring a list of pulmonary medications and any CPAP or BiPAP machines to your next appointment with the office. Please keep all of your future appointments scheduled by [facility] office. Out of respect for other patients and providers, you may be asked to reschedule your appointment if you arrive later than your scheduled appointment time. Appointments cancelled less than 24hrs in advance will be considered a no show. Patients with three missed appointments within 1 year or four missed appointments within 2 years can be dismissed from the practice. Please be aware that our physicians are required to work in the Intensive Care Unit at [facility].  Your appointment may need to be rescheduled if they are designated to work during your appointment time. You may receive a survey regarding the care you received during your visit. Your input is valuable to us.   We

## 2023-10-10 PROCEDURE — 93228 REMOTE 30 DAY ECG REV/REPORT: CPT | Performed by: INTERNAL MEDICINE

## 2023-10-17 ENCOUNTER — HOSPITAL ENCOUNTER (OUTPATIENT)
Dept: SLEEP CENTER | Age: 73
Discharge: HOME | End: 2023-10-19
Payer: MEDICARE

## 2023-10-17 DIAGNOSIS — G47.33 OSA (OBSTRUCTIVE SLEEP APNEA): ICD-10-CM

## 2023-10-17 PROCEDURE — 95806 SLEEP STUDY UNATT&RESP EFFT: CPT

## 2023-10-17 PROCEDURE — 95806 SLEEP STUDY UNATT&RESP EFFT: CPT | Performed by: INTERNAL MEDICINE

## 2023-10-19 ENCOUNTER — TELEPHONE (OUTPATIENT)
Dept: PULMONOLOGY | Age: 73
End: 2023-10-19

## 2023-10-19 PROBLEM — G47.33 OSA (OBSTRUCTIVE SLEEP APNEA): Status: ACTIVE | Noted: 2023-10-19

## 2023-10-19 NOTE — TELEPHONE ENCOUNTER
Called and left a message for the patient  The patient is currently using a full facemask and having issues with this mask despite being on a BiPAP therapy  I told him to call me back so we can kind to consider options  The other option is to change the mask  The other option is to consider even inspire

## 2023-10-25 ENCOUNTER — ANTI-COAG VISIT (OUTPATIENT)
Dept: PHARMACY | Age: 73
End: 2023-10-25
Payer: MEDICARE

## 2023-10-25 DIAGNOSIS — I48.0 PAF (PAROXYSMAL ATRIAL FIBRILLATION) (HCC): Primary | ICD-10-CM

## 2023-10-25 DIAGNOSIS — I48.0 PAF (PAROXYSMAL ATRIAL FIBRILLATION) (HCC): ICD-10-CM

## 2023-10-25 LAB — INR BLD: 3.4

## 2023-10-25 PROCEDURE — 85610 PROTHROMBIN TIME: CPT | Performed by: FAMILY MEDICINE

## 2023-10-25 PROCEDURE — 99211 OFF/OP EST MAY X REQ PHY/QHP: CPT | Performed by: FAMILY MEDICINE

## 2023-10-25 NOTE — PROGRESS NOTES
MrAnna Fitch is here for management of anticoagulation for AFib. PMH also significant for HTN, HLD, s/p ablation and DM. He presents today w/out complaint. Pt verified dosing regimen. Pt denies s/s bleeding/bruising/swelling/SOB. No BRBPR. No melena. No missed doses. No changes in OTC/herbal medications. Decreased metformin dose. No changes in diet. Denies EtOH and tobacco use. Prescriptions to be filled via [employer]. No changes per pt     INR 3.4 is above the acceptable therapeutic range of 2-3. Recommend to skip dose today, then continue dose of 7.5mg Tues/Sat, 5 mg all other days. Pt has warfarin 5 mg tabs. Will continue to monitor and check INR in 4 weeks. Dosing reminder card given with phone number, appointment date and time.    Return to clinic: 11/20 @ 7 am   Referring physician: Betito Dixon, APRN - [id number] [employer] Tracking Only    Intervention Detail:   Total # of Interventions Recommended: 0  Total # of Interventions Accepted: 0  Time Spent (min): [address] [place] [address],[address], PharmD 10/25/2023 7:15 AM

## 2023-10-31 ENCOUNTER — TELEPHONE (OUTPATIENT)
Dept: FAMILY MEDICINE CLINIC | Age: 73
End: 2023-10-31

## 2023-10-31 NOTE — TELEPHONE ENCOUNTER
----- Message from George Fried sent at 10/31/2023 12:21 PM EDT -----  Subject: Message to Provider    QUESTIONS  Information for Provider? [place] with [facility] is requesting that   Marta Grigsby MD open his panel so that they are able to schedule   new patients with him. To do so, please call the number provided between 7   am and 8 pm central standard time. Patient is scheduled for a new patient   appointment. If panel is not open it takes a much longer time to process. ---------------------------------------------------------------------------  --------------  [employer]  [phone]; Do not leave any message, patient will call back for answer  ---------------------------------------------------------------------------  --------------  SCRIPT ANSWERS  Relationship to Patient? Covered Entity  Covered Entity Type? Health Insurance? Representative Name?  Brittany Root 0 = understands/communicates without difficulty

## 2023-11-01 ENCOUNTER — TELEPHONE (OUTPATIENT)
Dept: CARDIOLOGY CLINIC | Age: 73
End: 2023-11-01

## 2023-11-01 NOTE — TELEPHONE ENCOUNTER
I attempted to call the patient to relay cardiac event monitor results. Monitor overall showed sinus rhythm with low burden (amount) of PACs/PVCs. Please tell patient to follow up as scheduled.       Front Staff-Please see [facility] note 9/2023. Patient was supposed to follow up in 2-3 months and that appointment has not be scheduled. Please call to schedule follow up. Thanks.

## 2023-11-20 ENCOUNTER — ANTI-COAG VISIT (OUTPATIENT)
Dept: PHARMACY | Age: 73
End: 2023-11-20
Payer: COMMERCIAL

## 2023-11-20 DIAGNOSIS — I48.0 PAF (PAROXYSMAL ATRIAL FIBRILLATION) (HCC): Primary | ICD-10-CM

## 2023-11-20 LAB — INTERNATIONAL NORMALIZATION RATIO, POC: 3.4

## 2023-11-20 PROCEDURE — 85610 PROTHROMBIN TIME: CPT | Performed by: INTERNAL MEDICINE

## 2023-11-20 PROCEDURE — 99211 OFF/OP EST MAY X REQ PHY/QHP: CPT | Performed by: INTERNAL MEDICINE

## 2023-11-20 NOTE — PROGRESS NOTES
Mr. Claudio Thomas is here for management of anticoagulation for AFib. PMH also significant for HTN, HLD, s/p ablation and DM. He presents today w/out complaint. Pt verified dosing regimen. Pt denies s/s bleeding/bruising/swelling/SOB. No BRBPR. No melena. No missed doses. No changes in OTC/herbal medications. Decreased metformin dose. No changes in diet. Denies EtOH and tobacco use. Prescriptions to be filled via [employer]. No changes per pt     INR 3.4 is above the acceptable therapeutic range of 2-3. Recommend to skip dose today, then reduce dose to 7.5mg Sat, 5 mg all other days. Pt has warfarin 5 mg tabs. Will continue to monitor and check INR in 4 weeks. Dosing reminder card given with phone number, appointment date and time.    Return to clinic: 12/15 @ 7 am   Referring physician: River Love, APRN - [id number] Tracking Only    Intervention Detail: Adherence Monitorin and Dose Adjustment: 1, reason: Therapy De-escalation  Total # of Interventions Recommended: 2  Total # of Interventions Accepted: 2  Time Spent (min): 15

## 2023-12-11 RX ORDER — FLECAINIDE ACETATE 100 MG/1
TABLET ORAL
Qty: 90 TABLET | Refills: 3 | Status: SHIPPED | OUTPATIENT
Start: 2023-12-11

## 2023-12-15 ENCOUNTER — ANTI-COAG VISIT (OUTPATIENT)
Dept: PHARMACY | Age: 73
End: 2023-12-15
Payer: COMMERCIAL

## 2023-12-15 DIAGNOSIS — I48.0 PAF (PAROXYSMAL ATRIAL FIBRILLATION) (HCC): Primary | ICD-10-CM

## 2023-12-15 LAB — INR BLD: 1.8

## 2023-12-15 PROCEDURE — 85610 PROTHROMBIN TIME: CPT | Performed by: HOME HEALTH

## 2023-12-15 PROCEDURE — 99211 OFF/OP EST MAY X REQ PHY/QHP: CPT | Performed by: HOME HEALTH

## 2023-12-15 NOTE — PROGRESS NOTES
Mr. Claudio Thomas is here for management of anticoagulation for AFib. PMH also significant for HTN, HLD, s/p ablation and DM. He presents today w/out complaint. Pt verified dosing regimen. Pt denies s/s bleeding/bruising/swelling/SOB. No missed doses. No changes in OTC/herbal medications. No changes in diet. Denies EtOH and tobacco use. Prescriptions to be filled via [employer]. No changes per pt     INR 1.8 is at the acceptable therapeutic range of 2-3. Recommend to continue 7.5mg Sat, 5 mg all other days. Pt has warfarin 5 mg tabs. Will continue to monitor and check INR in 4 weeks. Dosing reminder card given with phone number, appointment date and time.    Return to clinic: 12/15 @ 7 am   Referring physician: KESHIA Coronel CNP

## 2023-12-26 RX ORDER — WARFARIN SODIUM 5 MG/1
TABLET ORAL
Qty: 105 TABLET | Refills: 3 | Status: SHIPPED | OUTPATIENT
Start: 2023-12-26

## 2023-12-26 NOTE — TELEPHONE ENCOUNTER
Demetris Poisson OV:9/6/2023 [facility]   Future OV:1/15/2024 [facility]   INR 12/15/2023  ANTI COAG 12/15/2023    PHYLICIA AND [facility] CHRISTIANOT

## 2024-01-24 ENCOUNTER — TELEPHONE (OUTPATIENT)
Dept: CARDIOLOGY CLINIC | Age: 74
End: 2024-01-24

## 2024-01-24 NOTE — TELEPHONE ENCOUNTER
Pt stated when he had his cataract surgery on 1/18/24 his HR was fluctuating mostly high. Pt doesn't complain of any symptoms when it would get high. Pt stated he has been checking his pulse ox at home and sometimes the reading will be 112bpm then go down to 60 but he stated it is not staying consistent. Pt has been rescheduled for his echo for 2/1/24 and rescheduled appt with [facility]xa for 2/12/24. Pt can be reached at [phone].

## 2024-01-26 NOTE — TELEPHONE ENCOUNTER
Jim Edward MD  [facility] Ep11 hours ago (9:05 PM)       Please suggest to him obtaining the Kardia heart monitor device to record episodes when he has symptoms or heart rate is variable. This way we have documentation of what is going on

## 2024-01-26 NOTE — TELEPHONE ENCOUNTER
LVM for patient to return call-when he returns call please see [facility] message.     Can obtain [employer] on [employer] or at [employer]

## 2024-02-01 ENCOUNTER — PROCEDURE VISIT (OUTPATIENT)
Dept: CARDIOLOGY CLINIC | Age: 74
End: 2024-02-01

## 2024-02-01 DIAGNOSIS — R00.1 SINUS BRADYCARDIA: ICD-10-CM

## 2024-02-01 DIAGNOSIS — I48.3 TYPICAL ATRIAL FLUTTER (HCC): ICD-10-CM

## 2024-02-01 DIAGNOSIS — I44.0 FIRST DEGREE AV BLOCK: ICD-10-CM

## 2024-02-01 DIAGNOSIS — I48.0 PAF (PAROXYSMAL ATRIAL FIBRILLATION) (HCC): ICD-10-CM

## 2024-02-02 ENCOUNTER — TELEPHONE (OUTPATIENT)
Dept: CARDIOLOGY CLINIC | Age: 74
End: 2024-02-02

## 2024-02-02 NOTE — TELEPHONE ENCOUNTER
----- Message from Jim Edward MD sent at 2/2/2024 12:14 PM EST -----  Regarding: echo result  Please let patient know that echo was fine. No significant abnormalities noted. Thank you    ----- Message -----  From: Beth Carver Incoming Cardiovascular Orders From [facility]  Sent: 2/1/2024   5:21 PM EST  To: Jim Edward MD

## 2024-02-08 NOTE — PROGRESS NOTES
Assessment:     1. Atrial fibrillation: patient has paroxysmal atrial fibrillation.    Associated symptoms: Palpitations     History of cardioversion: Unknown  History of AF ablation: Had atrial fibrillation ablation in the past (February 2019)  History of heart surgery/procedure: yes, ablation as above  History of other cardiac arrhythmias: yes, atrial flutter documented in 2019 Holter    Current use of anti-arrhythmic drugs: flecainide  Previous other use of anti-arrhythmic drugs: flecainide    Overall LV function: Normal left ventricular systolic function  Size of left atrium: Normal LA size  Significant cardiac valvular disease: No significant valve disease    Family history of atrial fibrillation: Yes, brother    Alcohol consumption: Minimal/rare alcohol consumption  Caffeine consumption: Mild caffeine intake  Smoking status: former smoker, quit many years ago  Obstructive sleep apnea: Not on CPAP/BiPAP therapy  Exercise status: Regular moderate exercise    I have had discussions with the patient about issues related to atrial fibrillation (including etiology, disease progression patterns, stroke risk and rate control issues). Patient had his questions answered to his satisfaction.      Patient has a [id number] score of 6 [Age over 75 (2 points), Diabetes Mellitus (1 point), Hypertension (1 point), and History of stroke/TIA (2 points)]  Longterm anticoagulation is: recommended  Current anticoagulation: Warfarin  Bleeding issues reported: no  Renal function: Normal renal function  Thyroid function:  previously with normal findings    Patient with prior diagnosis of atrial fibrillation and underwent wide area circumferential ablation (WACA) for pulmonary vein isolation in 2019.  He subsequently had documented atrial flutter on a Holter monitor.  He has been on flecainide 100 mg twice daily and beta-blocker therapy.  Has not had close cardiology follow-up for the past few years.  I am concerned because the

## 2024-02-12 ENCOUNTER — OFFICE VISIT (OUTPATIENT)
Dept: CARDIOLOGY CLINIC | Age: 74
End: 2024-02-12
Payer: MEDICARE

## 2024-02-12 VITALS
HEART RATE: 53 BPM | DIASTOLIC BLOOD PRESSURE: 78 MMHG | WEIGHT: 189.6 LBS | HEIGHT: 69 IN | SYSTOLIC BLOOD PRESSURE: 120 MMHG | OXYGEN SATURATION: 97 % | BODY MASS INDEX: 28.08 KG/M2

## 2024-02-12 DIAGNOSIS — I48.0 PAF (PAROXYSMAL ATRIAL FIBRILLATION) (HCC): Primary | ICD-10-CM

## 2024-02-12 DIAGNOSIS — G47.33 OSA (OBSTRUCTIVE SLEEP APNEA): ICD-10-CM

## 2024-02-12 DIAGNOSIS — R00.1 SINUS BRADYCARDIA: ICD-10-CM

## 2024-02-12 DIAGNOSIS — I44.0 FIRST DEGREE AV BLOCK: ICD-10-CM

## 2024-02-12 DIAGNOSIS — Z79.899 ENCOUNTER FOR MONITORING FLECAINIDE THERAPY: ICD-10-CM

## 2024-02-12 DIAGNOSIS — Z51.81 ENCOUNTER FOR MONITORING FLECAINIDE THERAPY: ICD-10-CM

## 2024-02-12 DIAGNOSIS — I48.3 TYPICAL ATRIAL FLUTTER (HCC): ICD-10-CM

## 2024-02-12 PROCEDURE — G8417 CALC BMI ABV UP PARAM F/U: HCPCS | Performed by: INTERNAL MEDICINE

## 2024-02-12 PROCEDURE — 3074F SYST BP LT 130 MM HG: CPT | Performed by: INTERNAL MEDICINE

## 2024-02-12 PROCEDURE — 3078F DIAST BP <80 MM HG: CPT | Performed by: INTERNAL MEDICINE

## 2024-02-12 PROCEDURE — 99214 OFFICE O/P EST MOD 30 MIN: CPT | Performed by: INTERNAL MEDICINE

## 2024-02-12 PROCEDURE — G8427 DOCREV CUR MEDS BY ELIG CLIN: HCPCS | Performed by: INTERNAL MEDICINE

## 2024-02-12 PROCEDURE — 1123F ACP DISCUSS/DSCN MKR DOCD: CPT | Performed by: INTERNAL MEDICINE

## 2024-02-12 PROCEDURE — G8484 FLU IMMUNIZE NO ADMIN: HCPCS | Performed by: INTERNAL MEDICINE

## 2024-02-12 PROCEDURE — 1036F TOBACCO NON-USER: CPT | Performed by: INTERNAL MEDICINE

## 2024-02-12 PROCEDURE — 93000 ELECTROCARDIOGRAM COMPLETE: CPT | Performed by: INTERNAL MEDICINE

## 2024-02-12 RX ORDER — FLECAINIDE ACETATE 100 MG/1
50 TABLET ORAL 2 TIMES DAILY
Qty: 90 TABLET | Refills: 3 | Status: SHIPPED
Start: 2024-02-12

## 2024-02-12 NOTE — PATIENT INSTRUCTIONS
Plan:     Decrease Flecainide to 50 mg twice daily.   Labs: CBC and CMP  Continue taking other current cardiac medications as prescribed.   Follow up with me in 4 months.

## 2024-02-13 ENCOUNTER — HOSPITAL ENCOUNTER (OUTPATIENT)
Age: 74
Discharge: HOME | End: 2024-02-13
Payer: MEDICARE

## 2024-02-13 DIAGNOSIS — I48.0 PAF (PAROXYSMAL ATRIAL FIBRILLATION) (HCC): ICD-10-CM

## 2024-02-13 LAB
ALBUMIN SERPL-MCNC: 4.2 G/DL (ref 3.4–5)
ALBUMIN/GLOB SERPL: 1.6 {RATIO} (ref 1.1–2.2)
ALP SERPL-CCNC: 78 U/L (ref 40–129)
ALT SERPL-CCNC: 32 U/L (ref 10–40)
ANION GAP SERPL CALCULATED.3IONS-SCNC: 11 MMOL/L (ref 3–16)
AST SERPL-CCNC: 27 U/L (ref 15–37)
BASOPHILS # BLD: 0 K/UL (ref 0–0.2)
BASOPHILS NFR BLD: 0.4 %
BILIRUB SERPL-MCNC: 0.6 MG/DL (ref 0–1)
BUN SERPL-MCNC: 20 MG/DL (ref 7–20)
CALCIUM SERPL-MCNC: 9.4 MG/DL (ref 8.3–10.6)
CHLORIDE SERPL-SCNC: 98 MMOL/L (ref 99–110)
CO2 SERPL-SCNC: 30 MMOL/L (ref 21–32)
CREAT SERPL-MCNC: 1 MG/DL (ref 0.8–1.3)
DEPRECATED RDW RBC AUTO: 13.4 % (ref 12.4–15.4)
EOSINOPHIL # BLD: 0.1 K/UL (ref 0–0.6)
EOSINOPHIL NFR BLD: 1.5 %
GFR SERPLBLD CREATININE-BSD FMLA CKD-EPI: >60 ML/MIN/{1.73_M2}
GLUCOSE SERPL-MCNC: 132 MG/DL (ref 70–99)
HCT VFR BLD AUTO: 42.9 % (ref 40.5–52.5)
HGB BLD-MCNC: 14.3 G/DL (ref 13.5–17.5)
LYMPHOCYTES # BLD: 1.7 K/UL (ref 1–5.1)
LYMPHOCYTES NFR BLD: 22.9 %
MCH RBC QN AUTO: 30.7 PG (ref 26–34)
MCHC RBC AUTO-ENTMCNC: 33.3 G/DL (ref 31–36)
MCV RBC AUTO: 92 FL (ref 80–100)
MONOCYTES # BLD: 0.6 K/UL (ref 0–1.3)
MONOCYTES NFR BLD: 8.5 %
NEUTROPHILS # BLD: 5 K/UL (ref 1.7–7.7)
NEUTROPHILS NFR BLD: 66.7 %
PLATELET # BLD AUTO: 234 K/UL (ref 135–450)
PLATELET BLD QL SMEAR: ADEQUATE
PMV BLD AUTO: 9.6 FL (ref 5–10.5)
POTASSIUM SERPL-SCNC: 3.9 MMOL/L (ref 3.5–5.1)
PROT SERPL-MCNC: 6.8 G/DL (ref 6.4–8.2)
RBC # BLD AUTO: 4.66 M/UL (ref 4.2–5.9)
SLIDE REVIEW: NORMAL
SODIUM SERPL-SCNC: 139 MMOL/L (ref 136–145)
WBC # BLD AUTO: 7.4 K/UL (ref 4–11)

## 2024-02-13 PROCEDURE — 36415 COLL VENOUS BLD VENIPUNCTURE: CPT

## 2024-02-13 PROCEDURE — 85025 COMPLETE CBC W/AUTO DIFF WBC: CPT

## 2024-02-13 PROCEDURE — 80053 COMPREHEN METABOLIC PANEL: CPT

## 2024-03-27 ENCOUNTER — TELEPHONE (OUTPATIENT)
Dept: CARDIOLOGY CLINIC | Age: 74
End: 2024-03-27

## 2024-03-27 NOTE — TELEPHONE ENCOUNTER
I spoke with the patient and he states that now his heart rate has decreased to 86. When his heart rate was elevated he was out shopping when he suddenly felt dizziness and poorly. He had taken his medication today. He had not eaten breakfast. He feels well now. I instructed the patient ot monitor for recurrence and call our office. He V/U.

## 2024-03-27 NOTE — TELEPHONE ENCOUNTER
EP/Palpitations/Fast Heart Rate:      1) When did your symptoms start? TODAY    Are your symptoms constant or do they come and go? CONSTANT.      2) Can you tell me what your heart rate is?           3) Have you taken any medication for this today? YES   If so what? FLECAINIDE AND ALSO TAKES COUMADIN IN THE EVENING    4) Any recent medication changes?       [facility] DID DECREASE FLECAINIDE TO       50 MG TWICE DAILY.      ON 2.12.24 OV.    PER PT ALSO HAVING DIZZINESS.  NO SOB  NO CP  PER BP TODAY /90'S.

## 2024-04-17 RX ORDER — FLECAINIDE ACETATE 100 MG/1
50 TABLET ORAL 2 TIMES DAILY
Qty: 90 TABLET | Refills: 3 | Status: SHIPPED | OUTPATIENT
Start: 2024-04-17

## 2024-04-17 RX ORDER — FLECAINIDE ACETATE 100 MG/1
50 TABLET ORAL 2 TIMES DAILY
Qty: 90 TABLET | Refills: 3 | Status: CANCELLED | OUTPATIENT
Start: 2024-04-17

## 2024-05-20 ENCOUNTER — ANTI-COAG VISIT (OUTPATIENT)
Dept: PHARMACY | Age: 74
End: 2024-05-20
Payer: MEDICARE

## 2024-05-20 DIAGNOSIS — I48.0 PAF (PAROXYSMAL ATRIAL FIBRILLATION) (HCC): Primary | ICD-10-CM

## 2024-05-20 LAB — INTERNATIONAL NORMALIZATION RATIO, POC: 4.4

## 2024-05-20 PROCEDURE — 85610 PROTHROMBIN TIME: CPT

## 2024-05-20 PROCEDURE — 99211 OFF/OP EST MAY X REQ PHY/QHP: CPT

## 2024-05-20 NOTE — PROGRESS NOTES
Mr. Beaulieu is here for management of anticoagulation for AFib.   PMH also significant for HTN, HLD, s/p ablation and DM.    He presents today w/out complaint.  Pt verified dosing regimen.  Pt denies s/s bleeding/bruising/swelling/SOB.  No missed doses.  No changes in OTC/herbal medications.  No changes in diet.  Denies EtOH and tobacco use.  Prescriptions to be filled via [employer].    Patient has not been seen since Dec 2023  Confirmed that patient has been taking the same warfarin dosing since last visit  No significant medication changes  INR is elevated today- will hold and decrease dosing  Small bruising on arms, otherwise no other S&S of bleeding.    INR 4.4 is above acceptable therapeutic range of 2-3.  Recommend to hold today dose and decreasing dose to 5 mg daily   Pt has warfarin 5 mg tabs.  Will continue to monitor and check INR in 2 weeks.  Dosing reminder card given with phone number, appointment date and time.   Return to clinic: 6/3 @ 7 am   Referring physician: KESHIA Bradley - CHELE Plata, PharmD  2024 at 7:49 AM    For Pharmacy Admin Tracking Only    Intervention Detail: Adherence Monitorin and Dose Adjustment: 1, reason: Therapy De-escalation  Total # of Interventions Recommended: 1  Total # of Interventions Accepted: 1  Time Spent (min): 15

## 2024-06-03 ENCOUNTER — ANTI-COAG VISIT (OUTPATIENT)
Dept: PHARMACY | Age: 74
End: 2024-06-03
Payer: MEDICARE

## 2024-06-03 DIAGNOSIS — I48.0 PAF (PAROXYSMAL ATRIAL FIBRILLATION) (HCC): Primary | ICD-10-CM

## 2024-06-03 LAB — INTERNATIONAL NORMALIZATION RATIO, POC: 4.1

## 2024-06-03 PROCEDURE — 85610 PROTHROMBIN TIME: CPT

## 2024-06-03 PROCEDURE — 99211 OFF/OP EST MAY X REQ PHY/QHP: CPT

## 2024-06-03 NOTE — PROGRESS NOTES
Mr. Beaulieu is here for management of anticoagulation for AFib.   PMH also significant for HTN, HLD, s/p ablation and DM.    He presents today w/out complaint.  Pt verified dosing regimen.  Pt denies s/s bleeding/bruising/swelling/SOB.  No missed doses.  No changes in OTC/herbal medications.  No changes in diet.  Denies EtOH and tobacco use.  Prescriptions to be filled via [employer].      INR is still same despite dose decreased last visit  Will hold and decrease warfarin dosing further      INR 4.1 is above acceptable therapeutic range of 2-3.  Recommend to hold today dose and decreasing dose to 5 mg daily except 2.5 mg on Wednesday  Pt has warfarin 5 mg tabs.  Will continue to monitor and check INR in 2 weeks.  Dosing reminder card given with phone number, appointment date and time.   Return to clinic:  @ 7 am   Referring physician: KESHIA Bradley CNP  Referral Date: 24    Max Plata, PharmD  6/3/2024 at 7:07 AM    For Pharmacy Admin Tracking Only    Intervention Detail: Adherence Monitorin and Dose Adjustment: 1, reason: Therapy De-escalation  Total # of Interventions Recommended: 2  Total # of Interventions Accepted: 2  Time Spent (min): 15

## 2025-07-09 RX ORDER — FLECAINIDE ACETATE 100 MG/1
TABLET ORAL
Qty: 90 TABLET | Refills: 1 | OUTPATIENT
Start: 2025-07-09

## 2025-07-09 NOTE — TELEPHONE ENCOUNTER
Last Office Visit: 2/12/2024 Provider: [facility]  **Is provider OOT? No    Next Office Visit: NA   Provider:   **If no OV, when does pt need to be seen? Pt due for annual  **Has patient already had 30 day supply? Yes    Lab orders needed? no   Encounter provider correct? Yes If not, change provider  Script changes since last refill? no    LAST LABS:       **Care Everywhere? yes - BMP- 07/07/25  EKG 02/12/24      Front- pt needs annual w/ [facility] for medication refill

## 2025-07-09 NOTE — TELEPHONE ENCOUNTER
LOV [facility] 2/12/2024  As patient has not been seen by [facility]  or had EKG in > 1 year. Will not sent RX at this time until follow up appointment has been made.     Of note, per review of chart patient has been following with cardiology at [facility]. Dr. Polanco/Geetha Fierro. Patient can have them fill if not following with [facility] anymore.

## 2025-07-10 NOTE — TELEPHONE ENCOUNTER
Pt returned call and wasn't sure about what doctors he sees. Pt stated that he is going to call back

## 2025-07-10 NOTE — TELEPHONE ENCOUNTER
7.10.25 first attempt  Called pt at [phone] . [facility] to call [facility] regarding a refill request and to see if pt still would like to follow with [facility]